# Patient Record
Sex: MALE | Race: WHITE | NOT HISPANIC OR LATINO | Employment: OTHER | RURAL
[De-identification: names, ages, dates, MRNs, and addresses within clinical notes are randomized per-mention and may not be internally consistent; named-entity substitution may affect disease eponyms.]

---

## 2020-11-19 ENCOUNTER — HISTORICAL (OUTPATIENT)
Dept: ADMINISTRATIVE | Facility: HOSPITAL | Age: 75
End: 2020-11-19

## 2020-11-19 LAB
ALBUMIN SERPL BCP-MCNC: 3.8 G/DL (ref 3.5–5)
ALBUMIN/GLOB SERPL: 1.3 {RATIO}
ALP SERPL-CCNC: 49 U/L (ref 45–115)
ALT SERPL W P-5'-P-CCNC: 21 U/L (ref 16–61)
ANION GAP SERPL CALCULATED.3IONS-SCNC: 11 MMOL/L (ref 7–16)
AST SERPL W P-5'-P-CCNC: 9 U/L (ref 15–37)
BILIRUB SERPL-MCNC: 0.4 MG/DL (ref 0–1.2)
BUN SERPL-MCNC: 15 MG/DL (ref 7–18)
BUN/CREAT SERPL: 15
CALCIUM SERPL-MCNC: 9.5 MG/DL (ref 8.5–10.1)
CHLORIDE SERPL-SCNC: 99 MMOL/L (ref 98–107)
CO2 SERPL-SCNC: 27 MMOL/L (ref 21–32)
CREAT SERPL-MCNC: 1.03 MG/DL (ref 0.7–1.3)
GLOBULIN SER-MCNC: 2.9 G/DL (ref 2–4)
GLUCOSE SERPL-MCNC: 117 MG/DL (ref 74–106)
POTASSIUM SERPL-SCNC: 4 MMOL/L (ref 3.5–5.1)
PROT SERPL-MCNC: 6.7 G/DL (ref 6.4–8.2)
PSA SERPL-MCNC: 0.99 NG/ML (ref 0–4.4)
SODIUM SERPL-SCNC: 133 MMOL/L (ref 136–145)

## 2021-01-14 ENCOUNTER — HISTORICAL (OUTPATIENT)
Dept: ADMINISTRATIVE | Facility: HOSPITAL | Age: 76
End: 2021-01-14

## 2021-01-14 LAB
ALBUMIN SERPL BCP-MCNC: 4.1 G/DL (ref 3.5–5)
ALBUMIN/GLOB SERPL: 1.3 {RATIO}
ALP SERPL-CCNC: 64 U/L (ref 45–115)
ALT SERPL W P-5'-P-CCNC: 21 U/L (ref 16–61)
ANION GAP SERPL CALCULATED.3IONS-SCNC: 10 MMOL/L (ref 7–16)
AST SERPL W P-5'-P-CCNC: 9 U/L (ref 15–37)
BASOPHILS # BLD AUTO: 0.1 X10E3/UL (ref 0–0.2)
BASOPHILS NFR BLD AUTO: 1 % (ref 0–1)
BILIRUB SERPL-MCNC: 0.5 MG/DL (ref 0–1.2)
BUN SERPL-MCNC: 18 MG/DL (ref 7–18)
BUN/CREAT SERPL: 16
CALCIUM SERPL-MCNC: 9.3 MG/DL (ref 8.5–10.1)
CHLORIDE SERPL-SCNC: 100 MMOL/L (ref 98–107)
CO2 SERPL-SCNC: 26 MMOL/L (ref 21–32)
CREAT SERPL-MCNC: 1.1 MG/DL (ref 0.7–1.3)
EOSINOPHIL # BLD AUTO: 0.2 X10E3/UL (ref 0–0.5)
EOSINOPHIL NFR BLD AUTO: 2.1 % (ref 1–4)
ERYTHROCYTE [DISTWIDTH] IN BLOOD BY AUTOMATED COUNT: 12.3 % (ref 11.5–14.5)
EST. AVERAGE GLUCOSE BLD GHB EST-MCNC: 167 MG/DL
GLOBULIN SER-MCNC: 3.2 G/DL (ref 2–4)
GLUCOSE SERPL-MCNC: 121 MG/DL (ref 74–106)
HBA1C MFR BLD HPLC: 7.6 %
HCT VFR BLD AUTO: 43.9 % (ref 40–54)
HGB BLD-MCNC: 14.8 G/DL (ref 13.5–18)
IMM GRANULOCYTES # BLD AUTO: 0.04 X10E3/UL (ref 0–0.04)
IMM GRANULOCYTES NFR BLD: 0.4 % (ref 0–0.4)
LYMPHOCYTES # BLD AUTO: 1.11 X10E3/UL (ref 1–4.8)
LYMPHOCYTES NFR BLD AUTO: 11.6 % (ref 27–41)
MCH RBC QN AUTO: 29.1 PG (ref 27–31)
MCHC RBC AUTO-ENTMCNC: 33.7 G/DL (ref 32–36)
MCV RBC AUTO: 86.2 FL (ref 80–96)
MONOCYTES # BLD AUTO: 0.69 X10E3/UL (ref 0–0.8)
MONOCYTES NFR BLD AUTO: 7.2 % (ref 2–6)
MPC BLD CALC-MCNC: 9.7 FL (ref 9.4–12.4)
NEUTROPHILS # BLD AUTO: 7.43 X10E3/UL (ref 1.8–7.7)
NEUTROPHILS NFR BLD AUTO: 77.7 % (ref 53–65)
NRBC # BLD AUTO: 0 X10E3/UL (ref 0–0)
NRBC, AUTO (.00): 0 /100 (ref 0–0)
PLATELET # BLD AUTO: 315 X10E3/UL (ref 150–400)
POTASSIUM SERPL-SCNC: 4.4 MMOL/L (ref 3.5–5.1)
PROT SERPL-MCNC: 7.3 G/DL (ref 6.4–8.2)
RBC # BLD AUTO: 5.09 X10E6/UL (ref 4.6–6.2)
SODIUM SERPL-SCNC: 132 MMOL/L (ref 136–145)
TSH SERPL DL<=0.005 MIU/L-ACNC: 1.95 UIU/ML (ref 0.36–3.74)
WBC # BLD AUTO: 9.57 X10E3/UL (ref 4.5–11)

## 2021-06-24 ENCOUNTER — LAB REQUISITION (OUTPATIENT)
Dept: LAB | Facility: HOSPITAL | Age: 76
End: 2021-06-24
Attending: FAMILY MEDICINE
Payer: COMMERCIAL

## 2021-06-24 DIAGNOSIS — D48.9 NEOPLASM OF UNCERTAIN BEHAVIOR, UNSPECIFIED: ICD-10-CM

## 2021-06-24 PROCEDURE — 88304 TISSUE EXAM BY PATHOLOGIST: CPT | Mod: SUR | Performed by: FAMILY MEDICINE

## 2021-06-24 PROCEDURE — 88304 TISSUE EXAM BY PATHOLOGIST: CPT | Mod: 26,,, | Performed by: PATHOLOGY

## 2021-06-24 PROCEDURE — 88304 SURGICAL PATHOLOGY: ICD-10-PCS | Mod: 26,,, | Performed by: PATHOLOGY

## 2021-06-25 LAB
ESTROGEN SERPL-MCNC: NORMAL PG/ML
LAB AP GROSS DESCRIPTION: NORMAL
LAB AP LABORATORY NOTES: NORMAL
T3RU NFR SERPL: NORMAL %

## 2021-09-30 DIAGNOSIS — Z12.11 SCREENING FOR MALIGNANT NEOPLASM OF COLON: Primary | ICD-10-CM

## 2021-10-04 DIAGNOSIS — Z12.11 SCREENING FOR MALIGNANT NEOPLASM OF COLON: Primary | ICD-10-CM

## 2021-10-07 DIAGNOSIS — Z11.59 SCREENING FOR VIRAL DISEASE: Primary | ICD-10-CM

## 2021-10-12 ENCOUNTER — LAB VISIT (OUTPATIENT)
Dept: LAB | Facility: HOSPITAL | Age: 76
End: 2021-10-12
Attending: PHYSICIAN ASSISTANT
Payer: COMMERCIAL

## 2021-10-12 DIAGNOSIS — Z11.59 SCREENING FOR VIRAL DISEASE: ICD-10-CM

## 2021-10-12 LAB — SARS-COV-2 RNA RESP QL NAA+PROBE: NEGATIVE

## 2021-10-12 PROCEDURE — 87635 SARS-COV-2 COVID-19 AMP PRB: CPT

## 2021-10-14 ENCOUNTER — HOSPITAL ENCOUNTER (OUTPATIENT)
Dept: GASTROENTEROLOGY | Facility: HOSPITAL | Age: 76
Discharge: HOME | End: 2021-10-14
Attending: STUDENT IN AN ORGANIZED HEALTH CARE EDUCATION/TRAINING PROGRAM
Payer: COMMERCIAL

## 2021-10-14 ENCOUNTER — ANESTHESIA EVENT (OUTPATIENT)
Dept: GASTROENTEROLOGY | Facility: HOSPITAL | Age: 76
End: 2021-10-14
Payer: COMMERCIAL

## 2021-10-14 ENCOUNTER — ANESTHESIA (OUTPATIENT)
Dept: GASTROENTEROLOGY | Facility: HOSPITAL | Age: 76
End: 2021-10-14
Payer: COMMERCIAL

## 2021-10-14 VITALS
WEIGHT: 222 LBS | SYSTOLIC BLOOD PRESSURE: 94 MMHG | HEIGHT: 73 IN | DIASTOLIC BLOOD PRESSURE: 58 MMHG | OXYGEN SATURATION: 98 % | TEMPERATURE: 97 F | RESPIRATION RATE: 13 BRPM | BODY MASS INDEX: 29.42 KG/M2 | HEART RATE: 66 BPM

## 2021-10-14 DIAGNOSIS — Z12.11 SCREENING FOR MALIGNANT NEOPLASM OF COLON: ICD-10-CM

## 2021-10-14 LAB — GLUCOSE SERPL-MCNC: 197 MG/DL (ref 70–105)

## 2021-10-14 PROCEDURE — 63600175 PHARM REV CODE 636 W HCPCS: Performed by: NURSE ANESTHETIST, CERTIFIED REGISTERED

## 2021-10-14 PROCEDURE — 45385 COLONOSCOPY W/LESION REMOVAL: CPT | Mod: 33,,, | Performed by: STUDENT IN AN ORGANIZED HEALTH CARE EDUCATION/TRAINING PROGRAM

## 2021-10-14 PROCEDURE — 45385 COLONOSCOPY W/LESION REMOVAL: CPT | Mod: 33

## 2021-10-14 PROCEDURE — D9220A PRA ANESTHESIA: Mod: PT,,, | Performed by: NURSE ANESTHETIST, CERTIFIED REGISTERED

## 2021-10-14 PROCEDURE — C1889 IMPLANT/INSERT DEVICE, NOC: HCPCS

## 2021-10-14 PROCEDURE — 25000003 PHARM REV CODE 250: Performed by: STUDENT IN AN ORGANIZED HEALTH CARE EDUCATION/TRAINING PROGRAM

## 2021-10-14 PROCEDURE — 45380 COLONOSCOPY AND BIOPSY: CPT

## 2021-10-14 PROCEDURE — 37000009 HC ANESTHESIA EA ADD 15 MINS

## 2021-10-14 PROCEDURE — 25000003 PHARM REV CODE 250: Performed by: NURSE ANESTHETIST, CERTIFIED REGISTERED

## 2021-10-14 PROCEDURE — 88305 TISSUE EXAM BY PATHOLOGIST: CPT | Mod: 26,,, | Performed by: PATHOLOGY

## 2021-10-14 PROCEDURE — 82962 GLUCOSE BLOOD TEST: CPT

## 2021-10-14 PROCEDURE — D9220A PRA ANESTHESIA: ICD-10-PCS | Mod: PT,,, | Performed by: NURSE ANESTHETIST, CERTIFIED REGISTERED

## 2021-10-14 PROCEDURE — 45385 PR COLONOSCOPY,REMV LESN,SNARE: ICD-10-PCS | Mod: 33,,, | Performed by: STUDENT IN AN ORGANIZED HEALTH CARE EDUCATION/TRAINING PROGRAM

## 2021-10-14 PROCEDURE — 45380 COLONOSCOPY AND BIOPSY: CPT | Mod: 59,33,, | Performed by: STUDENT IN AN ORGANIZED HEALTH CARE EDUCATION/TRAINING PROGRAM

## 2021-10-14 PROCEDURE — 27201423 OPTIME MED/SURG SUP & DEVICES STERILE SUPPLY

## 2021-10-14 PROCEDURE — 27000284 HC CANNULA NASAL: Performed by: NURSE ANESTHETIST, CERTIFIED REGISTERED

## 2021-10-14 PROCEDURE — 88305 TISSUE EXAM BY PATHOLOGIST: CPT | Mod: SUR | Performed by: STUDENT IN AN ORGANIZED HEALTH CARE EDUCATION/TRAINING PROGRAM

## 2021-10-14 PROCEDURE — 37000008 HC ANESTHESIA 1ST 15 MINUTES

## 2021-10-14 PROCEDURE — 45380 PR COLONOSCOPY,BIOPSY: ICD-10-PCS | Mod: 59,33,, | Performed by: STUDENT IN AN ORGANIZED HEALTH CARE EDUCATION/TRAINING PROGRAM

## 2021-10-14 PROCEDURE — 88305 SURGICAL PATHOLOGY: ICD-10-PCS | Mod: 26,,, | Performed by: PATHOLOGY

## 2021-10-14 RX ORDER — LIDOCAINE HYDROCHLORIDE 20 MG/ML
INJECTION, SOLUTION EPIDURAL; INFILTRATION; INTRACAUDAL; PERINEURAL
Status: DISCONTINUED | OUTPATIENT
Start: 2021-10-14 | End: 2021-10-14

## 2021-10-14 RX ORDER — METFORMIN HYDROCHLORIDE 1000 MG/1
1000 TABLET ORAL 2 TIMES DAILY WITH MEALS
COMMUNITY
End: 2022-10-11 | Stop reason: SDUPTHER

## 2021-10-14 RX ORDER — DOXYCYCLINE 100 MG/1
100 CAPSULE ORAL EVERY 12 HOURS
COMMUNITY

## 2021-10-14 RX ORDER — PRAVASTATIN SODIUM 40 MG/1
40 TABLET ORAL DAILY
COMMUNITY

## 2021-10-14 RX ORDER — PROPOFOL 10 MG/ML
INJECTION, EMULSION INTRAVENOUS
Status: DISCONTINUED | OUTPATIENT
Start: 2021-10-14 | End: 2021-10-14

## 2021-10-14 RX ORDER — DORZOLAMIDE HCL 20 MG/ML
1 SOLUTION/ DROPS OPHTHALMIC 3 TIMES DAILY
COMMUNITY

## 2021-10-14 RX ORDER — LOSARTAN POTASSIUM AND HYDROCHLOROTHIAZIDE 25; 100 MG/1; MG/1
1 TABLET ORAL DAILY
COMMUNITY
End: 2022-10-11 | Stop reason: SDUPTHER

## 2021-10-14 RX ORDER — CARVEDILOL 3.12 MG/1
3.12 TABLET ORAL 2 TIMES DAILY WITH MEALS
COMMUNITY

## 2021-10-14 RX ORDER — BIMATOPROST 0.3 MG/ML
1 SOLUTION/ DROPS OPHTHALMIC NIGHTLY
COMMUNITY

## 2021-10-14 RX ORDER — PHENYLEPHRINE HYDROCHLORIDE 10 MG/ML
INJECTION INTRAVENOUS
Status: DISCONTINUED | OUTPATIENT
Start: 2021-10-14 | End: 2021-10-14

## 2021-10-14 RX ORDER — SODIUM CHLORIDE 0.9 % (FLUSH) 0.9 %
10 SYRINGE (ML) INJECTION
Status: DISCONTINUED | OUTPATIENT
Start: 2021-10-14 | End: 2021-10-15 | Stop reason: HOSPADM

## 2021-10-14 RX ORDER — SODIUM CHLORIDE 9 MG/ML
INJECTION, SOLUTION INTRAVENOUS CONTINUOUS
Status: DISCONTINUED | OUTPATIENT
Start: 2021-10-14 | End: 2021-10-15 | Stop reason: HOSPADM

## 2021-10-14 RX ORDER — NAPROXEN SODIUM 220 MG/1
81 TABLET, FILM COATED ORAL
COMMUNITY

## 2021-10-14 RX ORDER — AMLODIPINE BESYLATE 10 MG/1
10 TABLET ORAL DAILY
COMMUNITY
End: 2022-10-11 | Stop reason: SDUPTHER

## 2021-10-14 RX ORDER — LIRAGLUTIDE 6 MG/ML
1.8 INJECTION SUBCUTANEOUS DAILY
COMMUNITY
End: 2022-10-11 | Stop reason: SDUPTHER

## 2021-10-14 RX ORDER — INSULIN DETEMIR 100 [IU]/ML
50 INJECTION, SOLUTION SUBCUTANEOUS NIGHTLY
COMMUNITY
End: 2022-10-11 | Stop reason: SDUPTHER

## 2021-10-14 RX ADMIN — PROPOFOL 50 MG: 10 INJECTION, EMULSION INTRAVENOUS at 08:10

## 2021-10-14 RX ADMIN — PROPOFOL 100 MG: 10 INJECTION, EMULSION INTRAVENOUS at 08:10

## 2021-10-14 RX ADMIN — PHENYLEPHRINE HYDROCHLORIDE 100 MCG: 10 INJECTION INTRAVENOUS at 08:10

## 2021-10-14 RX ADMIN — SODIUM CHLORIDE: 9 INJECTION, SOLUTION INTRAVENOUS at 07:10

## 2021-10-14 RX ADMIN — LIDOCAINE HYDROCHLORIDE 100 MG: 20 INJECTION, SOLUTION INTRAVENOUS at 08:10

## 2022-10-11 ENCOUNTER — OFFICE VISIT (OUTPATIENT)
Dept: FAMILY MEDICINE | Facility: CLINIC | Age: 77
End: 2022-10-11
Payer: COMMERCIAL

## 2022-10-11 VITALS
WEIGHT: 228.5 LBS | HEART RATE: 76 BPM | OXYGEN SATURATION: 98 % | TEMPERATURE: 98 F | SYSTOLIC BLOOD PRESSURE: 132 MMHG | BODY MASS INDEX: 30.28 KG/M2 | HEIGHT: 73 IN | DIASTOLIC BLOOD PRESSURE: 76 MMHG

## 2022-10-11 DIAGNOSIS — Z79.4 TYPE 2 DIABETES MELLITUS WITHOUT COMPLICATION, WITH LONG-TERM CURRENT USE OF INSULIN: Primary | ICD-10-CM

## 2022-10-11 DIAGNOSIS — E11.9 TYPE 2 DIABETES MELLITUS WITHOUT COMPLICATION, WITH LONG-TERM CURRENT USE OF INSULIN: Primary | ICD-10-CM

## 2022-10-11 DIAGNOSIS — I10 HYPERTENSION, UNSPECIFIED TYPE: ICD-10-CM

## 2022-10-11 DIAGNOSIS — R73.9 HEMOGLOBIN A1C BETWEEN 7.0% AND 9.0%: ICD-10-CM

## 2022-10-11 DIAGNOSIS — E78.5 HYPERLIPIDEMIA, UNSPECIFIED HYPERLIPIDEMIA TYPE: ICD-10-CM

## 2022-10-11 DIAGNOSIS — Z12.5 SCREENING FOR MALIGNANT NEOPLASM OF PROSTATE: ICD-10-CM

## 2022-10-11 LAB
ALBUMIN SERPL BCP-MCNC: 4.3 G/DL (ref 3.5–5)
ALBUMIN/GLOB SERPL: 1.4 {RATIO}
ALP SERPL-CCNC: 63 U/L (ref 45–115)
ALT SERPL W P-5'-P-CCNC: 22 U/L (ref 16–61)
ANION GAP SERPL CALCULATED.3IONS-SCNC: 12 MMOL/L (ref 7–16)
AST SERPL W P-5'-P-CCNC: 7 U/L (ref 15–37)
BASOPHILS # BLD AUTO: 0.09 K/UL (ref 0–0.2)
BASOPHILS NFR BLD AUTO: 0.9 % (ref 0–1)
BILIRUB SERPL-MCNC: 0.6 MG/DL (ref ?–1.2)
BUN SERPL-MCNC: 12 MG/DL (ref 7–18)
BUN/CREAT SERPL: 11 (ref 6–20)
CALCIUM SERPL-MCNC: 9.6 MG/DL (ref 8.5–10.1)
CHLORIDE SERPL-SCNC: 97 MMOL/L (ref 98–107)
CO2 SERPL-SCNC: 26 MMOL/L (ref 21–32)
CREAT SERPL-MCNC: 1.13 MG/DL (ref 0.7–1.3)
DIFFERENTIAL METHOD BLD: ABNORMAL
EGFR (NO RACE VARIABLE) (RUSH/TITUS): 67 ML/MIN/1.73M²
EOSINOPHIL # BLD AUTO: 0.2 K/UL (ref 0–0.5)
EOSINOPHIL NFR BLD AUTO: 2 % (ref 1–4)
ERYTHROCYTE [DISTWIDTH] IN BLOOD BY AUTOMATED COUNT: 12.3 % (ref 11.5–14.5)
GLOBULIN SER-MCNC: 3.1 G/DL (ref 2–4)
GLUCOSE SERPL-MCNC: 151 MG/DL (ref 74–106)
HCT VFR BLD AUTO: 47 % (ref 40–54)
HGB BLD-MCNC: 16.1 G/DL (ref 13.5–18)
IMM GRANULOCYTES # BLD AUTO: 0.05 K/UL (ref 0–0.04)
IMM GRANULOCYTES NFR BLD: 0.5 % (ref 0–0.4)
LYMPHOCYTES # BLD AUTO: 1.69 K/UL (ref 1–4.8)
LYMPHOCYTES NFR BLD AUTO: 16.7 % (ref 27–41)
MCH RBC QN AUTO: 29.1 PG (ref 27–31)
MCHC RBC AUTO-ENTMCNC: 34.3 G/DL (ref 32–36)
MCV RBC AUTO: 85 FL (ref 80–96)
MONOCYTES # BLD AUTO: 0.7 K/UL (ref 0–0.8)
MONOCYTES NFR BLD AUTO: 6.9 % (ref 2–6)
MPC BLD CALC-MCNC: 9.5 FL (ref 9.4–12.4)
NEUTROPHILS # BLD AUTO: 7.38 K/UL (ref 1.8–7.7)
NEUTROPHILS NFR BLD AUTO: 73 % (ref 53–65)
NRBC # BLD AUTO: 0 X10E3/UL
NRBC, AUTO (.00): 0 %
PLATELET # BLD AUTO: 358 K/UL (ref 150–400)
POTASSIUM SERPL-SCNC: 5.3 MMOL/L (ref 3.5–5.1)
PROT SERPL-MCNC: 7.4 G/DL (ref 6.4–8.2)
PSA SERPL-MCNC: 1.3 NG/ML
RBC # BLD AUTO: 5.53 M/UL (ref 4.6–6.2)
SODIUM SERPL-SCNC: 130 MMOL/L (ref 136–145)
WBC # BLD AUTO: 10.11 K/UL (ref 4.5–11)

## 2022-10-11 PROCEDURE — 82570 ASSAY OF URINE CREATININE: CPT | Mod: ,,, | Performed by: CLINICAL MEDICAL LABORATORY

## 2022-10-11 PROCEDURE — 99204 PR OFFICE/OUTPT VISIT, NEW, LEVL IV, 45-59 MIN: ICD-10-PCS | Mod: ,,, | Performed by: FAMILY MEDICINE

## 2022-10-11 PROCEDURE — 3075F PR MOST RECENT SYSTOLIC BLOOD PRESS GE 130-139MM HG: ICD-10-PCS | Mod: CPTII,,, | Performed by: FAMILY MEDICINE

## 2022-10-11 PROCEDURE — 3075F SYST BP GE 130 - 139MM HG: CPT | Mod: CPTII,,, | Performed by: FAMILY MEDICINE

## 2022-10-11 PROCEDURE — 3078F PR MOST RECENT DIASTOLIC BLOOD PRESSURE < 80 MM HG: ICD-10-PCS | Mod: CPTII,,, | Performed by: FAMILY MEDICINE

## 2022-10-11 PROCEDURE — 83036 HEMOGLOBIN A1C: ICD-10-PCS | Mod: ,,, | Performed by: CLINICAL MEDICAL LABORATORY

## 2022-10-11 PROCEDURE — G0103 PSA, SCREENING: ICD-10-PCS | Mod: ,,, | Performed by: CLINICAL MEDICAL LABORATORY

## 2022-10-11 PROCEDURE — 82570 MICROALBUMIN / CREATININE RATIO URINE: ICD-10-PCS | Mod: ,,, | Performed by: CLINICAL MEDICAL LABORATORY

## 2022-10-11 PROCEDURE — 3288F PR FALLS RISK ASSESSMENT DOCUMENTED: ICD-10-PCS | Mod: CPTII,,, | Performed by: FAMILY MEDICINE

## 2022-10-11 PROCEDURE — 1159F MED LIST DOCD IN RCRD: CPT | Mod: CPTII,,, | Performed by: FAMILY MEDICINE

## 2022-10-11 PROCEDURE — 80053 COMPREHEN METABOLIC PANEL: CPT | Mod: ,,, | Performed by: CLINICAL MEDICAL LABORATORY

## 2022-10-11 PROCEDURE — 99204 OFFICE O/P NEW MOD 45 MIN: CPT | Mod: ,,, | Performed by: FAMILY MEDICINE

## 2022-10-11 PROCEDURE — 82043 MICROALBUMIN / CREATININE RATIO URINE: ICD-10-PCS | Mod: ,,, | Performed by: CLINICAL MEDICAL LABORATORY

## 2022-10-11 PROCEDURE — 1101F PT FALLS ASSESS-DOCD LE1/YR: CPT | Mod: CPTII,,, | Performed by: FAMILY MEDICINE

## 2022-10-11 PROCEDURE — G0103 PSA SCREENING: HCPCS | Mod: ,,, | Performed by: CLINICAL MEDICAL LABORATORY

## 2022-10-11 PROCEDURE — 3078F DIAST BP <80 MM HG: CPT | Mod: CPTII,,, | Performed by: FAMILY MEDICINE

## 2022-10-11 PROCEDURE — 85025 CBC WITH DIFFERENTIAL: ICD-10-PCS | Mod: ,,, | Performed by: CLINICAL MEDICAL LABORATORY

## 2022-10-11 PROCEDURE — 1159F PR MEDICATION LIST DOCUMENTED IN MEDICAL RECORD: ICD-10-PCS | Mod: CPTII,,, | Performed by: FAMILY MEDICINE

## 2022-10-11 PROCEDURE — 1101F PR PT FALLS ASSESS DOC 0-1 FALLS W/OUT INJ PAST YR: ICD-10-PCS | Mod: CPTII,,, | Performed by: FAMILY MEDICINE

## 2022-10-11 PROCEDURE — 85025 COMPLETE CBC W/AUTO DIFF WBC: CPT | Mod: ,,, | Performed by: CLINICAL MEDICAL LABORATORY

## 2022-10-11 PROCEDURE — 3288F FALL RISK ASSESSMENT DOCD: CPT | Mod: CPTII,,, | Performed by: FAMILY MEDICINE

## 2022-10-11 PROCEDURE — 80053 COMPREHENSIVE METABOLIC PANEL: ICD-10-PCS | Mod: ,,, | Performed by: CLINICAL MEDICAL LABORATORY

## 2022-10-11 PROCEDURE — 82043 UR ALBUMIN QUANTITATIVE: CPT | Mod: ,,, | Performed by: CLINICAL MEDICAL LABORATORY

## 2022-10-11 PROCEDURE — 83036 HEMOGLOBIN GLYCOSYLATED A1C: CPT | Mod: ,,, | Performed by: CLINICAL MEDICAL LABORATORY

## 2022-10-11 RX ORDER — INSULIN DETEMIR 100 [IU]/ML
50 INJECTION, SOLUTION SUBCUTANEOUS NIGHTLY
Qty: 45 ML | Refills: 0 | Status: SHIPPED | OUTPATIENT
Start: 2022-10-11 | End: 2023-01-12 | Stop reason: SDUPTHER

## 2022-10-11 RX ORDER — METFORMIN HYDROCHLORIDE 1000 MG/1
1000 TABLET ORAL 2 TIMES DAILY WITH MEALS
Qty: 180 TABLET | Refills: 0 | Status: SHIPPED | OUTPATIENT
Start: 2022-10-11 | End: 2023-01-12 | Stop reason: SDUPTHER

## 2022-10-11 RX ORDER — PEN NEEDLE, DIABETIC 29 G X1/2"
2 NEEDLE, DISPOSABLE MISCELLANEOUS 2 TIMES DAILY
Qty: 100 EACH | Refills: 3 | Status: SHIPPED | OUTPATIENT
Start: 2022-10-11 | End: 2023-04-17 | Stop reason: SDUPTHER

## 2022-10-11 RX ORDER — LIRAGLUTIDE 6 MG/ML
1.8 INJECTION SUBCUTANEOUS DAILY
Qty: 27 ML | Refills: 0 | Status: SHIPPED | OUTPATIENT
Start: 2022-10-11 | End: 2023-01-12 | Stop reason: SDUPTHER

## 2022-10-11 RX ORDER — PEN NEEDLE, DIABETIC 29 G X1/2"
2 NEEDLE, DISPOSABLE MISCELLANEOUS 2 TIMES DAILY
COMMUNITY
End: 2022-10-11 | Stop reason: SDUPTHER

## 2022-10-11 RX ORDER — AMLODIPINE BESYLATE 10 MG/1
10 TABLET ORAL DAILY
Qty: 90 TABLET | Refills: 1 | Status: SHIPPED | OUTPATIENT
Start: 2022-10-11 | End: 2023-01-12 | Stop reason: SDUPTHER

## 2022-10-11 RX ORDER — LOSARTAN POTASSIUM AND HYDROCHLOROTHIAZIDE 25; 100 MG/1; MG/1
1 TABLET ORAL DAILY
Qty: 90 TABLET | Refills: 1 | Status: SHIPPED | OUTPATIENT
Start: 2022-10-11 | End: 2023-01-12 | Stop reason: SDUPTHER

## 2022-10-11 NOTE — PROGRESS NOTES
Dontrell Shetty MD   [facility]  [address] [place] [address]     PATIENT NAME: Milton Johnson  : 1945  DATE: 10/11/22  MRN: [id number]      Billing Provider: Dontrell Shetty MD  Level of Service:   Patient PCP Information       Provider PCP Higinio Jarrett MD General            Reason for Visit / Chief Complaint: Diabetes (Establish care. Fasting labs. Med refills.), Hypertension, and Hyperlipidemia         History of Present Illness / Problem Focused Workflow     Milton Johnson presents to the clinic with Diabetes (Establish care. Fasting labs. Med refills.), Hypertension, and Hyperlipidemia     HPI    Review of Systems     Review of Systems   Constitutional:  Negative for activity change, appetite change, fatigue and fever.   HENT:  Negative for nasal congestion, ear pain, hearing loss, sinus pressure/congestion and sore throat.    Respiratory:  Negative for cough, chest tightness and shortness of breath.    Cardiovascular:  Negative for chest pain and palpitations.   Gastrointestinal:  Negative for abdominal pain and fecal incontinence.   Genitourinary:  Negative for bladder incontinence, difficulty urinating and erectile dysfunction.   Musculoskeletal:  Negative for arthralgias.   Integumentary:  Negative for rash.   Neurological:  Negative for dizziness and headaches.      Medical / Social / Family History     Past Medical History:   Diagnosis Date    Diabetes mellitus     Glaucoma     High cholesterol     Hypertension        Past Surgical History:   Procedure Laterality Date    CATARACT EXTRACTION      EYE SURGERY         Social History  Milton Johnson  reports that he has never smoked. He has never used smokeless tobacco. He reports that he does not drink alcohol.    Family History  Milton Johnson  family history is not on file.    Medications and Allergies     Medications  Outpatient Medications Marked as Taking for the 10/11/22 encounter (Office  "Visit) with Dontrell Shetty MD   Medication Sig Dispense Refill    amLODIPine (NORVASC) 10 MG tablet Take 10 mg by mouth once daily.      aspirin 81 MG Chew Take 81 mg by mouth. 2 times per week      bimatoprost (LUMIGAN) 0.03 % ophthalmic drops 1 drop every evening.      carvediloL (COREG) 3.125 MG tablet Take 3.125 mg by mouth 2 (two) times daily with meals.      dorzolamide (TRUSOPT) 2 % ophthalmic solution 1 drop 3 (three) times daily.      insulin detemir U-100 (LEVEMIR FLEXTOUCH U-100 INSULN) 100 unit/mL (3 mL) InPn pen Inject 50 Units into the skin every evening.      liraglutide 0.6 mg/0.1 mL, 18 mg/3 mL, subq PNIJ (VICTOZA 3-LORI) 0.6 mg/0.1 mL (18 mg/3 mL) PnIj pen Inject 1.8 mg into the skin once daily.      losartan-hydrochlorothiazide 100-25 mg (HYZAAR) 100-25 mg per tablet Take 1 tablet by mouth once daily.      metFORMIN (GLUCOPHAGE) 1000 MG tablet Take 1,000 mg by mouth 2 (two) times daily with meals.      pen needle, diabetic 31 gauge x 1/4" Ndle Inject 2 each into the skin 2 (two) times a day.      pravastatin (PRAVACHOL) 40 MG tablet Take 40 mg by mouth once daily.         Allergies  Review of patient's allergies indicates:  No Known Allergies    Physical Examination     Vitals:    10/11/22 0918   BP: 132/76   Pulse: 76   Temp: 97.8 °F (36.6 °C)     Physical Exam  Constitutional:       General: He is not in acute distress.     Appearance: He is not ill-appearing.   HENT:      Head: Normocephalic and atraumatic.      Right Ear: Tympanic membrane and ear canal normal.      Left Ear: Tympanic membrane and ear canal normal.      Nose: Nose normal. No congestion or rhinorrhea.   Eyes:      Pupils: Pupils are equal, round, and reactive to light.   Cardiovascular:      Rate and Rhythm: Normal rate and regular rhythm.      Pulses: Normal pulses.      Heart sounds: No murmur heard.  Pulmonary:      Effort: No respiratory distress.      Breath sounds: No wheezing, rhonchi or rales.   Abdominal:      " General: Bowel sounds are normal.      Palpations: Abdomen is soft.      Tenderness: There is no abdominal tenderness.      Hernia: No hernia is present.   Musculoskeletal:      Cervical back: Normal range of motion and neck supple.   Lymphadenopathy:      Cervical: No cervical adenopathy.   Skin:     General: Skin is warm and dry.   Neurological:      Mental Status: He is alert.   Psychiatric:         Behavior: Behavior normal.         Thought Content: Thought content normal.        Assessment and Plan (including Health Maintenance)   :    Plan:         Health Maintenance Due   Topic Date Due    Hepatitis C Screening  Never done    Diabetes Urine Screening  Never done    Pneumococcal Vaccines (Age 65+) (1 - PCV) Never done    TETANUS VACCINE  Never done    Shingles Vaccine (1 of 2) Never done    COVID-19 Vaccine (4 - Booster for Moderna series) 12/23/2021    Hemoglobin A1c  10/07/2022       Problem List Items Addressed This Visit    None  Visit Diagnoses       Type 2 diabetes mellitus without complication, with long-term current use of insulin    -  Primary    Relevant Orders    CBC Auto Differential    Comprehensive Metabolic Panel    Hemoglobin A1C    Microalbumin/Creatinine Ratio, Urine    Hypertension, unspecified type        Relevant Orders    CBC Auto Differential    Comprehensive Metabolic Panel    Hyperlipidemia, unspecified hyperlipidemia type        Relevant Orders    CBC Auto Differential    Comprehensive Metabolic Panel    Hemoglobin A1c between 7.0% and 9.0%        Relevant Orders    Hemoglobin A1C    Microalbumin/Creatinine Ratio, Urine    Screening for malignant neoplasm of prostate        Relevant Orders    PSA, Screening          Type 2 diabetes mellitus without complication, with long-term current use of insulin  -     CBC Auto Differential; Future; Expected date: 10/11/2022  -     Comprehensive Metabolic Panel; Future; Expected date: 10/11/2022  -     Hemoglobin A1C; Future; Expected date:  10/11/2022  -     Microalbumin/Creatinine Ratio, Urine; Future; Expected date: 10/11/2022    Hypertension, unspecified type  -     CBC Auto Differential; Future; Expected date: 10/11/2022  -     Comprehensive Metabolic Panel; Future; Expected date: 10/11/2022    Hyperlipidemia, unspecified hyperlipidemia type  -     CBC Auto Differential; Future; Expected date: 10/11/2022  -     Comprehensive Metabolic Panel; Future; Expected date: 10/11/2022    Hemoglobin A1c between 7.0% and 9.0%  -     Hemoglobin A1C; Future; Expected date: 10/11/2022  -     Microalbumin/Creatinine Ratio, Urine; Future; Expected date: 10/11/2022    Screening for malignant neoplasm of prostate  -     PSA, Screening; Future; Expected date: 10/11/2022       Health Maintenance Topics with due status: Not Due       Topic Last Completion Date    Lipid Panel 07/07/2022    Eye Exam 09/21/2022       Procedures     Future Appointments   Date Time Provider Department Center   10/25/2022  9:30 AM SANDRA Stubbs [facility] ZAYNAB Durant        No follow-ups on file.       Signature:  Dontrell Shetty MD  [facility]  [address] [facility]   [place] [address]  [phone] Phone  [phone] Fax    Date of encounter: 10/11/22

## 2022-10-12 LAB
CREAT UR-MCNC: 111 MG/DL (ref 39–259)
EST. AVERAGE GLUCOSE BLD GHB EST-MCNC: 210 MG/DL
HBA1C MFR BLD HPLC: 8.9 % (ref 4.5–6.6)
MICROALBUMIN UR-MCNC: 9.1 MG/DL (ref 0–2.8)
MICROALBUMIN/CREAT RATIO PNL UR: 82 MG/G (ref 0–30)

## 2023-01-12 ENCOUNTER — OFFICE VISIT (OUTPATIENT)
Dept: FAMILY MEDICINE | Facility: CLINIC | Age: 78
End: 2023-01-12
Payer: MEDICARE

## 2023-01-12 VITALS
HEIGHT: 73 IN | HEART RATE: 75 BPM | OXYGEN SATURATION: 97 % | BODY MASS INDEX: 29.37 KG/M2 | SYSTOLIC BLOOD PRESSURE: 140 MMHG | TEMPERATURE: 98 F | DIASTOLIC BLOOD PRESSURE: 72 MMHG | WEIGHT: 221.63 LBS

## 2023-01-12 DIAGNOSIS — E78.5 HYPERLIPIDEMIA, UNSPECIFIED HYPERLIPIDEMIA TYPE: ICD-10-CM

## 2023-01-12 DIAGNOSIS — Z79.4 TYPE 2 DIABETES MELLITUS WITHOUT COMPLICATION, WITH LONG-TERM CURRENT USE OF INSULIN: Primary | ICD-10-CM

## 2023-01-12 DIAGNOSIS — I10 HYPERTENSION, UNSPECIFIED TYPE: ICD-10-CM

## 2023-01-12 DIAGNOSIS — E11.9 TYPE 2 DIABETES MELLITUS WITHOUT COMPLICATION, WITH LONG-TERM CURRENT USE OF INSULIN: Primary | ICD-10-CM

## 2023-01-12 DIAGNOSIS — H61.23 BILATERAL IMPACTED CERUMEN: ICD-10-CM

## 2023-01-12 DIAGNOSIS — R73.9 HEMOGLOBIN A1C BETWEEN 7.0% AND 9.0%: ICD-10-CM

## 2023-01-12 LAB
ANION GAP SERPL CALCULATED.3IONS-SCNC: 15 MMOL/L (ref 7–16)
BASOPHILS # BLD AUTO: 0.12 K/UL (ref 0–0.2)
BASOPHILS NFR BLD AUTO: 1.2 % (ref 0–1)
BUN SERPL-MCNC: 17 MG/DL (ref 7–18)
BUN/CREAT SERPL: 16 (ref 6–20)
CALCIUM SERPL-MCNC: 9.5 MG/DL (ref 8.5–10.1)
CHLORIDE SERPL-SCNC: 94 MMOL/L (ref 98–107)
CHOLEST SERPL-MCNC: 131 MG/DL (ref 0–200)
CHOLEST/HDLC SERPL: 2.8 {RATIO}
CO2 SERPL-SCNC: 26 MMOL/L (ref 21–32)
CREAT SERPL-MCNC: 1.05 MG/DL (ref 0.7–1.3)
DIFFERENTIAL METHOD BLD: ABNORMAL
EGFR (NO RACE VARIABLE) (RUSH/TITUS): 74 ML/MIN/1.73M²
EOSINOPHIL # BLD AUTO: 0.15 K/UL (ref 0–0.5)
EOSINOPHIL NFR BLD AUTO: 1.6 % (ref 1–4)
ERYTHROCYTE [DISTWIDTH] IN BLOOD BY AUTOMATED COUNT: 12.3 % (ref 11.5–14.5)
EST. AVERAGE GLUCOSE BLD GHB EST-MCNC: 224 MG/DL
GLUCOSE SERPL-MCNC: 131 MG/DL (ref 74–106)
HBA1C MFR BLD HPLC: 9.3 % (ref 4.5–6.6)
HCT VFR BLD AUTO: 44.8 % (ref 40–54)
HDLC SERPL-MCNC: 46 MG/DL (ref 40–60)
HGB BLD-MCNC: 15.2 G/DL (ref 13.5–18)
IMM GRANULOCYTES # BLD AUTO: 0.05 K/UL (ref 0–0.04)
IMM GRANULOCYTES NFR BLD: 0.5 % (ref 0–0.4)
LDLC SERPL CALC-MCNC: 65 MG/DL
LDLC/HDLC SERPL: 1.4 {RATIO}
LYMPHOCYTES # BLD AUTO: 1.63 K/UL (ref 1–4.8)
LYMPHOCYTES NFR BLD AUTO: 16.9 % (ref 27–41)
MCH RBC QN AUTO: 28.6 PG (ref 27–31)
MCHC RBC AUTO-ENTMCNC: 33.9 G/DL (ref 32–36)
MCV RBC AUTO: 84.2 FL (ref 80–96)
MONOCYTES # BLD AUTO: 0.67 K/UL (ref 0–0.8)
MONOCYTES NFR BLD AUTO: 6.9 % (ref 2–6)
MPC BLD CALC-MCNC: 8.7 FL (ref 9.4–12.4)
NEUTROPHILS # BLD AUTO: 7.03 K/UL (ref 1.8–7.7)
NEUTROPHILS NFR BLD AUTO: 72.9 % (ref 53–65)
NONHDLC SERPL-MCNC: 85 MG/DL
NRBC # BLD AUTO: 0 X10E3/UL
NRBC, AUTO (.00): 0 %
PLATELET # BLD AUTO: 393 K/UL (ref 150–400)
POTASSIUM SERPL-SCNC: 4.5 MMOL/L (ref 3.5–5.1)
RBC # BLD AUTO: 5.32 M/UL (ref 4.6–6.2)
SODIUM SERPL-SCNC: 130 MMOL/L (ref 136–145)
TRIGL SERPL-MCNC: 98 MG/DL (ref 35–150)
VLDLC SERPL-MCNC: 20 MG/DL
WBC # BLD AUTO: 9.65 K/UL (ref 4.5–11)

## 2023-01-12 PROCEDURE — 85025 CBC WITH DIFFERENTIAL: ICD-10-PCS | Mod: ,,, | Performed by: CLINICAL MEDICAL LABORATORY

## 2023-01-12 PROCEDURE — 1100F PR PT FALLS ASSESS DOC 2+ FALLS/FALL W/INJURY/YR: ICD-10-PCS | Mod: ,,, | Performed by: FAMILY MEDICINE

## 2023-01-12 PROCEDURE — 1159F MED LIST DOCD IN RCRD: CPT | Mod: ,,, | Performed by: FAMILY MEDICINE

## 2023-01-12 PROCEDURE — 99214 PR OFFICE/OUTPT VISIT, EST, LEVL IV, 30-39 MIN: ICD-10-PCS | Mod: 25,,, | Performed by: FAMILY MEDICINE

## 2023-01-12 PROCEDURE — 3078F DIAST BP <80 MM HG: CPT | Mod: ,,, | Performed by: FAMILY MEDICINE

## 2023-01-12 PROCEDURE — 83036 HEMOGLOBIN A1C: ICD-10-PCS | Mod: ,,, | Performed by: CLINICAL MEDICAL LABORATORY

## 2023-01-12 PROCEDURE — 80048 BASIC METABOLIC PANEL: ICD-10-PCS | Mod: ,,, | Performed by: CLINICAL MEDICAL LABORATORY

## 2023-01-12 PROCEDURE — 3288F PR FALLS RISK ASSESSMENT DOCUMENTED: ICD-10-PCS | Mod: ,,, | Performed by: FAMILY MEDICINE

## 2023-01-12 PROCEDURE — 1159F PR MEDICATION LIST DOCUMENTED IN MEDICAL RECORD: ICD-10-PCS | Mod: ,,, | Performed by: FAMILY MEDICINE

## 2023-01-12 PROCEDURE — 85025 COMPLETE CBC W/AUTO DIFF WBC: CPT | Mod: ,,, | Performed by: CLINICAL MEDICAL LABORATORY

## 2023-01-12 PROCEDURE — 80061 LIPID PANEL: CPT | Mod: ,,, | Performed by: CLINICAL MEDICAL LABORATORY

## 2023-01-12 PROCEDURE — 83036 HEMOGLOBIN GLYCOSYLATED A1C: CPT | Mod: ,,, | Performed by: CLINICAL MEDICAL LABORATORY

## 2023-01-12 PROCEDURE — 69210 EAR CERUMEN REMOVAL: ICD-10-PCS | Mod: ,,, | Performed by: FAMILY MEDICINE

## 2023-01-12 PROCEDURE — 69210 REMOVE IMPACTED EAR WAX UNI: CPT | Mod: ,,, | Performed by: FAMILY MEDICINE

## 2023-01-12 PROCEDURE — 99214 OFFICE O/P EST MOD 30 MIN: CPT | Mod: 25,,, | Performed by: FAMILY MEDICINE

## 2023-01-12 PROCEDURE — 3077F PR MOST RECENT SYSTOLIC BLOOD PRESSURE >= 140 MM HG: ICD-10-PCS | Mod: ,,, | Performed by: FAMILY MEDICINE

## 2023-01-12 PROCEDURE — 3288F FALL RISK ASSESSMENT DOCD: CPT | Mod: ,,, | Performed by: FAMILY MEDICINE

## 2023-01-12 PROCEDURE — 80061 LIPID PANEL: ICD-10-PCS | Mod: ,,, | Performed by: CLINICAL MEDICAL LABORATORY

## 2023-01-12 PROCEDURE — 3077F SYST BP >= 140 MM HG: CPT | Mod: ,,, | Performed by: FAMILY MEDICINE

## 2023-01-12 PROCEDURE — 1100F PTFALLS ASSESS-DOCD GE2>/YR: CPT | Mod: ,,, | Performed by: FAMILY MEDICINE

## 2023-01-12 PROCEDURE — 80048 BASIC METABOLIC PNL TOTAL CA: CPT | Mod: ,,, | Performed by: CLINICAL MEDICAL LABORATORY

## 2023-01-12 PROCEDURE — 3078F PR MOST RECENT DIASTOLIC BLOOD PRESSURE < 80 MM HG: ICD-10-PCS | Mod: ,,, | Performed by: FAMILY MEDICINE

## 2023-01-12 RX ORDER — LOSARTAN POTASSIUM AND HYDROCHLOROTHIAZIDE 25; 100 MG/1; MG/1
1 TABLET ORAL DAILY
Qty: 90 TABLET | Refills: 1 | Status: SHIPPED | OUTPATIENT
Start: 2023-01-12 | End: 2023-07-18 | Stop reason: SDUPTHER

## 2023-01-12 RX ORDER — LIRAGLUTIDE 6 MG/ML
1.8 INJECTION SUBCUTANEOUS DAILY
Qty: 27 ML | Refills: 0 | Status: SHIPPED | OUTPATIENT
Start: 2023-01-12 | End: 2023-04-17 | Stop reason: SDUPTHER

## 2023-01-12 RX ORDER — METFORMIN HYDROCHLORIDE 1000 MG/1
1000 TABLET ORAL 2 TIMES DAILY WITH MEALS
Qty: 180 TABLET | Refills: 0 | Status: SHIPPED | OUTPATIENT
Start: 2023-01-12 | End: 2023-04-17 | Stop reason: SDUPTHER

## 2023-01-12 RX ORDER — AMLODIPINE BESYLATE 10 MG/1
10 TABLET ORAL DAILY
Qty: 90 TABLET | Refills: 1 | Status: SHIPPED | OUTPATIENT
Start: 2023-01-12 | End: 2023-07-18 | Stop reason: SDUPTHER

## 2023-01-12 RX ORDER — INSULIN DETEMIR 100 [IU]/ML
50 INJECTION, SOLUTION SUBCUTANEOUS NIGHTLY
Qty: 45 ML | Refills: 0 | Status: SHIPPED | OUTPATIENT
Start: 2023-01-12 | End: 2023-02-23 | Stop reason: SDUPTHER

## 2023-01-12 NOTE — PROGRESS NOTES
Dontrell Shetty MD   [facility]  [address] [place] [address]     PATIENT NAME: Milton Johnson  : 1945  DATE: 23  MRN: [id number]      Billing Provider: Dontrell Shetty MD  Level of Service:   Patient PCP Information       Provider PCP Type    Dontrell Shetty MD General            Reason for Visit / Chief Complaint: Hypertension (Check up; Labs;Refills. ), Diabetes, and Ear Problem (Patient reports loss of hearing for the past 1.5 week. Sinus symptoms before loss of hearing. Denies any pain. )         History of Present Illness / Problem Focused Workflow     Milton Johnson presents to the clinic with Hypertension (Check up; Labs;Refills. ), Diabetes, and Ear Problem (Patient reports loss of hearing for the past 1.5 week. Sinus symptoms before loss of hearing. Denies any pain. )     HPI    Review of Systems     Review of Systems   Constitutional:  Negative for activity change, appetite change, fatigue and fever.   HENT:  Negative for nasal congestion, ear pain, hearing loss, sinus pressure/congestion and sore throat.    Respiratory:  Negative for cough, chest tightness and shortness of breath.    Cardiovascular:  Negative for chest pain and palpitations.   Gastrointestinal:  Negative for abdominal pain and fecal incontinence.   Genitourinary:  Negative for bladder incontinence, difficulty urinating and erectile dysfunction.   Musculoskeletal:  Negative for arthralgias.   Integumentary:  Negative for rash.   Neurological:  Negative for dizziness and headaches.      Medical / Social / Family History     Past Medical History:   Diagnosis Date    Diabetes mellitus     Glaucoma     High cholesterol     Hypertension        Past Surgical History:   Procedure Laterality Date    CATARACT EXTRACTION      EYE SURGERY         Social History  Milton Johnson  reports that he has never smoked. He has never used smokeless tobacco. He reports that he does not drink  "alcohol.    Family History  Milton Alex  family history is not on file.    Medications and Allergies     Medications  Outpatient Medications Marked as Taking for the 1/12/23 encounter (Office Visit) with Dontrell Shetty MD   Medication Sig Dispense Refill    aspirin 81 MG Chew Take 81 mg by mouth. 2 times per week      bimatoprost (LUMIGAN) 0.03 % ophthalmic drops 1 drop every evening.      carvediloL (COREG) 3.125 MG tablet Take 3.125 mg by mouth 2 (two) times daily with meals.      dorzolamide (TRUSOPT) 2 % ophthalmic solution 1 drop 3 (three) times daily.      pen needle, diabetic 31 gauge x 1/4" Ndle Inject 2 each into the skin 2 (two) times a day. 100 each 3    pravastatin (PRAVACHOL) 40 MG tablet Take 40 mg by mouth once daily.      [DISCONTINUED] amLODIPine (NORVASC) 10 MG tablet Take 1 tablet (10 mg total) by mouth once daily. 90 tablet 1    [DISCONTINUED] insulin detemir U-100 (LEVEMIR FLEXTOUCH U-100 INSULN) 100 unit/mL (3 mL) InPn pen Inject 50 Units into the skin every evening. 45 mL 0    [DISCONTINUED] liraglutide 0.6 mg/0.1 mL, 18 mg/3 mL, subq PNIJ (VICTOZA 3-LORI) 0.6 mg/0.1 mL (18 mg/3 mL) PnIj pen Inject 1.8 mg into the skin once daily. 27 mL 0    [DISCONTINUED] losartan-hydrochlorothiazide 100-25 mg (HYZAAR) 100-25 mg per tablet Take 1 tablet by mouth once daily. 90 tablet 1    [DISCONTINUED] metFORMIN (GLUCOPHAGE) 1000 MG tablet Take 1 tablet (1,000 mg total) by mouth 2 (two) times daily with meals. 180 tablet 0       Allergies  Review of patient's allergies indicates:  No Known Allergies    Physical Examination     Vitals:    01/12/23 0836   BP: (!) 140/72   Pulse: 75   Temp: 98.3 °F (36.8 °C)     Physical Exam  Constitutional:       General: He is not in acute distress.     Appearance: He is not ill-appearing.   HENT:      Head: Normocephalic and atraumatic.      Right Ear: Tympanic membrane and ear canal normal. There is impacted cerumen.      Left Ear: Tympanic membrane and " ear canal normal. There is impacted cerumen.      Nose: Nose normal. No congestion or rhinorrhea.   Eyes:      Pupils: Pupils are equal, round, and reactive to light.   Cardiovascular:      Rate and Rhythm: Normal rate and regular rhythm.      Pulses: Normal pulses.      Heart sounds: No murmur heard.  Pulmonary:      Effort: No respiratory distress.      Breath sounds: No wheezing, rhonchi or rales.   Abdominal:      General: Bowel sounds are normal.      Palpations: Abdomen is soft.      Tenderness: There is no abdominal tenderness.      Hernia: No hernia is present.   Musculoskeletal:      Cervical back: Normal range of motion and neck supple.   Lymphadenopathy:      Cervical: No cervical adenopathy.   Skin:     General: Skin is warm and dry.   Neurological:      Mental Status: He is alert.   Psychiatric:         Behavior: Behavior normal.         Thought Content: Thought content normal.        Assessment and Plan (including Health Maintenance)   :    Plan:         Health Maintenance Due   Topic Date Due    Hepatitis C Screening  Never done    Pneumococcal Vaccines (Age 65+) (1 - PCV) Never done    TETANUS VACCINE  Never done    Shingles Vaccine (1 of 2) Never done    COVID-19 Vaccine (4 - Booster for Moderna series) 12/23/2021    Hemoglobin A1c  01/11/2023       Problem List Items Addressed This Visit    None  Visit Diagnoses       Type 2 diabetes mellitus without complication, with long-term current use of insulin    -  Primary    Relevant Medications    metFORMIN (GLUCOPHAGE) 1000 MG tablet    liraglutide 0.6 mg/0.1 mL, 18 mg/3 mL, subq PNIJ (VICTOZA 3-LORI) 0.6 mg/0.1 mL (18 mg/3 mL) PnIj pen    insulin detemir U-100 (LEVEMIR FLEXTOUCH U-100 INSULN) 100 unit/mL (3 mL) InPn pen    Other Relevant Orders    CBC Auto Differential    Basic Metabolic Panel    Hemoglobin A1C    Hemoglobin A1c between 7.0% and 9.0%        Relevant Orders    Hemoglobin A1C    Hypertension, unspecified type        Relevant Orders     CBC Auto Differential    Basic Metabolic Panel    Lipid Panel    Hyperlipidemia, unspecified hyperlipidemia type        Relevant Orders    Lipid Panel          Type 2 diabetes mellitus without complication, with long-term current use of insulin  -     CBC Auto Differential; Future; Expected date: 01/12/2023  -     Basic Metabolic Panel; Future; Expected date: 01/12/2023  -     Hemoglobin A1C; Future; Expected date: 01/12/2023    Hemoglobin A1c between 7.0% and 9.0%  -     Hemoglobin A1C; Future; Expected date: 01/12/2023    Hypertension, unspecified type  -     CBC Auto Differential; Future; Expected date: 01/12/2023  -     Basic Metabolic Panel; Future; Expected date: 01/12/2023  -     Lipid Panel; Future; Expected date: 01/12/2023    Hyperlipidemia, unspecified hyperlipidemia type  -     Lipid Panel; Future; Expected date: 01/12/2023    Other orders  -     metFORMIN (GLUCOPHAGE) 1000 MG tablet; Take 1 tablet (1,000 mg total) by mouth 2 (two) times daily with meals.  Dispense: 180 tablet; Refill: 0  -     losartan-hydrochlorothiazide 100-25 mg (HYZAAR) 100-25 mg per tablet; Take 1 tablet by mouth once daily.  Dispense: 90 tablet; Refill: 1  -     liraglutide 0.6 mg/0.1 mL, 18 mg/3 mL, subq PNIJ (VICTOZA 3-LORI) 0.6 mg/0.1 mL (18 mg/3 mL) PnIj pen; Inject 1.8 mg into the skin once daily.  Dispense: 27 mL; Refill: 0  -     insulin detemir U-100 (LEVEMIR FLEXTOUCH U-100 INSULN) 100 unit/mL (3 mL) InPn pen; Inject 50 Units into the skin every evening.  Dispense: 45 mL; Refill: 0  -     amLODIPine (NORVASC) 10 MG tablet; Take 1 tablet (10 mg total) by mouth once daily.  Dispense: 90 tablet; Refill: 1       Health Maintenance Topics with due status: Not Due       Topic Last Completion Date    Lipid Panel 07/07/2022    Eye Exam 09/21/2022    Diabetes Urine Screening 10/11/2022       Ear Cerumen Removal    Date/Time: 1/12/2023 7:40 AM  Performed by: Dontrell Shetty MD  Authorized by: Dontrell Shetty MD     Time  "out: Immediately prior to procedure a "time out" was called to verify the correct patient, procedure, equipment, support staff and site/side marked as required.    Consent Done?:  Yes (Verbal)    Local anesthetic:  None  Medication Used:  Cerumenex  Location details:  Both ears  Procedure type: curette and irrigation    Cerumen  Removal Results:  Cerumen completely removed  Patient tolerance:  Patient tolerated the procedure well with no immediate complications     Future Appointments   Date Time Provider Department Center   1/18/2023  1:00 PM SANDRA Stubbs [facility] ZAYNAB [place]        No follow-ups on file.       Signature:  Dontrell Shetty MD  [facility]  [address] [place] [address]  [phone] Phone  [phone] Fax    Date of encounter: 1/12/23    "

## 2023-01-18 ENCOUNTER — PATIENT OUTREACH (OUTPATIENT)
Dept: FAMILY MEDICINE | Facility: CLINIC | Age: 78
End: 2023-01-18
Payer: MEDICARE

## 2023-01-18 ENCOUNTER — OFFICE VISIT (OUTPATIENT)
Dept: FAMILY MEDICINE | Facility: CLINIC | Age: 78
End: 2023-01-18
Payer: MEDICARE

## 2023-01-18 VITALS
WEIGHT: 223 LBS | BODY MASS INDEX: 29.55 KG/M2 | TEMPERATURE: 98 F | HEIGHT: 73 IN | RESPIRATION RATE: 20 BRPM | OXYGEN SATURATION: 97 % | HEART RATE: 97 BPM | DIASTOLIC BLOOD PRESSURE: 82 MMHG | SYSTOLIC BLOOD PRESSURE: 130 MMHG

## 2023-01-18 DIAGNOSIS — Z00.00 ENCOUNTER FOR SUBSEQUENT ANNUAL WELLNESS VISIT (AWV) IN MEDICARE PATIENT: Primary | ICD-10-CM

## 2023-01-18 DIAGNOSIS — Z79.4 TYPE 2 DIABETES MELLITUS WITHOUT COMPLICATION, WITH LONG-TERM CURRENT USE OF INSULIN: ICD-10-CM

## 2023-01-18 DIAGNOSIS — E78.5 HYPERLIPIDEMIA, UNSPECIFIED HYPERLIPIDEMIA TYPE: ICD-10-CM

## 2023-01-18 DIAGNOSIS — Z00.00 ENCOUNTER FOR PREVENTIVE HEALTH EXAMINATION: ICD-10-CM

## 2023-01-18 DIAGNOSIS — E11.9 TYPE 2 DIABETES MELLITUS WITHOUT COMPLICATION, WITH LONG-TERM CURRENT USE OF INSULIN: ICD-10-CM

## 2023-01-18 DIAGNOSIS — I10 HYPERTENSION, UNSPECIFIED TYPE: ICD-10-CM

## 2023-01-18 LAB
LEFT EYE DM RETINOPATHY: NORMAL
RIGHT EYE DM RETINOPATHY: NORMAL

## 2023-01-18 PROCEDURE — G0439 PPPS, SUBSEQ VISIT: HCPCS | Mod: ,,, | Performed by: NURSE PRACTITIONER

## 2023-01-18 PROCEDURE — 3288F PR FALLS RISK ASSESSMENT DOCUMENTED: ICD-10-PCS | Mod: ,,, | Performed by: NURSE PRACTITIONER

## 2023-01-18 PROCEDURE — 3075F PR MOST RECENT SYSTOLIC BLOOD PRESS GE 130-139MM HG: ICD-10-PCS | Mod: ,,, | Performed by: NURSE PRACTITIONER

## 2023-01-18 PROCEDURE — G0439 PR MEDICARE ANNUAL WELLNESS SUBSEQUENT VISIT: ICD-10-PCS | Mod: ,,, | Performed by: NURSE PRACTITIONER

## 2023-01-18 PROCEDURE — 3288F FALL RISK ASSESSMENT DOCD: CPT | Mod: ,,, | Performed by: NURSE PRACTITIONER

## 2023-01-18 PROCEDURE — 1126F PR PAIN SEVERITY QUANTIFIED, NO PAIN PRESENT: ICD-10-PCS | Mod: ,,, | Performed by: NURSE PRACTITIONER

## 2023-01-18 PROCEDURE — 3075F SYST BP GE 130 - 139MM HG: CPT | Mod: ,,, | Performed by: NURSE PRACTITIONER

## 2023-01-18 PROCEDURE — 1100F PR PT FALLS ASSESS DOC 2+ FALLS/FALL W/INJURY/YR: ICD-10-PCS | Mod: ,,, | Performed by: NURSE PRACTITIONER

## 2023-01-18 PROCEDURE — 3079F DIAST BP 80-89 MM HG: CPT | Mod: ,,, | Performed by: NURSE PRACTITIONER

## 2023-01-18 PROCEDURE — 1126F AMNT PAIN NOTED NONE PRSNT: CPT | Mod: ,,, | Performed by: NURSE PRACTITIONER

## 2023-01-18 PROCEDURE — 1160F RVW MEDS BY RX/DR IN RCRD: CPT | Mod: ,,, | Performed by: NURSE PRACTITIONER

## 2023-01-18 PROCEDURE — 1160F PR REVIEW ALL MEDS BY PRESCRIBER/CLIN PHARMACIST DOCUMENTED: ICD-10-PCS | Mod: ,,, | Performed by: NURSE PRACTITIONER

## 2023-01-18 PROCEDURE — 3079F PR MOST RECENT DIASTOLIC BLOOD PRESSURE 80-89 MM HG: ICD-10-PCS | Mod: ,,, | Performed by: NURSE PRACTITIONER

## 2023-01-18 PROCEDURE — 1159F MED LIST DOCD IN RCRD: CPT | Mod: ,,, | Performed by: NURSE PRACTITIONER

## 2023-01-18 PROCEDURE — 1100F PTFALLS ASSESS-DOCD GE2>/YR: CPT | Mod: ,,, | Performed by: NURSE PRACTITIONER

## 2023-01-18 PROCEDURE — 1159F PR MEDICATION LIST DOCUMENTED IN MEDICAL RECORD: ICD-10-PCS | Mod: ,,, | Performed by: NURSE PRACTITIONER

## 2023-01-18 NOTE — PROGRESS NOTES
.     [facility]     PATIENT NAME: Milton Johnson   : 1945    AGE: 77 y.o. DATE: 2023   MRN: [id number]        Reason for Visit / Chief Complaint: Medicare AWV Follow Up ([employer] Subsequent)        Milton Johnson presents for a subsequent Medicare AWV today.     The following components were reviewed and updated:    Medical/Social/Family History:  Past Medical History:   Diagnosis Date    Diabetes mellitus     Diabetes mellitus, type 2     Glaucoma     High cholesterol     Hypertension         Family History   Adopted: Yes        Social History     Tobacco Use   Smoking Status Never    Passive exposure: Past (father)   Smokeless Tobacco Never      Social History     Substance and Sexual Activity   Alcohol Use Yes    Alcohol/week: 1.0 standard drink    Types: 1 Cans of beer per week       Family History   Adopted: Yes       Past Surgical History:   Procedure Laterality Date    CATARACT EXTRACTION      EYE SURGERY           Allergies and Current Medications   Review of patient's allergies indicates:  No Known Allergies    Current Outpatient Medications:     amLODIPine (NORVASC) 10 MG tablet, Take 1 tablet (10 mg total) by mouth once daily., Disp: 90 tablet, Rfl: 1    aspirin 81 MG Chew, Take 81 mg by mouth. 2 times per week, Disp: , Rfl:     bimatoprost (LUMIGAN) 0.03 % ophthalmic drops, 1 drop every evening., Disp: , Rfl:     carvediloL (COREG) 3.125 MG tablet, Take 3.125 mg by mouth 2 (two) times daily with meals., Disp: , Rfl:     dorzolamide (TRUSOPT) 2 % ophthalmic solution, 1 drop 3 (three) times daily., Disp: , Rfl:     insulin detemir U-100 (LEVEMIR FLEXTOUCH U-100 INSULN) 100 unit/mL (3 mL) InPn pen, Inject 50 Units into the skin every evening. (Patient taking differently: Inject 65 Units into the skin every evening.), Disp: 45 mL, Rfl: 0    liraglutide 0.6 mg/0.1 mL, 18 mg/3 mL, subq PNIJ (VICTOZA 3-LORI) 0.6 mg/0.1 mL (18 mg/3 mL)  "PnIj pen, Inject 1.8 mg into the skin once daily., Disp: 27 mL, Rfl: 0    losartan-hydrochlorothiazide 100-25 mg (HYZAAR) 100-25 mg per tablet, Take 1 tablet by mouth once daily., Disp: 90 tablet, Rfl: 1    metFORMIN (GLUCOPHAGE) 1000 MG tablet, Take 1 tablet (1,000 mg total) by mouth 2 (two) times daily with meals., Disp: 180 tablet, Rfl: 0    pen needle, diabetic 31 gauge x 1/4" Ndle, Inject 2 each into the skin 2 (two) times a day., Disp: 100 each, Rfl: 3    doxycycline (VIBRAMYCIN) 100 MG Cap, Take 100 mg by mouth every 12 (twelve) hours., Disp: , Rfl:     pravastatin (PRAVACHOL) 40 MG tablet, Take 40 mg by mouth once daily., Disp: , Rfl:     Health Risk Assessment   Fall Risk: yes   Obesity: BMI 29.42     Advance Directive: yes    Depression: phq9 = 0    HTN: yes DASH diet, exercise, weight management, med compliance, home BP monitoring, and follow-up discussed.   T2DM: yes  diabetic diet, glucose monitoring, activity level, weight management, med compliance, and follow-up discussed.   Tobacco use: non user  STI: no    Alcohol misuse: no   Statin Use: yes    Health Maintenance   Last eye exam: 1/13/23   Last CV screen with lipids: 1/12/23   Diabetes screening with fasting glucose or A1c: 1/12/23   Colonoscopy: 10/14/21 - repeat in 3 years   Flu Vaccine: 10/3/2022   Pneumonia vaccines: had at [facility]   COVID vaccine: yes   Last PSA screen: 10/11/2022   Hepatitis C Screen: declined    Incontinence  Bowel: no  Bladder: no    Lab results available in Epic or see dates from [facility] above:   Lab Results   Component Value Date    CHOL 131 01/12/2023     Lab Results   Component Value Date    HDL 46 01/12/2023     Lab Results   Component Value Date    LDLCALC 65 01/12/2023     Lab Results   Component Value Date    TRIG 98 01/12/2023     Lab Results   Component Value Date    CHOLHDL 2.8 01/12/2023       Lab Results   Component Value Date    HGBA1C 9.3 (H) 01/12/2023       Sodium   Date Value Ref Range Status   01/12/2023 " 130 (L) 136 - 145 mmol/L Final     Potassium   Date Value Ref Range Status   01/12/2023 4.5 3.5 - 5.1 mmol/L Final     Chloride   Date Value Ref Range Status   01/12/2023 94 (L) 98 - 107 mmol/L Final     CO2   Date Value Ref Range Status   01/12/2023 26 21 - 32 mmol/L Final     Glucose   Date Value Ref Range Status   01/12/2023 131 (H) 74 - 106 mg/dL Final     BUN   Date Value Ref Range Status   01/12/2023 17 7 - 18 mg/dL Final     Creatinine   Date Value Ref Range Status   01/12/2023 1.05 0.70 - 1.30 mg/dL Final     Calcium   Date Value Ref Range Status   01/12/2023 9.5 8.5 - 10.1 mg/dL Final     Total Protein   Date Value Ref Range Status   10/11/2022 7.4 6.4 - 8.2 g/dL Final     Albumin   Date Value Ref Range Status   10/11/2022 4.3 3.5 - 5.0 g/dL Final     Bilirubin, Total   Date Value Ref Range Status   10/11/2022 0.6 >0.0 - 1.2 mg/dL Final     Alk Phos   Date Value Ref Range Status   10/11/2022 63 45 - 115 U/L Final     AST   Date Value Ref Range Status   10/11/2022 7 (L) 15 - 37 U/L Final     ALT   Date Value Ref Range Status   10/11/2022 22 16 - 61 U/L Final     Anion Gap   Date Value Ref Range Status   01/12/2023 15 7 - 16 mmol/L Final     eGFR    Date Value Ref Range Status   01/14/2021 84       Comment:     The above 20 analytes were performed by [facility] [id number] [place] [address]     eGFR   Date Value Ref Range Status   01/03/2022 68 >=60 mL/min/1.73m² Final         Lab Results   Component Value Date    PSA 1.300 10/11/2022    PSA 0.991 11/19/2020           Care Team   PCP: Dr. Shetty    Eye specialist: Dr. Shannon   Cardiologist: Dr. Oquendo   Derm: Dr. Tyson        **See Completed Assessments for Annual Wellness visit within the encounter summary    The following assessments were completed & reviewed:  Depression Screening  Cognitive function Screening  Timed Get Up Test  Whisper Test  Vision Screen  Health Risk Assessment  Checklist of ADLs and  "IADLs      Objective  /82 (BP Location: Right arm, Patient Position: Sitting, BP Method: Large (Manual))   Pulse 97   Temp 97.8 °F (36.6 °C) (Oral)   Resp 20   Ht 6' 1" (1.854 m)   Wt 101.2 kg (223 lb)   SpO2 97%   BMI 29.42 kg/m²        Physical Exam  Constitutional:       Appearance: Normal appearance.   HENT:      Head: Normocephalic.      Right Ear: Tympanic membrane normal.      Left Ear: Tympanic membrane normal.      Mouth/Throat:      Mouth: Mucous membranes are moist.   Cardiovascular:      Rate and Rhythm: Normal rate and regular rhythm.      Heart sounds: Normal heart sounds. No murmur heard.  Pulmonary:      Effort: Pulmonary effort is normal. No respiratory distress.      Breath sounds: Normal breath sounds.   Skin:     General: Skin is warm and dry.   Neurological:      Mental Status: He is alert.   Psychiatric:         Mood and Affect: Mood normal.       Assessment:     1. Encounter for subsequent annual wellness visit (AWV) in Medicare patient    2. Type 2 diabetes mellitus without complication, with long-term current use of insulin    3. Hypertension, unspecified type    4. Hyperlipidemia, unspecified hyperlipidemia type    5. Encounter for preventive health examination    6. BMI 29.0-29.9,adult         Plan:    Referrals:   None at present         Discussed and provided with a screening schedule and personal prevention plan in accordance with USPSTF age appropriate recommendations and Medicare screening guidelines.   Education, counseling, and referrals were provided as needed.  After Visit Summary printed and given to patient which includes written education and a list of any referrals if indicated.     F/u plan for yearly AWV.    Signature: JERARDO Lawrence    I offered to discuss advanced care planning, including how to pick a person who would make decisions for you if you were unable to make them for yourself, called a health care power of , and what kind of decisions " you might make such as use of life sustaining treatments such as ventilators and tube feeding when faced with a life limiting illness recorded on a living will that they will need to know. (How you want to be cared for as you near the end of your natural life)     X  Patient has advanced directives written and agrees to provide copies to the institution.

## 2023-01-18 NOTE — PATIENT INSTRUCTIONS
Counseling and Referral of Other Preventative  (Italic type indicates deductible and co-insurance are waived)    Patient Name: Milton Johnson  Today's Date: 1/18/2023    Health Maintenance       Date Due Completion Date    Pneumococcal Vaccines (Age 65+) (1 - PCV) Never done ---    TETANUS VACCINE Never done ---    Shingles Vaccine (1 of 2) Never done ---    COVID-19 Vaccine (4 - Booster for Moderna series) 12/23/2021 10/28/2021    Hepatitis C Screening 01/18/2023 (Originally 1945) ---    Hemoglobin A1c 04/12/2023 1/12/2023    Diabetes Urine Screening 10/11/2023 10/11/2022    Lipid Panel 01/12/2024 1/12/2023    Eye Exam 01/13/2024 1/13/2023    Override on 9/21/2022: Done        No orders of the defined types were placed in this encounter.      The following information is provided to all patients.  This information is to help you find resources for any of the problems found today that may be affecting your health:                Living healthy guide: www.[facility].[place].gov      Understanding Diabetes: www.diabetes.org      Eating healthy: www.cdc.gov/healthyweight      CDC home safety checklist: www.cdc.gov/steadi/patient.html      Agency on Aging: www.goea.[place].gov      Alcoholics anonymous (AA): www.aa.org      Physical Activity: www.hiwot.nih.gov/uq1ljyt      Tobacco use: www.quitwithusla.org

## 2023-02-09 DIAGNOSIS — Z71.89 COMPLEX CARE COORDINATION: ICD-10-CM

## 2023-02-14 ENCOUNTER — OFFICE VISIT (OUTPATIENT)
Dept: FAMILY MEDICINE | Facility: CLINIC | Age: 78
End: 2023-02-14
Payer: MEDICARE

## 2023-02-14 VITALS
DIASTOLIC BLOOD PRESSURE: 88 MMHG | HEART RATE: 79 BPM | OXYGEN SATURATION: 97 % | BODY MASS INDEX: 29.69 KG/M2 | SYSTOLIC BLOOD PRESSURE: 138 MMHG | WEIGHT: 224 LBS | HEIGHT: 73 IN | TEMPERATURE: 98 F

## 2023-02-14 DIAGNOSIS — Z79.4 TYPE 2 DIABETES MELLITUS WITHOUT COMPLICATION, WITH LONG-TERM CURRENT USE OF INSULIN: Primary | ICD-10-CM

## 2023-02-14 DIAGNOSIS — E11.9 TYPE 2 DIABETES MELLITUS WITHOUT COMPLICATION, WITH LONG-TERM CURRENT USE OF INSULIN: Primary | ICD-10-CM

## 2023-02-14 PROCEDURE — 3288F PR FALLS RISK ASSESSMENT DOCUMENTED: ICD-10-PCS | Mod: ,,, | Performed by: FAMILY MEDICINE

## 2023-02-14 PROCEDURE — 3288F FALL RISK ASSESSMENT DOCD: CPT | Mod: ,,, | Performed by: FAMILY MEDICINE

## 2023-02-14 PROCEDURE — 1101F PR PT FALLS ASSESS DOC 0-1 FALLS W/OUT INJ PAST YR: ICD-10-PCS | Mod: ,,, | Performed by: FAMILY MEDICINE

## 2023-02-14 PROCEDURE — 1159F PR MEDICATION LIST DOCUMENTED IN MEDICAL RECORD: ICD-10-PCS | Mod: ,,, | Performed by: FAMILY MEDICINE

## 2023-02-14 PROCEDURE — 99213 OFFICE O/P EST LOW 20 MIN: CPT | Mod: ,,, | Performed by: FAMILY MEDICINE

## 2023-02-14 PROCEDURE — 1159F MED LIST DOCD IN RCRD: CPT | Mod: ,,, | Performed by: FAMILY MEDICINE

## 2023-02-14 PROCEDURE — 99213 PR OFFICE/OUTPT VISIT, EST, LEVL III, 20-29 MIN: ICD-10-PCS | Mod: ,,, | Performed by: FAMILY MEDICINE

## 2023-02-14 PROCEDURE — 3075F PR MOST RECENT SYSTOLIC BLOOD PRESS GE 130-139MM HG: ICD-10-PCS | Mod: ,,, | Performed by: FAMILY MEDICINE

## 2023-02-14 PROCEDURE — 3075F SYST BP GE 130 - 139MM HG: CPT | Mod: ,,, | Performed by: FAMILY MEDICINE

## 2023-02-14 PROCEDURE — 1101F PT FALLS ASSESS-DOCD LE1/YR: CPT | Mod: ,,, | Performed by: FAMILY MEDICINE

## 2023-02-14 PROCEDURE — 3079F PR MOST RECENT DIASTOLIC BLOOD PRESSURE 80-89 MM HG: ICD-10-PCS | Mod: ,,, | Performed by: FAMILY MEDICINE

## 2023-02-14 PROCEDURE — 3079F DIAST BP 80-89 MM HG: CPT | Mod: ,,, | Performed by: FAMILY MEDICINE

## 2023-02-14 RX ORDER — PIOGLITAZONEHYDROCHLORIDE 15 MG/1
15 TABLET ORAL DAILY
Qty: 90 TABLET | Refills: 3 | Status: SHIPPED | OUTPATIENT
Start: 2023-02-14 | End: 2023-07-18 | Stop reason: SDUPTHER

## 2023-02-14 NOTE — PROGRESS NOTES
Dontrell Shetty MD   [facility]  [address] [place] [address]     PATIENT NAME: Milton Johnson  : 1945  DATE: 23  MRN: [id number]      Billing Provider: Dontrell Shetty MD  Level of Service:   Patient PCP Information       Provider PCP Type    Dontrell Shetty MD General            Reason for Visit / Chief Complaint: Diabetes (Month check up for blood sugars)         History of Present Illness / Problem Focused Workflow     Milton Johnson presents to the clinic with Diabetes (Month check up for blood sugars)     HPI    Review of Systems     Review of Systems   Constitutional:  Negative for activity change, appetite change, fatigue and fever.   HENT:  Negative for nasal congestion, ear pain, hearing loss, sinus pressure/congestion and sore throat.    Respiratory:  Negative for cough, chest tightness and shortness of breath.    Cardiovascular:  Negative for chest pain and palpitations.   Gastrointestinal:  Negative for abdominal pain and fecal incontinence.   Genitourinary:  Negative for bladder incontinence, difficulty urinating and erectile dysfunction.   Musculoskeletal:  Negative for arthralgias.   Integumentary:  Negative for rash.   Neurological:  Negative for dizziness and headaches.      Medical / Social / Family History     Past Medical History:   Diagnosis Date    Diabetes mellitus     Diabetes mellitus, type 2     Glaucoma     High cholesterol     Hypertension        Past Surgical History:   Procedure Laterality Date    CATARACT EXTRACTION      EYE SURGERY         Social History  Milton Johnson  reports that he has never smoked. He has been exposed to tobacco smoke. He has never used smokeless tobacco. He reports current alcohol use of about 1.0 standard drink per week. He reports that he does not use drugs.    Family History  Milton Johnson  family history is not on file. He was adopted.    Medications and Allergies     Medications  Outpatient  "Medications Marked as Taking for the 2/14/23 encounter (Office Visit) with Dontrell Shetty MD   Medication Sig Dispense Refill    amLODIPine (NORVASC) 10 MG tablet Take 1 tablet (10 mg total) by mouth once daily. 90 tablet 1    aspirin 81 MG Chew Take 81 mg by mouth. 2 times per week      bimatoprost (LUMIGAN) 0.03 % ophthalmic drops 1 drop every evening.      carvediloL (COREG) 3.125 MG tablet Take 3.125 mg by mouth 2 (two) times daily with meals.      dorzolamide (TRUSOPT) 2 % ophthalmic solution 1 drop 3 (three) times daily.      insulin detemir U-100 (LEVEMIR FLEXTOUCH U-100 INSULN) 100 unit/mL (3 mL) InPn pen Inject 50 Units into the skin every evening. (Patient taking differently: Inject 65 Units into the skin every evening.) 45 mL 0    liraglutide 0.6 mg/0.1 mL, 18 mg/3 mL, subq PNIJ (VICTOZA 3-LORI) 0.6 mg/0.1 mL (18 mg/3 mL) PnIj pen Inject 1.8 mg into the skin once daily. 27 mL 0    losartan-hydrochlorothiazide 100-25 mg (HYZAAR) 100-25 mg per tablet Take 1 tablet by mouth once daily. 90 tablet 1    metFORMIN (GLUCOPHAGE) 1000 MG tablet Take 1 tablet (1,000 mg total) by mouth 2 (two) times daily with meals. 180 tablet 0    pen needle, diabetic 31 gauge x 1/4" Ndle Inject 2 each into the skin 2 (two) times a day. 100 each 3    pravastatin (PRAVACHOL) 40 MG tablet Take 40 mg by mouth once daily.         Allergies  Review of patient's allergies indicates:  No Known Allergies    Physical Examination     Vitals:    02/14/23 0937   BP: 138/88   Pulse: 79   Temp: 97.9 °F (36.6 °C)     Physical Exam  Constitutional:       General: He is not in acute distress.     Appearance: He is not ill-appearing.   HENT:      Head: Normocephalic and atraumatic.      Right Ear: Tympanic membrane and ear canal normal.      Left Ear: Tympanic membrane and ear canal normal.      Nose: Nose normal. No congestion or rhinorrhea.   Eyes:      Pupils: Pupils are equal, round, and reactive to light.   Cardiovascular:      Rate and " Rhythm: Normal rate and regular rhythm.      Pulses: Normal pulses.      Heart sounds: No murmur heard.  Pulmonary:      Effort: No respiratory distress.      Breath sounds: No wheezing, rhonchi or rales.   Abdominal:      General: Bowel sounds are normal.      Palpations: Abdomen is soft.      Tenderness: There is no abdominal tenderness.      Hernia: No hernia is present.   Musculoskeletal:      Cervical back: Normal range of motion and neck supple.   Lymphadenopathy:      Cervical: No cervical adenopathy.   Skin:     General: Skin is warm and dry.   Neurological:      Mental Status: He is alert.   Psychiatric:         Behavior: Behavior normal.         Thought Content: Thought content normal.        Assessment and Plan (including Health Maintenance)   :    Plan:         Health Maintenance Due   Topic Date Due    Hepatitis C Screening  Never done    Pneumococcal Vaccines (Age 65+) (1 - PCV) Never done    TETANUS VACCINE  Never done    Shingles Vaccine (1 of 2) Never done       Problem List Items Addressed This Visit    None    There are no diagnoses linked to this encounter.   Health Maintenance Topics with due status: Not Due       Topic Last Completion Date    Diabetes Urine Screening 10/11/2022    Lipid Panel 01/12/2023    Hemoglobin A1c 01/12/2023    Eye Exam 01/13/2023       Procedures     Future Appointments   Date Time Provider Department Center   1/23/2024  1:00 PM SANDRA Stubbs [facility] [place]        No follow-ups on file.       Signature:  Dontrell Shetty MD  [facility]  [address] [place] [address]  [phone] Phone  [phone] Fax    Date of encounter: 2/14/23

## 2023-02-23 RX ORDER — INSULIN DETEMIR 100 [IU]/ML
65 INJECTION, SOLUTION SUBCUTANEOUS NIGHTLY
Qty: 58.5 ML | Refills: 0 | Status: SHIPPED | OUTPATIENT
Start: 2023-02-23 | End: 2023-04-17 | Stop reason: SDUPTHER

## 2023-04-17 ENCOUNTER — OFFICE VISIT (OUTPATIENT)
Dept: FAMILY MEDICINE | Facility: CLINIC | Age: 78
End: 2023-04-17
Payer: MEDICARE

## 2023-04-17 VITALS
WEIGHT: 227.81 LBS | HEART RATE: 66 BPM | DIASTOLIC BLOOD PRESSURE: 80 MMHG | HEIGHT: 73 IN | BODY MASS INDEX: 30.19 KG/M2 | TEMPERATURE: 98 F | SYSTOLIC BLOOD PRESSURE: 136 MMHG | OXYGEN SATURATION: 97 %

## 2023-04-17 DIAGNOSIS — R73.09 HEMOGLOBIN A1C GREATER THAN 9.0%: ICD-10-CM

## 2023-04-17 DIAGNOSIS — I10 HYPERTENSION, UNSPECIFIED TYPE: ICD-10-CM

## 2023-04-17 DIAGNOSIS — Z79.4 TYPE 2 DIABETES MELLITUS WITHOUT COMPLICATION, WITH LONG-TERM CURRENT USE OF INSULIN: Primary | ICD-10-CM

## 2023-04-17 DIAGNOSIS — E11.9 TYPE 2 DIABETES MELLITUS WITHOUT COMPLICATION, WITH LONG-TERM CURRENT USE OF INSULIN: Primary | ICD-10-CM

## 2023-04-17 LAB
ALBUMIN SERPL BCP-MCNC: 4.1 G/DL (ref 3.5–5)
ALBUMIN/GLOB SERPL: 1.4 {RATIO}
ALP SERPL-CCNC: 59 U/L (ref 45–115)
ALT SERPL W P-5'-P-CCNC: 17 U/L (ref 16–61)
ANION GAP SERPL CALCULATED.3IONS-SCNC: 12 MMOL/L (ref 7–16)
AST SERPL W P-5'-P-CCNC: 11 U/L (ref 15–37)
BASOPHILS # BLD AUTO: 0.09 K/UL (ref 0–0.2)
BASOPHILS NFR BLD AUTO: 1.3 % (ref 0–1)
BILIRUB SERPL-MCNC: 0.7 MG/DL (ref ?–1.2)
BUN SERPL-MCNC: 18 MG/DL (ref 7–18)
BUN/CREAT SERPL: 15 (ref 6–20)
CALCIUM SERPL-MCNC: 9.6 MG/DL (ref 8.5–10.1)
CHLORIDE SERPL-SCNC: 100 MMOL/L (ref 98–107)
CO2 SERPL-SCNC: 25 MMOL/L (ref 21–32)
CREAT SERPL-MCNC: 1.17 MG/DL (ref 0.7–1.3)
DIFFERENTIAL METHOD BLD: ABNORMAL
EGFR (NO RACE VARIABLE) (RUSH/TITUS): 65 ML/MIN/1.73M²
EOSINOPHIL # BLD AUTO: 0.15 K/UL (ref 0–0.5)
EOSINOPHIL NFR BLD AUTO: 2.1 % (ref 1–4)
ERYTHROCYTE [DISTWIDTH] IN BLOOD BY AUTOMATED COUNT: 13 % (ref 11.5–14.5)
EST. AVERAGE GLUCOSE BLD GHB EST-MCNC: 177 MG/DL
GLOBULIN SER-MCNC: 2.9 G/DL (ref 2–4)
GLUCOSE SERPL-MCNC: 112 MG/DL (ref 74–106)
HBA1C MFR BLD HPLC: 7.9 % (ref 4.5–6.6)
HCT VFR BLD AUTO: 45.8 % (ref 40–54)
HGB BLD-MCNC: 15.4 G/DL (ref 13.5–18)
IMM GRANULOCYTES # BLD AUTO: 0.02 K/UL (ref 0–0.04)
IMM GRANULOCYTES NFR BLD: 0.3 % (ref 0–0.4)
LYMPHOCYTES # BLD AUTO: 1.28 K/UL (ref 1–4.8)
LYMPHOCYTES NFR BLD AUTO: 17.8 % (ref 27–41)
MCH RBC QN AUTO: 28.5 PG (ref 27–31)
MCHC RBC AUTO-ENTMCNC: 33.6 G/DL (ref 32–36)
MCV RBC AUTO: 84.8 FL (ref 80–96)
MONOCYTES # BLD AUTO: 0.65 K/UL (ref 0–0.8)
MONOCYTES NFR BLD AUTO: 9 % (ref 2–6)
MPC BLD CALC-MCNC: 9.2 FL (ref 9.4–12.4)
NEUTROPHILS # BLD AUTO: 5 K/UL (ref 1.8–7.7)
NEUTROPHILS NFR BLD AUTO: 69.5 % (ref 53–65)
NRBC # BLD AUTO: 0 X10E3/UL
NRBC, AUTO (.00): 0 %
PLATELET # BLD AUTO: 318 K/UL (ref 150–400)
POTASSIUM SERPL-SCNC: 4.1 MMOL/L (ref 3.5–5.1)
PROT SERPL-MCNC: 7 G/DL (ref 6.4–8.2)
RBC # BLD AUTO: 5.4 M/UL (ref 4.6–6.2)
SODIUM SERPL-SCNC: 133 MMOL/L (ref 136–145)
WBC # BLD AUTO: 7.19 K/UL (ref 4.5–11)

## 2023-04-17 PROCEDURE — 3079F PR MOST RECENT DIASTOLIC BLOOD PRESSURE 80-89 MM HG: ICD-10-PCS | Mod: ,,, | Performed by: FAMILY MEDICINE

## 2023-04-17 PROCEDURE — 80053 COMPREHEN METABOLIC PANEL: CPT | Mod: ,,, | Performed by: CLINICAL MEDICAL LABORATORY

## 2023-04-17 PROCEDURE — 3075F PR MOST RECENT SYSTOLIC BLOOD PRESS GE 130-139MM HG: ICD-10-PCS | Mod: ,,, | Performed by: FAMILY MEDICINE

## 2023-04-17 PROCEDURE — 99214 OFFICE O/P EST MOD 30 MIN: CPT | Mod: ,,, | Performed by: FAMILY MEDICINE

## 2023-04-17 PROCEDURE — 83036 HEMOGLOBIN A1C: ICD-10-PCS | Mod: ,,, | Performed by: CLINICAL MEDICAL LABORATORY

## 2023-04-17 PROCEDURE — 80053 COMPREHENSIVE METABOLIC PANEL: ICD-10-PCS | Mod: ,,, | Performed by: CLINICAL MEDICAL LABORATORY

## 2023-04-17 PROCEDURE — 85025 CBC WITH DIFFERENTIAL: ICD-10-PCS | Mod: ,,, | Performed by: CLINICAL MEDICAL LABORATORY

## 2023-04-17 PROCEDURE — 83036 HEMOGLOBIN GLYCOSYLATED A1C: CPT | Mod: ,,, | Performed by: CLINICAL MEDICAL LABORATORY

## 2023-04-17 PROCEDURE — 3075F SYST BP GE 130 - 139MM HG: CPT | Mod: ,,, | Performed by: FAMILY MEDICINE

## 2023-04-17 PROCEDURE — 3079F DIAST BP 80-89 MM HG: CPT | Mod: ,,, | Performed by: FAMILY MEDICINE

## 2023-04-17 PROCEDURE — 85025 COMPLETE CBC W/AUTO DIFF WBC: CPT | Mod: ,,, | Performed by: CLINICAL MEDICAL LABORATORY

## 2023-04-17 PROCEDURE — 99214 PR OFFICE/OUTPT VISIT, EST, LEVL IV, 30-39 MIN: ICD-10-PCS | Mod: ,,, | Performed by: FAMILY MEDICINE

## 2023-04-17 RX ORDER — PEN NEEDLE, DIABETIC 29 G X1/2"
2 NEEDLE, DISPOSABLE MISCELLANEOUS 2 TIMES DAILY
Qty: 100 EACH | Refills: 3 | Status: SHIPPED | OUTPATIENT
Start: 2023-04-17 | End: 2023-10-19 | Stop reason: SDUPTHER

## 2023-04-17 RX ORDER — LIRAGLUTIDE 6 MG/ML
1.8 INJECTION SUBCUTANEOUS DAILY
Qty: 27 ML | Refills: 0 | Status: SHIPPED | OUTPATIENT
Start: 2023-04-17 | End: 2023-07-18 | Stop reason: SDUPTHER

## 2023-04-17 RX ORDER — INSULIN DETEMIR 100 [IU]/ML
65 INJECTION, SOLUTION SUBCUTANEOUS NIGHTLY
Qty: 58.5 ML | Refills: 0 | Status: SHIPPED | OUTPATIENT
Start: 2023-04-17 | End: 2023-07-18 | Stop reason: SDUPTHER

## 2023-04-17 RX ORDER — METFORMIN HYDROCHLORIDE 1000 MG/1
1000 TABLET ORAL 2 TIMES DAILY WITH MEALS
Qty: 180 TABLET | Refills: 0 | Status: SHIPPED | OUTPATIENT
Start: 2023-04-17 | End: 2023-07-18 | Stop reason: SDUPTHER

## 2023-04-17 NOTE — PROGRESS NOTES
Dontrell Shetty MD   [facility]  [address] [place] [address]     PATIENT NAME: Milton Johnson  : 1945  DATE: 23  MRN: [id number]      Billing Provider: Dontrell Shetty MD  Level of Service:   Patient PCP Information       Provider PCP Type    Dontrell Shetty MD General            Reason for Visit / Chief Complaint: Diabetes (Check up, lab, refills )         History of Present Illness / Problem Focused Workflow     Milton Johnson presents to the clinic with Diabetes (Check up, lab, refills )     HPI    Review of Systems     Review of Systems   Constitutional:  Negative for activity change, appetite change, fatigue and fever.   HENT:  Negative for nasal congestion, ear pain, hearing loss, sinus pressure/congestion and sore throat.    Respiratory:  Negative for cough, chest tightness and shortness of breath.    Cardiovascular:  Negative for chest pain and palpitations.   Gastrointestinal:  Negative for abdominal pain and fecal incontinence.   Genitourinary:  Negative for bladder incontinence, difficulty urinating and erectile dysfunction.   Musculoskeletal:  Negative for arthralgias.   Integumentary:  Negative for rash.   Neurological:  Negative for dizziness and headaches.      Medical / Social / Family History     Past Medical History:   Diagnosis Date    Diabetes mellitus     Diabetes mellitus, type 2     Glaucoma     High cholesterol     Hypertension        Past Surgical History:   Procedure Laterality Date    CATARACT EXTRACTION      EYE SURGERY         Social History  Milton Johnson  reports that he has never smoked. He has been exposed to tobacco smoke. He has never used smokeless tobacco. He reports current alcohol use of about 1.0 standard drink per week. He reports that he does not use drugs.    Family History  Milton Johnson  family history is not on file. He was adopted.    Medications and Allergies     Medications  Outpatient  "Medications Marked as Taking for the 4/17/23 encounter (Office Visit) with Dontrell Shetty MD   Medication Sig Dispense Refill    amLODIPine (NORVASC) 10 MG tablet Take 1 tablet (10 mg total) by mouth once daily. 90 tablet 1    aspirin 81 MG Chew Take 81 mg by mouth. 2 times per week      bimatoprost (LUMIGAN) 0.03 % ophthalmic drops 1 drop every evening.      carvediloL (COREG) 3.125 MG tablet Take 3.125 mg by mouth 2 (two) times daily with meals.      dorzolamide (TRUSOPT) 2 % ophthalmic solution 1 drop 3 (three) times daily.      losartan-hydrochlorothiazide 100-25 mg (HYZAAR) 100-25 mg per tablet Take 1 tablet by mouth once daily. 90 tablet 1    pioglitazone (ACTOS) 15 MG tablet Take 1 tablet (15 mg total) by mouth once daily. 90 tablet 3    pravastatin (PRAVACHOL) 40 MG tablet Take 40 mg by mouth once daily.      [DISCONTINUED] insulin detemir U-100 (LEVEMIR FLEXTOUCH U-100 INSULN) 100 unit/mL (3 mL) InPn pen Inject 65 Units into the skin every evening. 58.5 mL 0    [DISCONTINUED] liraglutide 0.6 mg/0.1 mL, 18 mg/3 mL, subq PNIJ (VICTOZA 3-LORI) 0.6 mg/0.1 mL (18 mg/3 mL) PnIj pen Inject 1.8 mg into the skin once daily. 27 mL 0    [DISCONTINUED] metFORMIN (GLUCOPHAGE) 1000 MG tablet Take 1 tablet (1,000 mg total) by mouth 2 (two) times daily with meals. 180 tablet 0    [DISCONTINUED] pen needle, diabetic 31 gauge x 1/4" Ndle Inject 2 each into the skin 2 (two) times a day. 100 each 3       Allergies  Review of patient's allergies indicates:  No Known Allergies    Physical Examination     Vitals:    04/17/23 0832   BP: 136/80   Pulse: 66   Temp: 97.8 °F (36.6 °C)     Physical Exam  Constitutional:       General: He is not in acute distress.     Appearance: He is not ill-appearing.   HENT:      Head: Normocephalic and atraumatic.      Right Ear: Tympanic membrane and ear canal normal.      Left Ear: Tympanic membrane and ear canal normal.      Nose: Nose normal. No congestion or rhinorrhea. "   Eyes:      Pupils: Pupils are equal, round, and reactive to light.   Cardiovascular:      Rate and Rhythm: Normal rate and regular rhythm.      Pulses: Normal pulses.      Heart sounds: No murmur heard.  Pulmonary:      Effort: No respiratory distress.      Breath sounds: No wheezing, rhonchi or rales.   Abdominal:      General: Bowel sounds are normal.      Palpations: Abdomen is soft.      Tenderness: There is no abdominal tenderness.      Hernia: No hernia is present.   Musculoskeletal:      Cervical back: Normal range of motion and neck supple.   Lymphadenopathy:      Cervical: No cervical adenopathy.   Skin:     General: Skin is warm and dry.   Neurological:      Mental Status: He is alert.   Psychiatric:         Behavior: Behavior normal.         Thought Content: Thought content normal.        Assessment and Plan (including Health Maintenance)   :    Plan:         Health Maintenance Due   Topic Date Due    Hepatitis C Screening  Never done    Pneumococcal Vaccines (Age 65+) (1 - PCV) Never done    TETANUS VACCINE  Never done    Shingles Vaccine (1 of 2) Never done    Hemoglobin A1c  04/12/2023       Problem List Items Addressed This Visit          Cardiac/Vascular    Hypertension    Relevant Orders    CBC Auto Differential    Comprehensive Metabolic Panel       Endocrine    Type 2 diabetes mellitus without complication, with long-term current use of insulin - Primary    Relevant Medications    insulin detemir U-100, Levemir, (LEVEMIR FLEXTOUCH U-100 INSULN) 100 unit/mL (3 mL) InPn pen    liraglutide 0.6 mg/0.1 mL, 18 mg/3 mL, subq PNIJ (VICTOZA 3-LORI) 0.6 mg/0.1 mL (18 mg/3 mL) PnIj pen    metFORMIN (GLUCOPHAGE) 1000 MG tablet    Other Relevant Orders    CBC Auto Differential    Comprehensive Metabolic Panel    Hemoglobin A1C     Other Visit Diagnoses       Hemoglobin A1c greater than 9.0%        Relevant Orders    Hemoglobin A1C          Type 2 diabetes mellitus without complication, with long-term  "current use of insulin  -     CBC Auto Differential; Future; Expected date: 04/17/2023  -     Comprehensive Metabolic Panel; Future; Expected date: 04/17/2023  -     Hemoglobin A1C; Future; Expected date: 04/17/2023    Hypertension, unspecified type  -     CBC Auto Differential; Future; Expected date: 04/17/2023  -     Comprehensive Metabolic Panel; Future; Expected date: 04/17/2023    Hemoglobin A1c greater than 9.0%  -     Hemoglobin A1C; Future; Expected date: 04/17/2023    Other orders  -     insulin detemir U-100, Levemir, (LEVEMIR FLEXTOUCH U-100 INSULN) 100 unit/mL (3 mL) InPn pen; Inject 65 Units into the skin every evening.  Dispense: 58.5 mL; Refill: 0  -     liraglutide 0.6 mg/0.1 mL, 18 mg/3 mL, subq PNIJ (VICTOZA 3-LORI) 0.6 mg/0.1 mL (18 mg/3 mL) PnIj pen; Inject 1.8 mg into the skin once daily.  Dispense: 27 mL; Refill: 0  -     metFORMIN (GLUCOPHAGE) 1000 MG tablet; Take 1 tablet (1,000 mg total) by mouth 2 (two) times daily with meals.  Dispense: 180 tablet; Refill: 0  -     pen needle, diabetic 31 gauge x 1/4" Ndle; Inject 2 each into the skin 2 (two) times a day.  Dispense: 100 each; Refill: 3       Health Maintenance Topics with due status: Not Due       Topic Last Completion Date    Diabetes Urine Screening 10/11/2022    Lipid Panel 01/12/2023    Eye Exam 01/13/2023       Procedures     Future Appointments   Date Time Provider Department Center   1/23/2024  1:00 PM SANDRA Stubbs [facility] ZAYNA[facility] [place]        No follow-ups on file.       Signature:  Dontrell Shetty MD  [facility]  [address] [facility]   [place] [address]  [phone] Phone  [phone] Fax    Date of encounter: 4/17/23    "

## 2023-04-17 NOTE — PROGRESS NOTES
Dontrell Shetty MD   [facility]  [address] [place] [address]     PATIENT NAME: Milton Johnson  : 1945  DATE: 23  MRN: [id number]      Billing Provider: Dontrell Shetty MD  Level of Service: [place] OFFICE/OUTPT VISIT, EST, LEVL IV, 30-39 MIN  Patient PCP Information       Provider PCP Type    Dontrell Shetty MD General            Reason for Visit / Chief Complaint: Diabetes (Check up, lab, refills )         History of Present Illness / Problem Focused Workflow     Milton Johnson presents to the clinic with Diabetes (Check up, lab, refills )     HPI    Review of Systems     Review of Systems   Constitutional:  Negative for activity change, appetite change, fatigue and fever.   HENT:  Negative for nasal congestion, ear pain, hearing loss, rhinorrhea, sinus pressure/congestion and sore throat.    Respiratory:  Negative for cough, chest tightness and shortness of breath.    Cardiovascular:  Negative for chest pain and palpitations.   Gastrointestinal:  Negative for abdominal pain and fecal incontinence.   Genitourinary:  Negative for bladder incontinence, difficulty urinating and erectile dysfunction.   Musculoskeletal:  Negative for arthralgias.   Integumentary:  Negative for rash.   Neurological:  Negative for dizziness and headaches.      Medical / Social / Family History     Past Medical History:   Diagnosis Date    Diabetes mellitus     Diabetes mellitus, type 2     Glaucoma     High cholesterol     Hypertension        Past Surgical History:   Procedure Laterality Date    CATARACT EXTRACTION      EYE SURGERY         Social History  Milton Johnson  reports that he has never smoked. He has been exposed to tobacco smoke. He has never used smokeless tobacco. He reports current alcohol use of about 1.0 standard drink per week. He reports that he does not use drugs.    Family History  Milton Johnson  family history is not on file. He was adopted.    Medications  "and Allergies     Medications  Outpatient Medications Marked as Taking for the 4/17/23 encounter (Office Visit) with Dontrell Shetty MD   Medication Sig Dispense Refill    amLODIPine (NORVASC) 10 MG tablet Take 1 tablet (10 mg total) by mouth once daily. 90 tablet 1    aspirin 81 MG Chew Take 81 mg by mouth. 2 times per week      bimatoprost (LUMIGAN) 0.03 % ophthalmic drops 1 drop every evening.      carvediloL (COREG) 3.125 MG tablet Take 3.125 mg by mouth 2 (two) times daily with meals.      dorzolamide (TRUSOPT) 2 % ophthalmic solution 1 drop 3 (three) times daily.      losartan-hydrochlorothiazide 100-25 mg (HYZAAR) 100-25 mg per tablet Take 1 tablet by mouth once daily. 90 tablet 1    pioglitazone (ACTOS) 15 MG tablet Take 1 tablet (15 mg total) by mouth once daily. 90 tablet 3    pravastatin (PRAVACHOL) 40 MG tablet Take 40 mg by mouth once daily.      [DISCONTINUED] insulin detemir U-100 (LEVEMIR FLEXTOUCH U-100 INSULN) 100 unit/mL (3 mL) InPn pen Inject 65 Units into the skin every evening. 58.5 mL 0    [DISCONTINUED] liraglutide 0.6 mg/0.1 mL, 18 mg/3 mL, subq PNIJ (VICTOZA 3-LORI) 0.6 mg/0.1 mL (18 mg/3 mL) PnIj pen Inject 1.8 mg into the skin once daily. 27 mL 0    [DISCONTINUED] metFORMIN (GLUCOPHAGE) 1000 MG tablet Take 1 tablet (1,000 mg total) by mouth 2 (two) times daily with meals. 180 tablet 0    [DISCONTINUED] pen needle, diabetic 31 gauge x 1/4" Ndle Inject 2 each into the skin 2 (two) times a day. 100 each 3       Allergies  Review of patient's allergies indicates:  No Known Allergies    Physical Examination     Vitals:    04/17/23 0832   BP: 136/80   Pulse: 66   Temp: 97.8 °F (36.6 °C)     Physical Exam  Constitutional:       General: He is not in acute distress.     Appearance: He is not ill-appearing.   HENT:      Head: Normocephalic and atraumatic.      Right Ear: Tympanic membrane and ear canal normal.      Left Ear: Tympanic membrane and ear canal normal.      Nose: Nose normal. No " congestion or rhinorrhea.   Eyes:      Pupils: Pupils are equal, round, and reactive to light.   Cardiovascular:      Rate and Rhythm: Normal rate and regular rhythm.      Pulses: Normal pulses.      Heart sounds: No murmur heard.  Pulmonary:      Effort: No respiratory distress.      Breath sounds: No wheezing, rhonchi or rales.   Abdominal:      General: Bowel sounds are normal.      Palpations: Abdomen is soft.      Tenderness: There is no abdominal tenderness.      Hernia: No hernia is present.   Musculoskeletal:      Cervical back: Normal range of motion and neck supple.   Lymphadenopathy:      Cervical: No cervical adenopathy.   Skin:     General: Skin is warm and dry.   Neurological:      Mental Status: He is alert.   Psychiatric:         Behavior: Behavior normal.         Thought Content: Thought content normal.        Assessment and Plan (including Health Maintenance)   :    Plan:         Health Maintenance Due   Topic Date Due    Hepatitis C Screening  Never done    Pneumococcal Vaccines (Age 65+) (1 - PCV) Never done    TETANUS VACCINE  Never done    Shingles Vaccine (1 of 2) Never done    Hemoglobin A1c  04/12/2023       Problem List Items Addressed This Visit          Cardiac/Vascular    Hypertension    Relevant Orders    CBC Auto Differential    Comprehensive Metabolic Panel       Endocrine    Type 2 diabetes mellitus without complication, with long-term current use of insulin - Primary    Relevant Medications    insulin detemir U-100, Levemir, (LEVEMIR FLEXTOUCH U-100 INSULN) 100 unit/mL (3 mL) InPn pen    liraglutide 0.6 mg/0.1 mL, 18 mg/3 mL, subq PNIJ (VICTOZA 3-LORI) 0.6 mg/0.1 mL (18 mg/3 mL) PnIj pen    metFORMIN (GLUCOPHAGE) 1000 MG tablet    Other Relevant Orders    CBC Auto Differential    Comprehensive Metabolic Panel    Hemoglobin A1C     Other Visit Diagnoses       Hemoglobin A1c greater than 9.0%        Relevant Orders    Hemoglobin A1C          Type 2 diabetes mellitus without  "complication, with long-term current use of insulin  -     CBC Auto Differential; Future; Expected date: 04/17/2023  -     Comprehensive Metabolic Panel; Future; Expected date: 04/17/2023  -     Hemoglobin A1C; Future; Expected date: 04/17/2023    Hypertension, unspecified type  -     CBC Auto Differential; Future; Expected date: 04/17/2023  -     Comprehensive Metabolic Panel; Future; Expected date: 04/17/2023    Hemoglobin A1c greater than 9.0%  -     Hemoglobin A1C; Future; Expected date: 04/17/2023    Other orders  -     insulin detemir U-100, Levemir, (LEVEMIR FLEXTOUCH U-100 INSULN) 100 unit/mL (3 mL) InPn pen; Inject 65 Units into the skin every evening.  Dispense: 58.5 mL; Refill: 0  -     liraglutide 0.6 mg/0.1 mL, 18 mg/3 mL, subq PNIJ (VICTOZA 3-LORI) 0.6 mg/0.1 mL (18 mg/3 mL) PnIj pen; Inject 1.8 mg into the skin once daily.  Dispense: 27 mL; Refill: 0  -     metFORMIN (GLUCOPHAGE) 1000 MG tablet; Take 1 tablet (1,000 mg total) by mouth 2 (two) times daily with meals.  Dispense: 180 tablet; Refill: 0  -     pen needle, diabetic 31 gauge x 1/4" Ndle; Inject 2 each into the skin 2 (two) times a day.  Dispense: 100 each; Refill: 3       Health Maintenance Topics with due status: Not Due       Topic Last Completion Date    Diabetes Urine Screening 10/11/2022    Lipid Panel 01/12/2023    Eye Exam 01/13/2023       Procedures     Future Appointments   Date Time Provider Department Center   7/18/2023  7:40 AM Dontrell Shetty MD [facility] [place]   [facility]/23/2024  [facility]        No follow-ups on file.       Signature:  Dontrell Shetty MD  [facility]  [address] [place] [address]  [phone] Phone  [phone] Fax    Date of encounter: 4/17/23    "

## 2023-05-24 ENCOUNTER — PATIENT OUTREACH (OUTPATIENT)
Dept: ADMINISTRATIVE | Facility: HOSPITAL | Age: 78
End: 2023-05-24

## 2023-05-24 NOTE — PROGRESS NOTES
Health Maintenance Due   Topic Date Due    Hepatitis C Screening  Never done    Pneumococcal Vaccines (Age 65+) (1 - PCV) Never done    TETANUS VACCINE  Never done    Shingles Vaccine (1 of 2) Never done     05/24/2023   Care Everywhere updates requested and reviewed.  Media reports reviewed.  [facility] and [facility] reviewed.  [employer] reviewed.  [facility] abstracted  Next appointment 07/18/2023 . Appointment notes updated to include: Pt is due for DM Urine and Hep C screen

## 2023-07-18 ENCOUNTER — OFFICE VISIT (OUTPATIENT)
Dept: FAMILY MEDICINE | Facility: CLINIC | Age: 78
End: 2023-07-18
Payer: MEDICARE

## 2023-07-18 VITALS
HEIGHT: 73 IN | DIASTOLIC BLOOD PRESSURE: 68 MMHG | TEMPERATURE: 98 F | BODY MASS INDEX: 30.8 KG/M2 | WEIGHT: 232.38 LBS | SYSTOLIC BLOOD PRESSURE: 130 MMHG | OXYGEN SATURATION: 97 % | HEART RATE: 69 BPM

## 2023-07-18 DIAGNOSIS — E78.5 HYPERLIPIDEMIA, UNSPECIFIED HYPERLIPIDEMIA TYPE: Primary | ICD-10-CM

## 2023-07-18 DIAGNOSIS — Z79.4 TYPE 2 DIABETES MELLITUS WITHOUT COMPLICATION, WITH LONG-TERM CURRENT USE OF INSULIN: ICD-10-CM

## 2023-07-18 DIAGNOSIS — I10 HYPERTENSION, UNSPECIFIED TYPE: ICD-10-CM

## 2023-07-18 DIAGNOSIS — E11.9 TYPE 2 DIABETES MELLITUS WITHOUT COMPLICATION, WITH LONG-TERM CURRENT USE OF INSULIN: ICD-10-CM

## 2023-07-18 DIAGNOSIS — R73.9 HEMOGLOBIN A1C BETWEEN 7.0% AND 9.0%: ICD-10-CM

## 2023-07-18 LAB
ANION GAP SERPL CALCULATED.3IONS-SCNC: 10 MMOL/L (ref 7–16)
BASOPHILS # BLD AUTO: 0.11 K/UL (ref 0–0.2)
BASOPHILS NFR BLD AUTO: 1.1 % (ref 0–1)
BUN SERPL-MCNC: 18 MG/DL (ref 7–18)
BUN/CREAT SERPL: 15 (ref 6–20)
CALCIUM SERPL-MCNC: 9.5 MG/DL (ref 8.5–10.1)
CHLORIDE SERPL-SCNC: 102 MMOL/L (ref 98–107)
CHOLEST SERPL-MCNC: 102 MG/DL (ref 0–200)
CHOLEST/HDLC SERPL: 2.2 {RATIO}
CO2 SERPL-SCNC: 26 MMOL/L (ref 21–32)
CREAT SERPL-MCNC: 1.17 MG/DL (ref 0.7–1.3)
DIFFERENTIAL METHOD BLD: ABNORMAL
EGFR (NO RACE VARIABLE) (RUSH/TITUS): 65 ML/MIN/1.73M2
EOSINOPHIL # BLD AUTO: 0.15 K/UL (ref 0–0.5)
EOSINOPHIL NFR BLD AUTO: 1.5 % (ref 1–4)
ERYTHROCYTE [DISTWIDTH] IN BLOOD BY AUTOMATED COUNT: 12.8 % (ref 11.5–14.5)
EST. AVERAGE GLUCOSE BLD GHB EST-MCNC: 177 MG/DL
GLUCOSE SERPL-MCNC: 106 MG/DL (ref 74–106)
HBA1C MFR BLD HPLC: 7.9 % (ref 4.5–6.6)
HCT VFR BLD AUTO: 46.6 % (ref 40–54)
HDLC SERPL-MCNC: 46 MG/DL (ref 40–60)
HGB BLD-MCNC: 15.5 G/DL (ref 13.5–18)
IMM GRANULOCYTES # BLD AUTO: 0.03 K/UL (ref 0–0.04)
IMM GRANULOCYTES NFR BLD: 0.3 % (ref 0–0.4)
LDLC SERPL CALC-MCNC: 37 MG/DL
LYMPHOCYTES # BLD AUTO: 2.1 K/UL (ref 1–4.8)
LYMPHOCYTES NFR BLD AUTO: 20.4 % (ref 27–41)
MCH RBC QN AUTO: 28.1 PG (ref 27–31)
MCHC RBC AUTO-ENTMCNC: 33.3 G/DL (ref 32–36)
MCV RBC AUTO: 84.6 FL (ref 80–96)
MONOCYTES # BLD AUTO: 0.72 K/UL (ref 0–0.8)
MONOCYTES NFR BLD AUTO: 7 % (ref 2–6)
MPC BLD CALC-MCNC: 9.6 FL (ref 9.4–12.4)
NEUTROPHILS # BLD AUTO: 7.17 K/UL (ref 1.8–7.7)
NEUTROPHILS NFR BLD AUTO: 69.7 % (ref 53–65)
NONHDLC SERPL-MCNC: 56 MG/DL
NRBC # BLD AUTO: 0 X10E3/UL
NRBC, AUTO (.00): 0 %
PLATELET # BLD AUTO: 273 K/UL (ref 150–400)
POTASSIUM SERPL-SCNC: 4.1 MMOL/L (ref 3.5–5.1)
RBC # BLD AUTO: 5.51 M/UL (ref 4.6–6.2)
SODIUM SERPL-SCNC: 134 MMOL/L (ref 136–145)
TRIGL SERPL-MCNC: 93 MG/DL (ref 35–150)
VLDLC SERPL-MCNC: 19 MG/DL
WBC # BLD AUTO: 10.28 K/UL (ref 4.5–11)

## 2023-07-18 PROCEDURE — 1101F PT FALLS ASSESS-DOCD LE1/YR: CPT | Mod: ,,, | Performed by: FAMILY MEDICINE

## 2023-07-18 PROCEDURE — 80061 LIPID PANEL: ICD-10-PCS | Mod: ,,, | Performed by: CLINICAL MEDICAL LABORATORY

## 2023-07-18 PROCEDURE — 83036 HEMOGLOBIN GLYCOSYLATED A1C: CPT | Mod: ,,, | Performed by: CLINICAL MEDICAL LABORATORY

## 2023-07-18 PROCEDURE — 3075F SYST BP GE 130 - 139MM HG: CPT | Mod: ,,, | Performed by: FAMILY MEDICINE

## 2023-07-18 PROCEDURE — 1159F MED LIST DOCD IN RCRD: CPT | Mod: ,,, | Performed by: FAMILY MEDICINE

## 2023-07-18 PROCEDURE — 80048 BASIC METABOLIC PNL TOTAL CA: CPT | Mod: ,,, | Performed by: CLINICAL MEDICAL LABORATORY

## 2023-07-18 PROCEDURE — 83036 HEMOGLOBIN A1C: ICD-10-PCS | Mod: ,,, | Performed by: CLINICAL MEDICAL LABORATORY

## 2023-07-18 PROCEDURE — 3288F PR FALLS RISK ASSESSMENT DOCUMENTED: ICD-10-PCS | Mod: ,,, | Performed by: FAMILY MEDICINE

## 2023-07-18 PROCEDURE — 80061 LIPID PANEL: CPT | Mod: ,,, | Performed by: CLINICAL MEDICAL LABORATORY

## 2023-07-18 PROCEDURE — 3078F PR MOST RECENT DIASTOLIC BLOOD PRESSURE < 80 MM HG: ICD-10-PCS | Mod: ,,, | Performed by: FAMILY MEDICINE

## 2023-07-18 PROCEDURE — 99214 OFFICE O/P EST MOD 30 MIN: CPT | Mod: ,,, | Performed by: FAMILY MEDICINE

## 2023-07-18 PROCEDURE — 3288F FALL RISK ASSESSMENT DOCD: CPT | Mod: ,,, | Performed by: FAMILY MEDICINE

## 2023-07-18 PROCEDURE — 1159F PR MEDICATION LIST DOCUMENTED IN MEDICAL RECORD: ICD-10-PCS | Mod: ,,, | Performed by: FAMILY MEDICINE

## 2023-07-18 PROCEDURE — 99214 PR OFFICE/OUTPT VISIT, EST, LEVL IV, 30-39 MIN: ICD-10-PCS | Mod: ,,, | Performed by: FAMILY MEDICINE

## 2023-07-18 PROCEDURE — 85025 CBC WITH DIFFERENTIAL: ICD-10-PCS | Mod: ,,, | Performed by: CLINICAL MEDICAL LABORATORY

## 2023-07-18 PROCEDURE — 80048 BASIC METABOLIC PANEL: ICD-10-PCS | Mod: ,,, | Performed by: CLINICAL MEDICAL LABORATORY

## 2023-07-18 PROCEDURE — 1101F PR PT FALLS ASSESS DOC 0-1 FALLS W/OUT INJ PAST YR: ICD-10-PCS | Mod: ,,, | Performed by: FAMILY MEDICINE

## 2023-07-18 PROCEDURE — 3078F DIAST BP <80 MM HG: CPT | Mod: ,,, | Performed by: FAMILY MEDICINE

## 2023-07-18 PROCEDURE — 85025 COMPLETE CBC W/AUTO DIFF WBC: CPT | Mod: ,,, | Performed by: CLINICAL MEDICAL LABORATORY

## 2023-07-18 PROCEDURE — 3075F PR MOST RECENT SYSTOLIC BLOOD PRESS GE 130-139MM HG: ICD-10-PCS | Mod: ,,, | Performed by: FAMILY MEDICINE

## 2023-07-18 RX ORDER — PIOGLITAZONEHYDROCHLORIDE 15 MG/1
15 TABLET ORAL DAILY
Qty: 90 TABLET | Refills: 0 | Status: SHIPPED | OUTPATIENT
Start: 2023-07-18 | End: 2023-10-19 | Stop reason: SDUPTHER

## 2023-07-18 RX ORDER — METFORMIN HYDROCHLORIDE 1000 MG/1
1000 TABLET ORAL 2 TIMES DAILY WITH MEALS
Qty: 180 TABLET | Refills: 0 | Status: SHIPPED | OUTPATIENT
Start: 2023-07-18 | End: 2023-10-19 | Stop reason: SDUPTHER

## 2023-07-18 RX ORDER — LOSARTAN POTASSIUM AND HYDROCHLOROTHIAZIDE 25; 100 MG/1; MG/1
1 TABLET ORAL DAILY
Qty: 90 TABLET | Refills: 0 | Status: SHIPPED | OUTPATIENT
Start: 2023-07-18 | End: 2023-10-19 | Stop reason: SDUPTHER

## 2023-07-18 RX ORDER — AMLODIPINE BESYLATE 10 MG/1
10 TABLET ORAL DAILY
Qty: 90 TABLET | Refills: 0 | Status: SHIPPED | OUTPATIENT
Start: 2023-07-18 | End: 2023-10-19 | Stop reason: SDUPTHER

## 2023-07-18 RX ORDER — INSULIN DETEMIR 100 [IU]/ML
65 INJECTION, SOLUTION SUBCUTANEOUS NIGHTLY
Qty: 58.5 ML | Refills: 0 | Status: SHIPPED | OUTPATIENT
Start: 2023-07-18 | End: 2023-10-19 | Stop reason: SDUPTHER

## 2023-07-18 RX ORDER — ROSUVASTATIN CALCIUM 40 MG/1
40 TABLET, COATED ORAL NIGHTLY
COMMUNITY
Start: 2023-07-01

## 2023-07-18 RX ORDER — LIRAGLUTIDE 6 MG/ML
1.8 INJECTION SUBCUTANEOUS DAILY
Qty: 27 ML | Refills: 0 | Status: SHIPPED | OUTPATIENT
Start: 2023-07-18 | End: 2023-10-19 | Stop reason: SDUPTHER

## 2023-07-18 NOTE — PROGRESS NOTES
Dontrell Shetty MD   [facility]  [address] [place] [address]     PATIENT NAME: Milton Johnson  : 1945  DATE: 23  MRN: [id number]      Billing Provider: Dontrell Shetty MD  Level of Service: [place] OFFICE/OUTPT VISIT, EST, LEVL IV, 30-39 MIN  Patient PCP Information       Provider PCP Type    Dontrell Shetty MD General            Reason for Visit / Chief Complaint: Diabetes (3 month check up/lab and med refills), Hypertension, and Hyperlipidemia         History of Present Illness / Problem Focused Workflow     Milton Johnson presents to the clinic with Diabetes (3 month check up/lab and med refills), Hypertension, and Hyperlipidemia     HPI    Review of Systems     Review of Systems   Constitutional:  Negative for activity change, appetite change, fatigue and fever.   HENT:  Negative for nasal congestion, ear pain, hearing loss, sinus pressure/congestion and sore throat.    Respiratory:  Negative for cough, chest tightness and shortness of breath.    Cardiovascular:  Negative for chest pain and palpitations.   Gastrointestinal:  Negative for abdominal pain and fecal incontinence.   Genitourinary:  Negative for bladder incontinence, difficulty urinating and erectile dysfunction.   Musculoskeletal:  Negative for arthralgias.   Integumentary:  Negative for rash.   Neurological:  Negative for dizziness and headaches.      Medical / Social / Family History     Past Medical History:   Diagnosis Date    Diabetes mellitus     Diabetes mellitus, type 2     Glaucoma     High cholesterol     Hypertension        Past Surgical History:   Procedure Laterality Date    CATARACT EXTRACTION      EYE SURGERY         Social History  Milton Johnson  reports that he has never smoked. He has been exposed to tobacco smoke. He has never used smokeless tobacco. He reports current alcohol use of about 1.0 standard drink per week. He reports that he does not use drugs.    Family  "History  Milton Alex  family history is not on file. He was adopted.    Medications and Allergies     Medications  Outpatient Medications Marked as Taking for the 7/18/23 encounter (Office Visit) with Dontrell Shetty MD   Medication Sig Dispense Refill    aspirin 81 MG Chew Take 81 mg by mouth. 2 times per week      bimatoprost (LUMIGAN) 0.03 % ophthalmic drops 1 drop every evening.      carvediloL (COREG) 3.125 MG tablet Take 3.125 mg by mouth 2 (two) times daily with meals.      dorzolamide (TRUSOPT) 2 % ophthalmic solution 1 drop 3 (three) times daily.      pen needle, diabetic 31 gauge x 1/4" Ndle Inject 2 each into the skin 2 (two) times a day. 100 each 3    rosuvastatin (CRESTOR) 40 MG Tab Take 40 mg by mouth every evening.      [DISCONTINUED] amLODIPine (NORVASC) 10 MG tablet Take 1 tablet (10 mg total) by mouth once daily. 90 tablet 1    [DISCONTINUED] insulin detemir U-100, Levemir, (LEVEMIR FLEXTOUCH U-100 INSULN) 100 unit/mL (3 mL) InPn pen Inject 65 Units into the skin every evening. 58.5 mL 0    [DISCONTINUED] liraglutide 0.6 mg/0.1 mL, 18 mg/3 mL, subq PNIJ (VICTOZA 3-LORI) 0.6 mg/0.1 mL (18 mg/3 mL) PnIj pen Inject 1.8 mg into the skin once daily. 27 mL 0    [DISCONTINUED] losartan-hydrochlorothiazide 100-25 mg (HYZAAR) 100-25 mg per tablet Take 1 tablet by mouth once daily. 90 tablet 1    [DISCONTINUED] metFORMIN (GLUCOPHAGE) 1000 MG tablet Take 1 tablet (1,000 mg total) by mouth 2 (two) times daily with meals. 180 tablet 0    [DISCONTINUED] pioglitazone (ACTOS) 15 MG tablet Take 1 tablet (15 mg total) by mouth once daily. 90 tablet 3       Allergies  Review of patient's allergies indicates:  No Known Allergies    Physical Examination     Vitals:    07/18/23 0816   BP: 130/68   Pulse: 69   Temp: 98 °F (36.7 °C)     Physical Exam  Constitutional:       General: He is not in acute distress.     Appearance: He is not ill-appearing.   HENT:      Head: Normocephalic and atraumatic.      Right Ear: " Tympanic membrane and ear canal normal.      Left Ear: Tympanic membrane and ear canal normal.      Nose: Nose normal. No congestion or rhinorrhea.   Eyes:      Pupils: Pupils are equal, round, and reactive to light.   Cardiovascular:      Rate and Rhythm: Normal rate and regular rhythm.      Pulses: Normal pulses.      Heart sounds: No murmur heard.  Pulmonary:      Effort: No respiratory distress.      Breath sounds: No wheezing, rhonchi or rales.   Abdominal:      General: Bowel sounds are normal.      Palpations: Abdomen is soft.      Tenderness: There is no abdominal tenderness.      Hernia: No hernia is present.   Musculoskeletal:      Cervical back: Normal range of motion and neck supple.   Lymphadenopathy:      Cervical: No cervical adenopathy.   Skin:     General: Skin is warm and dry.   Neurological:      Mental Status: He is alert.   Psychiatric:         Behavior: Behavior normal.         Thought Content: Thought content normal.        Assessment and Plan (including Health Maintenance)   :    Plan:         Health Maintenance Due   Topic Date Due    Hepatitis C Screening  Never done    Pneumococcal Vaccines (Age 65+) (1 - PCV) Never done    TETANUS VACCINE  Never done    Shingles Vaccine (1 of 2) Never done    COVID-19 Vaccine (5 - Moderna series) 03/15/2023       Problem List Items Addressed This Visit          Cardiac/Vascular    Hyperlipidemia - Primary    Relevant Orders    Lipid Panel (Completed)    Hypertension    Relevant Orders    Basic Metabolic Panel (Completed)    CBC Auto Differential (Completed)       Endocrine    Type 2 diabetes mellitus without complication, with long-term current use of insulin    Relevant Medications    insulin detemir U-100, Levemir, (LEVEMIR FLEXTOUCH U100 INSULIN) 100 unit/mL (3 mL) InPn pen    liraglutide 0.6 mg/0.1 mL, 18 mg/3 mL, subq PNIJ (VICTOZA 3-LORI) 0.6 mg/0.1 mL (18 mg/3 mL) PnIj pen    metFORMIN (GLUCOPHAGE) 1000 MG tablet    pioglitazone (ACTOS) 15 MG tablet     Other Relevant Orders    Hemoglobin A1C (Completed)     Other Visit Diagnoses       Hemoglobin A1c between 7.0% and 9.0%        Relevant Orders    Hemoglobin A1C (Completed)          Hyperlipidemia, unspecified hyperlipidemia type  -     Lipid Panel; Future; Expected date: 07/18/2023    Hypertension, unspecified type  -     Basic Metabolic Panel; Future; Expected date: 07/18/2023  -     CBC Auto Differential; Future; Expected date: 07/18/2023    Type 2 diabetes mellitus without complication, with long-term current use of insulin  -     Hemoglobin A1C; Future; Expected date: 07/18/2023    Hemoglobin A1c between 7.0% and 9.0%  -     Hemoglobin A1C; Future; Expected date: 07/18/2023    Other orders  -     amLODIPine (NORVASC) 10 MG tablet; Take 1 tablet (10 mg total) by mouth once daily.  Dispense: 90 tablet; Refill: 0  -     insulin detemir U-100, Levemir, (LEVEMIR FLEXTOUCH U100 INSULIN) 100 unit/mL (3 mL) InPn pen; Inject 65 Units into the skin every evening.  Dispense: 58.5 mL; Refill: 0  -     liraglutide 0.6 mg/0.1 mL, 18 mg/3 mL, subq PNIJ (VICTOZA 3-LORI) 0.6 mg/0.1 mL (18 mg/3 mL) PnIj pen; Inject 1.8 mg into the skin once daily.  Dispense: 27 mL; Refill: 0  -     losartan-hydrochlorothiazide 100-25 mg (HYZAAR) 100-25 mg per tablet; Take 1 tablet by mouth once daily.  Dispense: 90 tablet; Refill: 0  -     metFORMIN (GLUCOPHAGE) 1000 MG tablet; Take 1 tablet (1,000 mg total) by mouth 2 (two) times daily with meals.  Dispense: 180 tablet; Refill: 0  -     pioglitazone (ACTOS) 15 MG tablet; Take 1 tablet (15 mg total) by mouth once daily.  Dispense: 90 tablet; Refill: 0       Health Maintenance Topics with due status: Not Due       Topic Last Completion Date    Influenza Vaccine 10/03/2022    Diabetes Urine Screening 10/11/2022    Eye Exam 01/13/2023    Lipid Panel 07/18/2023    Hemoglobin A1c 07/18/2023       Procedures     Future Appointments   Date Time Provider Department Center   10/19/2023  8:00 AM Dontrell  ELAINA Shetty MD [facility] [place]   1/23/2024  1:00 PM [facility]        No follow-ups on file.       Signature:  Dontrell Shetty MD  [facility]  [address] [place] [address]  [phone] Phone  [phone] Fax    Date of encounter: 7/18/23

## 2023-08-30 ENCOUNTER — PATIENT OUTREACH (OUTPATIENT)
Dept: ADMINISTRATIVE | Facility: HOSPITAL | Age: 78
End: 2023-08-30

## 2023-08-30 NOTE — PROGRESS NOTES
08/30/2023   --Chart accessed for: [employer] REPORT  --Care Gaps addressed: COLONOSCOPY, ALL TOPICS  Outreach made to patient via TELEPHONE--LEFT MESSAGE  Care Everywhere updates requested and reviewed.  Media reports reviewed.  [facility] and [facility] reviewed.  Immunization Database ([employer]/MIXX) reviewed. Vaccinations uploaded:NONE  [facility] abstracted.

## 2023-09-09 DIAGNOSIS — Z71.89 COMPLEX CARE COORDINATION: ICD-10-CM

## 2023-10-19 ENCOUNTER — OFFICE VISIT (OUTPATIENT)
Dept: FAMILY MEDICINE | Facility: CLINIC | Age: 78
End: 2023-10-19
Payer: MEDICARE

## 2023-10-19 VITALS
OXYGEN SATURATION: 96 % | WEIGHT: 237 LBS | BODY MASS INDEX: 31.41 KG/M2 | HEART RATE: 88 BPM | TEMPERATURE: 98 F | HEIGHT: 73 IN | SYSTOLIC BLOOD PRESSURE: 128 MMHG | DIASTOLIC BLOOD PRESSURE: 70 MMHG

## 2023-10-19 DIAGNOSIS — I10 HYPERTENSION, UNSPECIFIED TYPE: ICD-10-CM

## 2023-10-19 DIAGNOSIS — E78.5 HYPERLIPIDEMIA, UNSPECIFIED HYPERLIPIDEMIA TYPE: ICD-10-CM

## 2023-10-19 DIAGNOSIS — Z79.4 TYPE 2 DIABETES MELLITUS WITHOUT COMPLICATION, WITH LONG-TERM CURRENT USE OF INSULIN: ICD-10-CM

## 2023-10-19 DIAGNOSIS — E11.9 TYPE 2 DIABETES MELLITUS WITHOUT COMPLICATION, WITH LONG-TERM CURRENT USE OF INSULIN: ICD-10-CM

## 2023-10-19 DIAGNOSIS — Z23 NEED FOR IMMUNIZATION AGAINST INFLUENZA: ICD-10-CM

## 2023-10-19 DIAGNOSIS — R73.9 HEMOGLOBIN A1C BETWEEN 7.0% AND 9.0%: Primary | ICD-10-CM

## 2023-10-19 LAB
ANION GAP SERPL CALCULATED.3IONS-SCNC: 10 MMOL/L (ref 7–16)
BASOPHILS # BLD AUTO: 0.09 K/UL (ref 0–0.2)
BASOPHILS NFR BLD AUTO: 1 % (ref 0–1)
BUN SERPL-MCNC: 19 MG/DL (ref 7–18)
BUN/CREAT SERPL: 14 (ref 6–20)
CALCIUM SERPL-MCNC: 9.5 MG/DL (ref 8.5–10.1)
CHLORIDE SERPL-SCNC: 98 MMOL/L (ref 98–107)
CO2 SERPL-SCNC: 29 MMOL/L (ref 21–32)
CREAT SERPL-MCNC: 1.37 MG/DL (ref 0.7–1.3)
CREAT UR-MCNC: 111 MG/DL (ref 39–259)
DIFFERENTIAL METHOD BLD: ABNORMAL
EGFR (NO RACE VARIABLE) (RUSH/TITUS): 53 ML/MIN/1.73M2
EOSINOPHIL # BLD AUTO: 0.15 K/UL (ref 0–0.5)
EOSINOPHIL NFR BLD AUTO: 1.6 % (ref 1–4)
ERYTHROCYTE [DISTWIDTH] IN BLOOD BY AUTOMATED COUNT: 12.6 % (ref 11.5–14.5)
EST. AVERAGE GLUCOSE BLD GHB EST-MCNC: 214 MG/DL
GLUCOSE SERPL-MCNC: 111 MG/DL (ref 74–106)
HBA1C MFR BLD HPLC: 9 % (ref 4.5–6.6)
HCT VFR BLD AUTO: 44.7 % (ref 40–54)
HGB BLD-MCNC: 15.6 G/DL (ref 13.5–18)
IMM GRANULOCYTES # BLD AUTO: 0.03 K/UL (ref 0–0.04)
IMM GRANULOCYTES NFR BLD: 0.3 % (ref 0–0.4)
LYMPHOCYTES # BLD AUTO: 1.88 K/UL (ref 1–4.8)
LYMPHOCYTES NFR BLD AUTO: 20.2 % (ref 27–41)
MCH RBC QN AUTO: 29.5 PG (ref 27–31)
MCHC RBC AUTO-ENTMCNC: 34.9 G/DL (ref 32–36)
MCV RBC AUTO: 84.5 FL (ref 80–96)
MICROALBUMIN UR-MCNC: 11.5 MG/DL (ref 0–2.8)
MICROALBUMIN/CREAT RATIO PNL UR: 103.6 MG/G (ref 0–30)
MONOCYTES # BLD AUTO: 0.74 K/UL (ref 0–0.8)
MONOCYTES NFR BLD AUTO: 7.9 % (ref 2–6)
MPC BLD CALC-MCNC: 9.4 FL (ref 9.4–12.4)
NEUTROPHILS # BLD AUTO: 6.44 K/UL (ref 1.8–7.7)
NEUTROPHILS NFR BLD AUTO: 69 % (ref 53–65)
NRBC # BLD AUTO: 0 X10E3/UL
NRBC, AUTO (.00): 0 %
PLATELET # BLD AUTO: 285 K/UL (ref 150–400)
POTASSIUM SERPL-SCNC: 4.1 MMOL/L (ref 3.5–5.1)
RBC # BLD AUTO: 5.29 M/UL (ref 4.6–6.2)
SODIUM SERPL-SCNC: 133 MMOL/L (ref 136–145)
WBC # BLD AUTO: 9.33 K/UL (ref 4.5–11)

## 2023-10-19 PROCEDURE — 3288F PR FALLS RISK ASSESSMENT DOCUMENTED: ICD-10-PCS | Mod: ,,, | Performed by: FAMILY MEDICINE

## 2023-10-19 PROCEDURE — 99214 PR OFFICE/OUTPT VISIT, EST, LEVL IV, 30-39 MIN: ICD-10-PCS | Mod: ,,, | Performed by: FAMILY MEDICINE

## 2023-10-19 PROCEDURE — 82570 ASSAY OF URINE CREATININE: CPT | Mod: ,,, | Performed by: CLINICAL MEDICAL LABORATORY

## 2023-10-19 PROCEDURE — 3078F DIAST BP <80 MM HG: CPT | Mod: ,,, | Performed by: FAMILY MEDICINE

## 2023-10-19 PROCEDURE — 3288F FALL RISK ASSESSMENT DOCD: CPT | Mod: ,,, | Performed by: FAMILY MEDICINE

## 2023-10-19 PROCEDURE — 83036 HEMOGLOBIN GLYCOSYLATED A1C: CPT | Mod: ,,, | Performed by: CLINICAL MEDICAL LABORATORY

## 2023-10-19 PROCEDURE — 83036 HEMOGLOBIN A1C: ICD-10-PCS | Mod: ,,, | Performed by: CLINICAL MEDICAL LABORATORY

## 2023-10-19 PROCEDURE — 3078F PR MOST RECENT DIASTOLIC BLOOD PRESSURE < 80 MM HG: ICD-10-PCS | Mod: ,,, | Performed by: FAMILY MEDICINE

## 2023-10-19 PROCEDURE — 3074F PR MOST RECENT SYSTOLIC BLOOD PRESSURE < 130 MM HG: ICD-10-PCS | Mod: ,,, | Performed by: FAMILY MEDICINE

## 2023-10-19 PROCEDURE — 82043 UR ALBUMIN QUANTITATIVE: CPT | Mod: ,,, | Performed by: CLINICAL MEDICAL LABORATORY

## 2023-10-19 PROCEDURE — 82043 MICROALBUMIN / CREATININE RATIO URINE: ICD-10-PCS | Mod: ,,, | Performed by: CLINICAL MEDICAL LABORATORY

## 2023-10-19 PROCEDURE — 1159F PR MEDICATION LIST DOCUMENTED IN MEDICAL RECORD: ICD-10-PCS | Mod: ,,, | Performed by: FAMILY MEDICINE

## 2023-10-19 PROCEDURE — 3074F SYST BP LT 130 MM HG: CPT | Mod: ,,, | Performed by: FAMILY MEDICINE

## 2023-10-19 PROCEDURE — 82570 MICROALBUMIN / CREATININE RATIO URINE: ICD-10-PCS | Mod: ,,, | Performed by: CLINICAL MEDICAL LABORATORY

## 2023-10-19 PROCEDURE — 85025 COMPLETE CBC W/AUTO DIFF WBC: CPT | Mod: ,,, | Performed by: CLINICAL MEDICAL LABORATORY

## 2023-10-19 PROCEDURE — 99214 OFFICE O/P EST MOD 30 MIN: CPT | Mod: ,,, | Performed by: FAMILY MEDICINE

## 2023-10-19 PROCEDURE — 85025 CBC WITH DIFFERENTIAL: ICD-10-PCS | Mod: ,,, | Performed by: CLINICAL MEDICAL LABORATORY

## 2023-10-19 PROCEDURE — 90686 IIV4 VACC NO PRSV 0.5 ML IM: CPT | Mod: ,,, | Performed by: FAMILY MEDICINE

## 2023-10-19 PROCEDURE — 1159F MED LIST DOCD IN RCRD: CPT | Mod: ,,, | Performed by: FAMILY MEDICINE

## 2023-10-19 PROCEDURE — 80048 BASIC METABOLIC PNL TOTAL CA: CPT | Mod: ,,, | Performed by: CLINICAL MEDICAL LABORATORY

## 2023-10-19 PROCEDURE — G0008 FLU VACCINE (QUAD) GREATER THAN OR EQUAL TO 3YO PRESERVATIVE FREE IM: ICD-10-PCS | Mod: ,,, | Performed by: FAMILY MEDICINE

## 2023-10-19 PROCEDURE — 90686 FLU VACCINE (QUAD) GREATER THAN OR EQUAL TO 3YO PRESERVATIVE FREE IM: ICD-10-PCS | Mod: ,,, | Performed by: FAMILY MEDICINE

## 2023-10-19 PROCEDURE — G0008 ADMIN INFLUENZA VIRUS VAC: HCPCS | Mod: ,,, | Performed by: FAMILY MEDICINE

## 2023-10-19 PROCEDURE — 80048 BASIC METABOLIC PANEL: ICD-10-PCS | Mod: ,,, | Performed by: CLINICAL MEDICAL LABORATORY

## 2023-10-19 PROCEDURE — 1101F PT FALLS ASSESS-DOCD LE1/YR: CPT | Mod: ,,, | Performed by: FAMILY MEDICINE

## 2023-10-19 PROCEDURE — 1101F PR PT FALLS ASSESS DOC 0-1 FALLS W/OUT INJ PAST YR: ICD-10-PCS | Mod: ,,, | Performed by: FAMILY MEDICINE

## 2023-10-19 RX ORDER — METFORMIN HYDROCHLORIDE 1000 MG/1
1000 TABLET ORAL 2 TIMES DAILY WITH MEALS
Qty: 180 TABLET | Refills: 0 | Status: CANCELLED | OUTPATIENT
Start: 2023-10-19

## 2023-10-19 RX ORDER — INSULIN DETEMIR 100 [IU]/ML
65 INJECTION, SOLUTION SUBCUTANEOUS NIGHTLY
Qty: 58.5 ML | Refills: 0 | Status: CANCELLED | OUTPATIENT
Start: 2023-10-19 | End: 2024-01-17

## 2023-10-19 RX ORDER — METFORMIN HYDROCHLORIDE 1000 MG/1
1000 TABLET ORAL 2 TIMES DAILY WITH MEALS
Qty: 180 TABLET | Refills: 0 | Status: SHIPPED | OUTPATIENT
Start: 2023-10-19 | End: 2024-01-24 | Stop reason: SDUPTHER

## 2023-10-19 RX ORDER — AMLODIPINE BESYLATE 10 MG/1
10 TABLET ORAL DAILY
Qty: 90 TABLET | Refills: 0 | Status: SHIPPED | OUTPATIENT
Start: 2023-10-19 | End: 2024-01-24 | Stop reason: SDUPTHER

## 2023-10-19 RX ORDER — PEN NEEDLE, DIABETIC 29 G X1/2"
2 NEEDLE, DISPOSABLE MISCELLANEOUS 2 TIMES DAILY
Qty: 100 EACH | Refills: 3 | Status: SHIPPED | OUTPATIENT
Start: 2023-10-19

## 2023-10-19 RX ORDER — PIOGLITAZONEHYDROCHLORIDE 15 MG/1
15 TABLET ORAL DAILY
Qty: 90 TABLET | Refills: 0 | Status: SHIPPED | OUTPATIENT
Start: 2023-10-19 | End: 2024-01-24 | Stop reason: SDUPTHER

## 2023-10-19 RX ORDER — AMLODIPINE BESYLATE 10 MG/1
10 TABLET ORAL DAILY
Qty: 90 TABLET | Refills: 0 | Status: CANCELLED | OUTPATIENT
Start: 2023-10-19

## 2023-10-19 RX ORDER — LIRAGLUTIDE 6 MG/ML
1.8 INJECTION SUBCUTANEOUS DAILY
Qty: 27 ML | Refills: 0 | Status: CANCELLED | OUTPATIENT
Start: 2023-10-19

## 2023-10-19 RX ORDER — LOSARTAN POTASSIUM AND HYDROCHLOROTHIAZIDE 25; 100 MG/1; MG/1
1 TABLET ORAL DAILY
Qty: 90 TABLET | Refills: 0 | Status: CANCELLED | OUTPATIENT
Start: 2023-10-19

## 2023-10-19 RX ORDER — PEN NEEDLE, DIABETIC 29 G X1/2"
2 NEEDLE, DISPOSABLE MISCELLANEOUS 2 TIMES DAILY
Qty: 100 EACH | Refills: 3 | Status: CANCELLED | OUTPATIENT
Start: 2023-10-19

## 2023-10-19 RX ORDER — PIOGLITAZONEHYDROCHLORIDE 15 MG/1
15 TABLET ORAL DAILY
Qty: 90 TABLET | Refills: 0 | Status: CANCELLED | OUTPATIENT
Start: 2023-10-19 | End: 2024-10-18

## 2023-10-19 RX ORDER — LOSARTAN POTASSIUM AND HYDROCHLOROTHIAZIDE 25; 100 MG/1; MG/1
1 TABLET ORAL DAILY
Qty: 90 TABLET | Refills: 0 | Status: SHIPPED | OUTPATIENT
Start: 2023-10-19 | End: 2024-01-24 | Stop reason: SDUPTHER

## 2023-10-19 RX ORDER — INSULIN DETEMIR 100 [IU]/ML
65 INJECTION, SOLUTION SUBCUTANEOUS NIGHTLY
Qty: 58.5 ML | Refills: 0 | Status: SHIPPED | OUTPATIENT
Start: 2023-10-19 | End: 2024-02-06

## 2023-10-19 RX ORDER — LIRAGLUTIDE 6 MG/ML
1.8 INJECTION SUBCUTANEOUS DAILY
Qty: 27 ML | Refills: 0 | Status: SHIPPED | OUTPATIENT
Start: 2023-10-19 | End: 2024-01-24 | Stop reason: SDUPTHER

## 2023-10-19 NOTE — PROGRESS NOTES
Dontrell Shetty MD   [facility]  [address] [place] [address]     PATIENT NAME: Milton Johnson  : 1945  DATE: 10/19/23  MRN: [id number]      Billing Provider: Dontrell Shetty MD  Level of Service: IN OFFICE/OUTPT VISIT, EST, LEVL IV, 30-39 MIN  Patient PCP Information       Provider PCP Type    Dontrell Shetty MD General            Reason for Visit / Chief Complaint: Diabetes (3 month checkup/lab and med refills), Hypertension, and Hyperlipidemia         History of Present Illness / Problem Focused Workflow     Milton Johnson presents to the clinic with Diabetes (3 month checkup/lab and med refills), Hypertension, and Hyperlipidemia     HPI    Review of Systems     Review of Systems   Constitutional:  Negative for activity change, appetite change, fatigue and fever.   HENT:  Negative for nasal congestion, ear pain, hearing loss, sinus pressure/congestion and sore throat.    Respiratory:  Negative for cough, chest tightness and shortness of breath.    Cardiovascular:  Negative for chest pain and palpitations.   Gastrointestinal:  Negative for abdominal pain and fecal incontinence.   Genitourinary:  Negative for bladder incontinence, difficulty urinating and erectile dysfunction.   Musculoskeletal:  Negative for arthralgias.   Integumentary:  Negative for rash.   Neurological:  Negative for dizziness and headaches.        Medical / Social / Family History     Past Medical History:   Diagnosis Date    Diabetes mellitus     Diabetes mellitus, type 2     Glaucoma     High cholesterol     Hypertension        Past Surgical History:   Procedure Laterality Date    CATARACT EXTRACTION      EYE SURGERY         Social History  Milton Johnson  reports that he has never smoked. He has been exposed to tobacco smoke. He has never used smokeless tobacco. He reports current alcohol use of about 1.0 standard drink of alcohol per week. He reports that he does not use  "drugs.    Family History  Milton Alex  family history is not on file. He was adopted.    Medications and Allergies     Medications  Outpatient Medications Marked as Taking for the 10/19/23 encounter (Office Visit) with Dontrell Shetty MD   Medication Sig Dispense Refill    aspirin 81 MG Chew Take 81 mg by mouth. 2 times per week      bimatoprost (LUMIGAN) 0.03 % ophthalmic drops 1 drop every evening.      carvediloL (COREG) 3.125 MG tablet Take 3.125 mg by mouth 2 (two) times daily with meals.      pravastatin (PRAVACHOL) 40 MG tablet Take 40 mg by mouth once daily.      [DISCONTINUED] amLODIPine (NORVASC) 10 MG tablet Take 1 tablet (10 mg total) by mouth once daily. 90 tablet 0    [DISCONTINUED] liraglutide 0.6 mg/0.1 mL, 18 mg/3 mL, subq PNIJ (VICTOZA 3-LORI) 0.6 mg/0.1 mL (18 mg/3 mL) PnIj pen Inject 1.8 mg into the skin once daily. 27 mL 0    [DISCONTINUED] losartan-hydrochlorothiazide 100-25 mg (HYZAAR) 100-25 mg per tablet Take 1 tablet by mouth once daily. 90 tablet 0    [DISCONTINUED] metFORMIN (GLUCOPHAGE) 1000 MG tablet Take 1 tablet (1,000 mg total) by mouth 2 (two) times daily with meals. 180 tablet 0    [DISCONTINUED] pen needle, diabetic 31 gauge x 1/4" Ndle Inject 2 each into the skin 2 (two) times a day. 100 each 3    [DISCONTINUED] pioglitazone (ACTOS) 15 MG tablet Take 1 tablet (15 mg total) by mouth once daily. 90 tablet 0       Allergies  Review of patient's allergies indicates:  No Known Allergies    Physical Examination     Vitals:    10/19/23 0830   BP: 128/70   Pulse: 88   Temp: 98 °F (36.7 °C)     Physical Exam  Constitutional:       General: He is not in acute distress.     Appearance: He is not ill-appearing.   HENT:      Head: Normocephalic and atraumatic.      Right Ear: Tympanic membrane and ear canal normal.      Left Ear: Tympanic membrane and ear canal normal.      Nose: Nose normal. No congestion or rhinorrhea.   Eyes:      Pupils: Pupils are equal, round, and reactive to " light.   Cardiovascular:      Rate and Rhythm: Normal rate and regular rhythm.      Pulses: Normal pulses.      Heart sounds: No murmur heard.  Pulmonary:      Effort: No respiratory distress.      Breath sounds: No wheezing, rhonchi or rales.   Abdominal:      General: Bowel sounds are normal.      Palpations: Abdomen is soft.      Tenderness: There is no abdominal tenderness.      Hernia: No hernia is present.   Musculoskeletal:      Cervical back: Normal range of motion and neck supple.   Lymphadenopathy:      Cervical: No cervical adenopathy.   Skin:     General: Skin is warm and dry.   Neurological:      Mental Status: He is alert.   Psychiatric:         Behavior: Behavior normal.         Thought Content: Thought content normal.          Assessment and Plan (including Health Maintenance)   :    Plan:         Health Maintenance Due   Topic Date Due    Hepatitis C Screening  Never done    Pneumococcal Vaccines (Age 65+) (1 - PCV) Never done    TETANUS VACCINE  Never done    Shingles Vaccine (1 of 2) Never done    COVID-19 Vaccine (5 - 2023-24 season) 09/01/2023    Diabetes Urine Screening  10/11/2023    Hemoglobin A1c  10/18/2023    Eye Exam  01/13/2024       Problem List Items Addressed This Visit          Cardiac/Vascular    Hyperlipidemia    Relevant Orders    CBC Auto Differential    Basic Metabolic Panel    Hypertension    Relevant Orders    CBC Auto Differential    Basic Metabolic Panel       Endocrine    Type 2 diabetes mellitus without complication, with long-term current use of insulin    Relevant Medications    insulin detemir U-100, Levemir, (LEVEMIR FLEXTOUCH U100 INSULIN) 100 unit/mL (3 mL) InPn pen    liraglutide 0.6 mg/0.1 mL, 18 mg/3 mL, subq PNIJ (VICTOZA 3-LORI) 0.6 mg/0.1 mL (18 mg/3 mL) PnIj pen    metFORMIN (GLUCOPHAGE) 1000 MG tablet    pioglitazone (ACTOS) 15 MG tablet    Other Relevant Orders    CBC Auto Differential    Basic Metabolic Panel    Hemoglobin A1C    Microalbumin/Creatinine  Ratio, Urine     Other Visit Diagnoses       Hemoglobin A1c between 7.0% and 9.0%    -  Primary    Relevant Orders    Hemoglobin A1C    Microalbumin/Creatinine Ratio, Urine    Need for immunization against influenza        Relevant Orders    Influenza - Quadrivalent (PF) (Completed)          Hemoglobin A1c between 7.0% and 9.0%  -     Hemoglobin A1C; Future; Expected date: 10/19/2023  -     Microalbumin/Creatinine Ratio, Urine; Future; Expected date: 10/19/2023    Type 2 diabetes mellitus without complication, with long-term current use of insulin  -     CBC Auto Differential; Future; Expected date: 10/19/2023  -     Basic Metabolic Panel; Future; Expected date: 10/19/2023  -     Hemoglobin A1C; Future; Expected date: 10/19/2023  -     Microalbumin/Creatinine Ratio, Urine; Future; Expected date: 10/19/2023    Hypertension, unspecified type  -     CBC Auto Differential; Future; Expected date: 10/19/2023  -     Basic Metabolic Panel; Future; Expected date: 10/19/2023    Hyperlipidemia, unspecified hyperlipidemia type  -     CBC Auto Differential; Future; Expected date: 10/19/2023  -     Basic Metabolic Panel; Future; Expected date: 10/19/2023    Need for immunization against influenza  -     Influenza - Quadrivalent (PF)    Other orders  -     amLODIPine (NORVASC) 10 MG tablet; Take 1 tablet (10 mg total) by mouth once daily.  Dispense: 90 tablet; Refill: 0  -     insulin detemir U-100, Levemir, (LEVEMIR FLEXTOUCH U100 INSULIN) 100 unit/mL (3 mL) InPn pen; Inject 65 Units into the skin every evening.  Dispense: 58.5 mL; Refill: 0  -     liraglutide 0.6 mg/0.1 mL, 18 mg/3 mL, subq PNIJ (VICTOZA 3-LORI) 0.6 mg/0.1 mL (18 mg/3 mL) PnIj pen; Inject 1.8 mg into the skin once daily.  Dispense: 27 mL; Refill: 0  -     losartan-hydrochlorothiazide 100-25 mg (HYZAAR) 100-25 mg per tablet; Take 1 tablet by mouth once daily.  Dispense: 90 tablet; Refill: 0  -     metFORMIN (GLUCOPHAGE) 1000 MG tablet; Take 1 tablet (1,000 mg  "total) by mouth 2 (two) times daily with meals.  Dispense: 180 tablet; Refill: 0  -     pen needle, diabetic 31 gauge x 1/4" Ndle; Inject 2 each into the skin 2 (two) times a day.  Dispense: 100 each; Refill: 3  -     pioglitazone (ACTOS) 15 MG tablet; Take 1 tablet (15 mg total) by mouth once daily.  Dispense: 90 tablet; Refill: 0       Health Maintenance Topics with due status: Not Due       Topic Last Completion Date    Lipid Panel 07/18/2023       Procedures     Future Appointments   Date Time Provider Department Center   1/23/2024  1:00 PM [facility] [place]        No follow-ups on file.       Signature:  Dontrell Shetty MD  [facility]  [address] [place] [address]  [phone] Phone  [phone] Fax    Date of encounter: 10/19/23    "

## 2023-10-19 NOTE — PROGRESS NOTES
Dontrell Shetty MD   [facility]  [address] [place] [address]     PATIENT NAME: Milton Johnson  : 1945  DATE: 10/19/23  MRN: [id number]      Billing Provider: Dontrell Shetty MD  Level of Service:   Patient PCP Information       Provider PCP Type    Dontrell Shetty MD General            Reason for Visit / Chief Complaint: Diabetes (3 month checkup/lab and med refills), Hypertension, and Hyperlipidemia         History of Present Illness / Problem Focused Workflow     Milton Johnson presents to the clinic with Diabetes (3 month checkup/lab and med refills), Hypertension, and Hyperlipidemia     HPI    Review of Systems     Review of Systems   Constitutional:  Negative for activity change, appetite change, fatigue and fever.   HENT:  Negative for nasal congestion, ear pain, hearing loss, sinus pressure/congestion and sore throat.    Respiratory:  Negative for cough, chest tightness and shortness of breath.    Cardiovascular:  Negative for chest pain and palpitations.   Gastrointestinal:  Negative for abdominal pain and fecal incontinence.   Genitourinary:  Negative for bladder incontinence, difficulty urinating and erectile dysfunction.   Musculoskeletal:  Negative for arthralgias.   Integumentary:  Negative for rash.   Neurological:  Negative for dizziness and headaches.        Medical / Social / Family History     Past Medical History:   Diagnosis Date    Diabetes mellitus     Diabetes mellitus, type 2     Glaucoma     High cholesterol     Hypertension        Past Surgical History:   Procedure Laterality Date    CATARACT EXTRACTION      EYE SURGERY         Social History  Milton Johnson  reports that he has never smoked. He has been exposed to tobacco smoke. He has never used smokeless tobacco. He reports current alcohol use of about 1.0 standard drink of alcohol per week. He reports that he does not use drugs.    Family History  Milton Johnson  family history  "is not on file. He was adopted.    Medications and Allergies     Medications  Outpatient Medications Marked as Taking for the 10/19/23 encounter (Office Visit) with Dontrell Shetty MD   Medication Sig Dispense Refill    aspirin 81 MG Chew Take 81 mg by mouth. 2 times per week      bimatoprost (LUMIGAN) 0.03 % ophthalmic drops 1 drop every evening.      carvediloL (COREG) 3.125 MG tablet Take 3.125 mg by mouth 2 (two) times daily with meals.      pravastatin (PRAVACHOL) 40 MG tablet Take 40 mg by mouth once daily.      [DISCONTINUED] amLODIPine (NORVASC) 10 MG tablet Take 1 tablet (10 mg total) by mouth once daily. 90 tablet 0    [DISCONTINUED] liraglutide 0.6 mg/0.1 mL, 18 mg/3 mL, subq PNIJ (VICTOZA 3-LORI) 0.6 mg/0.1 mL (18 mg/3 mL) PnIj pen Inject 1.8 mg into the skin once daily. 27 mL 0    [DISCONTINUED] losartan-hydrochlorothiazide 100-25 mg (HYZAAR) 100-25 mg per tablet Take 1 tablet by mouth once daily. 90 tablet 0    [DISCONTINUED] metFORMIN (GLUCOPHAGE) 1000 MG tablet Take 1 tablet (1,000 mg total) by mouth 2 (two) times daily with meals. 180 tablet 0    [DISCONTINUED] pen needle, diabetic 31 gauge x 1/4" Ndle Inject 2 each into the skin 2 (two) times a day. 100 each 3    [DISCONTINUED] pioglitazone (ACTOS) 15 MG tablet Take 1 tablet (15 mg total) by mouth once daily. 90 tablet 0       Allergies  Review of patient's allergies indicates:  No Known Allergies    Physical Examination     Vitals:    10/19/23 0830   BP: 128/70   Pulse: 88   Temp: 98 °F (36.7 °C)     Physical Exam  Constitutional:       General: He is not in acute distress.     Appearance: He is not ill-appearing.   HENT:      Head: Normocephalic and atraumatic.      Right Ear: Tympanic membrane and ear canal normal.      Left Ear: Tympanic membrane and ear canal normal.      Nose: Nose normal. No congestion or rhinorrhea.   Eyes:      Pupils: Pupils are equal, round, and reactive to light.   Cardiovascular:      Rate and Rhythm: Normal rate " and regular rhythm.      Pulses: Normal pulses.      Heart sounds: No murmur heard.  Pulmonary:      Effort: No respiratory distress.      Breath sounds: No wheezing, rhonchi or rales.   Abdominal:      General: Bowel sounds are normal.      Palpations: Abdomen is soft.      Tenderness: There is no abdominal tenderness.      Hernia: No hernia is present.   Musculoskeletal:      Cervical back: Normal range of motion and neck supple.   Lymphadenopathy:      Cervical: No cervical adenopathy.   Skin:     General: Skin is warm and dry.   Neurological:      Mental Status: He is alert.   Psychiatric:         Behavior: Behavior normal.         Thought Content: Thought content normal.          Assessment and Plan (including Health Maintenance)   :    Plan:         Health Maintenance Due   Topic Date Due    Hepatitis C Screening  Never done    Pneumococcal Vaccines (Age 65+) (1 - PCV) Never done    TETANUS VACCINE  Never done    Shingles Vaccine (1 of 2) Never done    COVID-19 Vaccine (5 - 2023-24 season) 09/01/2023    Diabetes Urine Screening  10/11/2023    Hemoglobin A1c  10/18/2023    Eye Exam  01/13/2024       Problem List Items Addressed This Visit          Cardiac/Vascular    Hyperlipidemia    Relevant Orders    CBC Auto Differential    Basic Metabolic Panel    Hypertension    Relevant Orders    CBC Auto Differential    Basic Metabolic Panel       Endocrine    Type 2 diabetes mellitus without complication, with long-term current use of insulin    Relevant Medications    insulin detemir U-100, Levemir, (LEVEMIR FLEXTOUCH U100 INSULIN) 100 unit/mL (3 mL) InPn pen    liraglutide 0.6 mg/0.1 mL, 18 mg/3 mL, subq PNIJ (VICTOZA 3-LORI) 0.6 mg/0.1 mL (18 mg/3 mL) PnIj pen    metFORMIN (GLUCOPHAGE) 1000 MG tablet    pioglitazone (ACTOS) 15 MG tablet    Other Relevant Orders    CBC Auto Differential    Basic Metabolic Panel    Hemoglobin A1C    Microalbumin/Creatinine Ratio, Urine     Other Visit Diagnoses       Hemoglobin A1c  between 7.0% and 9.0%    -  Primary    Relevant Orders    Hemoglobin A1C    Microalbumin/Creatinine Ratio, Urine    Need for immunization against influenza        Relevant Orders    Influenza - Quadrivalent (PF) (Completed)          Hemoglobin A1c between 7.0% and 9.0%  -     Hemoglobin A1C; Future; Expected date: 10/19/2023  -     Microalbumin/Creatinine Ratio, Urine; Future; Expected date: 10/19/2023    Type 2 diabetes mellitus without complication, with long-term current use of insulin  -     CBC Auto Differential; Future; Expected date: 10/19/2023  -     Basic Metabolic Panel; Future; Expected date: 10/19/2023  -     Hemoglobin A1C; Future; Expected date: 10/19/2023  -     Microalbumin/Creatinine Ratio, Urine; Future; Expected date: 10/19/2023    Hypertension, unspecified type  -     CBC Auto Differential; Future; Expected date: 10/19/2023  -     Basic Metabolic Panel; Future; Expected date: 10/19/2023    Hyperlipidemia, unspecified hyperlipidemia type  -     CBC Auto Differential; Future; Expected date: 10/19/2023  -     Basic Metabolic Panel; Future; Expected date: 10/19/2023    Need for immunization against influenza  -     Influenza - Quadrivalent (PF)    Other orders  -     amLODIPine (NORVASC) 10 MG tablet; Take 1 tablet (10 mg total) by mouth once daily.  Dispense: 90 tablet; Refill: 0  -     insulin detemir U-100, Levemir, (LEVEMIR FLEXTOUCH U100 INSULIN) 100 unit/mL (3 mL) InPn pen; Inject 65 Units into the skin every evening.  Dispense: 58.5 mL; Refill: 0  -     liraglutide 0.6 mg/0.1 mL, 18 mg/3 mL, subq PNIJ (VICTOZA 3-LORI) 0.6 mg/0.1 mL (18 mg/3 mL) PnIj pen; Inject 1.8 mg into the skin once daily.  Dispense: 27 mL; Refill: 0  -     losartan-hydrochlorothiazide 100-25 mg (HYZAAR) 100-25 mg per tablet; Take 1 tablet by mouth once daily.  Dispense: 90 tablet; Refill: 0  -     metFORMIN (GLUCOPHAGE) 1000 MG tablet; Take 1 tablet (1,000 mg total) by mouth 2 (two) times daily with meals.  Dispense: 180  "tablet; Refill: 0  -     pen needle, diabetic 31 gauge x 1/4" Ndle; Inject 2 each into the skin 2 (two) times a day.  Dispense: 100 each; Refill: 3  -     pioglitazone (ACTOS) 15 MG tablet; Take 1 tablet (15 mg total) by mouth once daily.  Dispense: 90 tablet; Refill: 0       Health Maintenance Topics with due status: Not Due       Topic Last Completion Date    Lipid Panel 07/18/2023       Procedures     Future Appointments   Date Time Provider Department Center   1/23/2024  1:00 PM Daria Boyd, MELISA [facility] [place]        No follow-ups on file.       Signature:  Dontrell Shetty MD  [facility]  [address] [place] [address]  [phone] Phone  [phone] Fax    Date of encounter: 10/19/23    "

## 2023-11-20 ENCOUNTER — TELEPHONE (OUTPATIENT)
Dept: FAMILY MEDICINE | Facility: CLINIC | Age: 78
End: 2023-11-20
Payer: MEDICARE

## 2023-11-20 NOTE — TELEPHONE ENCOUNTER
Patient got letter in the mail from his insurance company stating they will no longer cover his Levemir after the first of the year. Tresiba, Toujeo and Lantus were listed as alternatives.     Change to Lantus 65 units vo Dr Shetty    Patient states he has enough Levemir to last until his visit on 1/24/24. Would like for it to be changed then.      ----- Message from Tess Moraes sent at 11/20/2023 10:14 AM CST -----  Please call pt. He has question about meds. [phone]

## 2024-01-24 ENCOUNTER — OFFICE VISIT (OUTPATIENT)
Dept: FAMILY MEDICINE | Facility: CLINIC | Age: 79
End: 2024-01-24
Payer: MEDICARE

## 2024-01-24 VITALS
DIASTOLIC BLOOD PRESSURE: 62 MMHG | HEART RATE: 72 BPM | WEIGHT: 241.38 LBS | TEMPERATURE: 98 F | OXYGEN SATURATION: 96 % | HEIGHT: 73 IN | SYSTOLIC BLOOD PRESSURE: 104 MMHG | BODY MASS INDEX: 31.99 KG/M2

## 2024-01-24 DIAGNOSIS — Z79.4 TYPE 2 DIABETES MELLITUS WITHOUT COMPLICATION, WITH LONG-TERM CURRENT USE OF INSULIN: ICD-10-CM

## 2024-01-24 DIAGNOSIS — E11.9 TYPE 2 DIABETES MELLITUS WITHOUT COMPLICATION, WITH LONG-TERM CURRENT USE OF INSULIN: ICD-10-CM

## 2024-01-24 DIAGNOSIS — Z12.5 SCREENING FOR MALIGNANT NEOPLASM OF PROSTATE: ICD-10-CM

## 2024-01-24 DIAGNOSIS — R73.9 HEMOGLOBIN A1C BETWEEN 7.0% AND 9.0%: ICD-10-CM

## 2024-01-24 DIAGNOSIS — I10 HYPERTENSION, UNSPECIFIED TYPE: ICD-10-CM

## 2024-01-24 DIAGNOSIS — J01.00 ACUTE NON-RECURRENT MAXILLARY SINUSITIS: ICD-10-CM

## 2024-01-24 DIAGNOSIS — E78.5 HYPERLIPIDEMIA, UNSPECIFIED HYPERLIPIDEMIA TYPE: Primary | ICD-10-CM

## 2024-01-24 LAB
ALBUMIN SERPL BCP-MCNC: 3.5 G/DL (ref 3.5–5)
ALBUMIN/GLOB SERPL: 1.2 {RATIO}
ALP SERPL-CCNC: 63 U/L (ref 45–115)
ALT SERPL W P-5'-P-CCNC: 20 U/L (ref 16–61)
ANION GAP SERPL CALCULATED.3IONS-SCNC: 17 MMOL/L (ref 7–16)
AST SERPL W P-5'-P-CCNC: 9 U/L (ref 15–37)
BASOPHILS # BLD AUTO: 0.04 K/UL (ref 0–0.2)
BASOPHILS NFR BLD AUTO: 0.4 % (ref 0–1)
BILIRUB SERPL-MCNC: 0.7 MG/DL (ref ?–1.2)
BUN SERPL-MCNC: 17 MG/DL (ref 7–18)
BUN/CREAT SERPL: 13 (ref 6–20)
CALCIUM SERPL-MCNC: 9 MG/DL (ref 8.5–10.1)
CHLORIDE SERPL-SCNC: 77 MMOL/L (ref 98–107)
CHOLEST SERPL-MCNC: 73 MG/DL (ref 0–200)
CHOLEST/HDLC SERPL: 2.1 {RATIO}
CO2 SERPL-SCNC: 28 MMOL/L (ref 21–32)
CREAT SERPL-MCNC: 1.33 MG/DL (ref 0.7–1.3)
DIFFERENTIAL METHOD BLD: ABNORMAL
EGFR (NO RACE VARIABLE) (RUSH/TITUS): 55 ML/MIN/1.73M2
EOSINOPHIL # BLD AUTO: 0.08 K/UL (ref 0–0.5)
EOSINOPHIL NFR BLD AUTO: 0.8 % (ref 1–4)
ERYTHROCYTE [DISTWIDTH] IN BLOOD BY AUTOMATED COUNT: 11.7 % (ref 11.5–14.5)
EST. AVERAGE GLUCOSE BLD GHB EST-MCNC: 206 MG/DL
GLOBULIN SER-MCNC: 2.9 G/DL (ref 2–4)
GLUCOSE SERPL-MCNC: 147 MG/DL (ref 74–106)
HBA1C MFR BLD HPLC: 8.8 % (ref 4.5–6.6)
HCT VFR BLD AUTO: 40.1 % (ref 40–54)
HDLC SERPL-MCNC: 34 MG/DL (ref 40–60)
HGB BLD-MCNC: 14.7 G/DL (ref 13.5–18)
IMM GRANULOCYTES # BLD AUTO: 0.05 K/UL (ref 0–0.04)
IMM GRANULOCYTES NFR BLD: 0.5 % (ref 0–0.4)
LDLC SERPL CALC-MCNC: 20 MG/DL
LYMPHOCYTES # BLD AUTO: 1 K/UL (ref 1–4.8)
LYMPHOCYTES NFR BLD AUTO: 9.9 % (ref 27–41)
MCH RBC QN AUTO: 29.4 PG (ref 27–31)
MCHC RBC AUTO-ENTMCNC: 36.7 G/DL (ref 32–36)
MCV RBC AUTO: 80.2 FL (ref 80–96)
MONOCYTES # BLD AUTO: 0.58 K/UL (ref 0–0.8)
MONOCYTES NFR BLD AUTO: 5.8 % (ref 2–6)
MPC BLD CALC-MCNC: 9.5 FL (ref 9.4–12.4)
NEUTROPHILS # BLD AUTO: 8.33 K/UL (ref 1.8–7.7)
NEUTROPHILS NFR BLD AUTO: 82.6 % (ref 53–65)
NONHDLC SERPL-MCNC: 39 MG/DL
NRBC # BLD AUTO: 0 X10E3/UL
NRBC, AUTO (.00): 0 %
PLATELET # BLD AUTO: 310 K/UL (ref 150–400)
POTASSIUM SERPL-SCNC: 3.8 MMOL/L (ref 3.5–5.1)
PROT SERPL-MCNC: 6.4 G/DL (ref 6.4–8.2)
PSA SERPL-MCNC: 1.17 NG/ML
RBC # BLD AUTO: 5 M/UL (ref 4.6–6.2)
SODIUM SERPL-SCNC: 118 MMOL/L (ref 136–145)
TRIGL SERPL-MCNC: 93 MG/DL (ref 35–150)
VLDLC SERPL-MCNC: 19 MG/DL
WBC # BLD AUTO: 10.08 K/UL (ref 4.5–11)

## 2024-01-24 PROCEDURE — 3078F DIAST BP <80 MM HG: CPT | Mod: ,,, | Performed by: FAMILY MEDICINE

## 2024-01-24 PROCEDURE — 99214 OFFICE O/P EST MOD 30 MIN: CPT | Mod: 25,,, | Performed by: FAMILY MEDICINE

## 2024-01-24 PROCEDURE — 83036 HEMOGLOBIN GLYCOSYLATED A1C: CPT | Mod: ,,, | Performed by: CLINICAL MEDICAL LABORATORY

## 2024-01-24 PROCEDURE — 80061 LIPID PANEL: CPT | Mod: ,,, | Performed by: CLINICAL MEDICAL LABORATORY

## 2024-01-24 PROCEDURE — 85025 COMPLETE CBC W/AUTO DIFF WBC: CPT | Mod: ,,, | Performed by: CLINICAL MEDICAL LABORATORY

## 2024-01-24 PROCEDURE — G0103 PSA SCREENING: HCPCS | Mod: ,,, | Performed by: CLINICAL MEDICAL LABORATORY

## 2024-01-24 PROCEDURE — 3074F SYST BP LT 130 MM HG: CPT | Mod: ,,, | Performed by: FAMILY MEDICINE

## 2024-01-24 PROCEDURE — 80053 COMPREHEN METABOLIC PANEL: CPT | Mod: ,,, | Performed by: CLINICAL MEDICAL LABORATORY

## 2024-01-24 PROCEDURE — 96372 THER/PROPH/DIAG INJ SC/IM: CPT | Mod: ,,, | Performed by: FAMILY MEDICINE

## 2024-01-24 PROCEDURE — 1101F PT FALLS ASSESS-DOCD LE1/YR: CPT | Mod: ,,, | Performed by: FAMILY MEDICINE

## 2024-01-24 PROCEDURE — 1159F MED LIST DOCD IN RCRD: CPT | Mod: ,,, | Performed by: FAMILY MEDICINE

## 2024-01-24 PROCEDURE — 3288F FALL RISK ASSESSMENT DOCD: CPT | Mod: ,,, | Performed by: FAMILY MEDICINE

## 2024-01-24 RX ORDER — CEFTRIAXONE 1 G/1
1 INJECTION, POWDER, FOR SOLUTION INTRAMUSCULAR; INTRAVENOUS
Status: COMPLETED | OUTPATIENT
Start: 2024-01-24 | End: 2024-01-24

## 2024-01-24 RX ORDER — DEXAMETHASONE SODIUM PHOSPHATE 4 MG/ML
2 INJECTION, SOLUTION INTRA-ARTICULAR; INTRALESIONAL; INTRAMUSCULAR; INTRAVENOUS; SOFT TISSUE
Status: COMPLETED | OUTPATIENT
Start: 2024-01-24 | End: 2024-01-24

## 2024-01-24 RX ORDER — INSULIN GLARGINE 300 U/ML
30 INJECTION, SOLUTION SUBCUTANEOUS NIGHTLY
Qty: 3 ML | Refills: 2 | Status: SHIPPED | OUTPATIENT
Start: 2024-01-24 | End: 2024-04-30 | Stop reason: SDUPTHER

## 2024-01-24 RX ORDER — METHYLPREDNISOLONE ACETATE 80 MG/ML
20 INJECTION, SUSPENSION INTRA-ARTICULAR; INTRALESIONAL; INTRAMUSCULAR; SOFT TISSUE
Status: COMPLETED | OUTPATIENT
Start: 2024-01-24 | End: 2024-01-24

## 2024-01-24 RX ORDER — METFORMIN HYDROCHLORIDE 1000 MG/1
1000 TABLET ORAL 2 TIMES DAILY WITH MEALS
Qty: 180 TABLET | Refills: 0 | Status: SHIPPED | OUTPATIENT
Start: 2024-01-24 | End: 2024-04-30 | Stop reason: SDUPTHER

## 2024-01-24 RX ORDER — LIRAGLUTIDE 6 MG/ML
1.8 INJECTION SUBCUTANEOUS DAILY
Qty: 27 ML | Refills: 0 | Status: SHIPPED | OUTPATIENT
Start: 2024-01-24 | End: 2024-04-30 | Stop reason: SDUPTHER

## 2024-01-24 RX ORDER — PIOGLITAZONEHYDROCHLORIDE 15 MG/1
15 TABLET ORAL DAILY
Qty: 90 TABLET | Refills: 0 | Status: SHIPPED | OUTPATIENT
Start: 2024-01-24 | End: 2024-04-30 | Stop reason: SDUPTHER

## 2024-01-24 RX ORDER — AMLODIPINE BESYLATE 10 MG/1
10 TABLET ORAL DAILY
Qty: 90 TABLET | Refills: 0 | Status: SHIPPED | OUTPATIENT
Start: 2024-01-24 | End: 2024-04-30 | Stop reason: SDUPTHER

## 2024-01-24 RX ORDER — LOSARTAN POTASSIUM AND HYDROCHLOROTHIAZIDE 25; 100 MG/1; MG/1
1 TABLET ORAL DAILY
Qty: 90 TABLET | Refills: 0 | Status: SHIPPED | OUTPATIENT
Start: 2024-01-24 | End: 2024-04-30 | Stop reason: SDUPTHER

## 2024-01-24 RX ADMIN — DEXAMETHASONE SODIUM PHOSPHATE 2 MG: 4 INJECTION, SOLUTION INTRA-ARTICULAR; INTRALESIONAL; INTRAMUSCULAR; INTRAVENOUS; SOFT TISSUE at 08:01

## 2024-01-24 RX ADMIN — METHYLPREDNISOLONE ACETATE 20 MG: 80 INJECTION, SUSPENSION INTRA-ARTICULAR; INTRALESIONAL; INTRAMUSCULAR; SOFT TISSUE at 08:01

## 2024-01-24 RX ADMIN — CEFTRIAXONE 1 G: 1 INJECTION, POWDER, FOR SOLUTION INTRAMUSCULAR; INTRAVENOUS at 08:01

## 2024-01-24 NOTE — PROGRESS NOTES
Dontrell Shetty MD   [facility]  [address] [place] [address]     PATIENT NAME: Milton Johnson  : 1945  DATE: 24  MRN: [id number]      Billing Provider: Dontrell Shetty MD  Level of Service: ND OFFICE/OUTPT VISIT, EST, LEVL IV, 30-39 MIN  Patient PCP Information       Provider PCP Type    Dontrell Shetty MD General            Reason for Visit / Chief Complaint: Sinusitis (Cough and congestion. Patient states he's also been alittle more unbalanced x6 days ) and Diabetes (Check up, labs, refills )         History of Present Illness / Problem Focused Workflow     Milton Johnson presents to the clinic with Sinusitis (Cough and congestion. Patient states he's also been alittle more unbalanced x6 days ) and Diabetes (Check up, labs, refills )     HPI    Review of Systems     Review of Systems   Constitutional:  Negative for activity change, appetite change, fatigue and fever.   HENT:  Positive for nasal congestion, rhinorrhea, sinus pressure/congestion and sore throat. Negative for ear pain and hearing loss.    Respiratory:  Positive for cough. Negative for chest tightness and shortness of breath.    Cardiovascular:  Negative for chest pain and palpitations.   Gastrointestinal:  Negative for abdominal pain and fecal incontinence.   Genitourinary:  Negative for bladder incontinence, difficulty urinating and erectile dysfunction.   Musculoskeletal:  Negative for arthralgias.   Integumentary:  Negative for rash.   Neurological:  Negative for dizziness and headaches.        Medical / Social / Family History     Past Medical History:   Diagnosis Date    Diabetes mellitus     Diabetes mellitus, type 2     Glaucoma     High cholesterol     Hypertension        Past Surgical History:   Procedure Laterality Date    CATARACT EXTRACTION      EYE SURGERY         Social History  Milton Johnson  reports that he has never smoked. He has been exposed to tobacco smoke. He has  "never used smokeless tobacco. He reports current alcohol use of about 1.0 standard drink of alcohol per week. He reports that he does not use drugs.    Family History  Milton Alex  family history is not on file. He was adopted.    Medications and Allergies     Medications  Outpatient Medications Marked as Taking for the 1/24/24 encounter (Office Visit) with Dontrell Shetty MD   Medication Sig Dispense Refill    aspirin 81 MG Chew Take 81 mg by mouth. 2 times per week      bimatoprost (LUMIGAN) 0.03 % ophthalmic drops 1 drop every evening.      carvediloL (COREG) 3.125 MG tablet Take 3.125 mg by mouth 2 (two) times daily with meals.      dorzolamide (TRUSOPT) 2 % ophthalmic solution 1 drop 3 (three) times daily.      insulin detemir U-100, Levemir, (LEVEMIR FLEXTOUCH U100 INSULIN) 100 unit/mL (3 mL) InPn pen Inject 65 Units into the skin every evening. 58.5 mL 0    pen needle, diabetic 31 gauge x 1/4" Ndle Inject 2 each into the skin 2 (two) times a day. 100 each 3    rosuvastatin (CRESTOR) 40 MG Tab Take 40 mg by mouth every evening.      [DISCONTINUED] amLODIPine (NORVASC) 10 MG tablet Take 1 tablet (10 mg total) by mouth once daily. 90 tablet 0    [DISCONTINUED] liraglutide 0.6 mg/0.1 mL, 18 mg/3 mL, subq PNIJ (VICTOZA 3-LORI) 0.6 mg/0.1 mL (18 mg/3 mL) PnIj pen Inject 1.8 mg into the skin once daily. 27 mL 0    [DISCONTINUED] losartan-hydrochlorothiazide 100-25 mg (HYZAAR) 100-25 mg per tablet Take 1 tablet by mouth once daily. 90 tablet 0    [DISCONTINUED] metFORMIN (GLUCOPHAGE) 1000 MG tablet Take 1 tablet (1,000 mg total) by mouth 2 (two) times daily with meals. 180 tablet 0    [DISCONTINUED] pioglitazone (ACTOS) 15 MG tablet Take 1 tablet (15 mg total) by mouth once daily. 90 tablet 0       Allergies  Review of patient's allergies indicates:  No Known Allergies    Physical Examination     Vitals:    01/24/24 0753   BP: 104/62   Pulse: 72   Temp: 97.7 °F (36.5 °C)     Physical Exam  Constitutional:  "      General: He is not in acute distress.     Appearance: He is not ill-appearing.   HENT:      Head: Normocephalic and atraumatic.      Right Ear: Tympanic membrane and ear canal normal.      Left Ear: Tympanic membrane and ear canal normal.      Nose: Congestion and rhinorrhea present.   Eyes:      Pupils: Pupils are equal, round, and reactive to light.   Cardiovascular:      Rate and Rhythm: Normal rate and regular rhythm.      Pulses: Normal pulses.      Heart sounds: No murmur heard.  Pulmonary:      Effort: No respiratory distress.      Breath sounds: No wheezing, rhonchi or rales.   Abdominal:      General: Bowel sounds are normal.      Palpations: Abdomen is soft.      Tenderness: There is no abdominal tenderness.      Hernia: No hernia is present.   Musculoskeletal:      Cervical back: Normal range of motion and neck supple.   Lymphadenopathy:      Cervical: No cervical adenopathy.   Skin:     General: Skin is warm and dry.   Neurological:      Mental Status: He is alert.   Psychiatric:         Behavior: Behavior normal.         Thought Content: Thought content normal.          Assessment and Plan (including Health Maintenance)   :    Plan:         Health Maintenance Due   Topic Date Due    Hepatitis C Screening  Never done    Pneumococcal Vaccines (Age 65+) (1 of 2 - PCV) Never done    TETANUS VACCINE  Never done    Shingles Vaccine (1 of 2) Never done    RSV Vaccine (Age 60+ and Pregnant patients) (1 - 1-dose 60+ series) Never done    COVID-19 Vaccine (5 - 2023-24 season) 09/01/2023    Eye Exam  01/13/2024       Problem List Items Addressed This Visit          Cardiac/Vascular    Hyperlipidemia - Primary    Relevant Orders    Lipid Panel (Completed)    Hypertension    Relevant Orders    CBC Auto Differential (Completed)    Comprehensive Metabolic Panel (Completed)       Endocrine    Type 2 diabetes mellitus without complication, with long-term current use of insulin    Relevant Medications    liraglutide  0.6 mg/0.1 mL, 18 mg/3 mL, subq PNIJ (VICTOZA 3-LORI) 0.6 mg/0.1 mL (18 mg/3 mL) PnIj pen    metFORMIN (GLUCOPHAGE) 1000 MG tablet    pioglitazone (ACTOS) 15 MG tablet    insulin glargine, TOUJEO, (TOUJEO SOLOSTAR U-300 INSULIN) 300 unit/mL (1.5 mL) InPn pen    Other Relevant Orders    Hemoglobin A1C (Completed)     Other Visit Diagnoses       Screening for malignant neoplasm of prostate        Relevant Orders    PSA, Screening (Completed)    Hemoglobin A1c between 7.0% and 9.0%        Relevant Orders    Hemoglobin A1C (Completed)    Acute non-recurrent maxillary sinusitis        Relevant Medications    dexAMETHasone injection 2 mg (Completed)    methylPREDNISolone acetate injection 20 mg (Completed)    cefTRIAXone injection 1 g (Completed)          Hyperlipidemia, unspecified hyperlipidemia type  -     Lipid Panel; Future; Expected date: 01/24/2024    Hypertension, unspecified type  -     CBC Auto Differential; Future; Expected date: 01/24/2024  -     Comprehensive Metabolic Panel; Future; Expected date: 01/24/2024    Type 2 diabetes mellitus without complication, with long-term current use of insulin  -     Hemoglobin A1C; Future; Expected date: 01/24/2024    Screening for malignant neoplasm of prostate  -     PSA, Screening; Future; Expected date: 01/24/2024    Hemoglobin A1c between 7.0% and 9.0%  -     Hemoglobin A1C; Future; Expected date: 01/24/2024    Acute non-recurrent maxillary sinusitis  -     dexAMETHasone injection 2 mg  -     methylPREDNISolone acetate injection 20 mg  -     cefTRIAXone injection 1 g    Other orders  -     amLODIPine (NORVASC) 10 MG tablet; Take 1 tablet (10 mg total) by mouth once daily.  Dispense: 90 tablet; Refill: 0  -     liraglutide 0.6 mg/0.1 mL, 18 mg/3 mL, subq PNIJ (VICTOZA 3-LORI) 0.6 mg/0.1 mL (18 mg/3 mL) PnIj pen; Inject 1.8 mg into the skin once daily.  Dispense: 27 mL; Refill: 0  -     losartan-hydrochlorothiazide 100-25 mg (HYZAAR) 100-25 mg per tablet; Take 1 tablet by  mouth once daily.  Dispense: 90 tablet; Refill: 0  -     metFORMIN (GLUCOPHAGE) 1000 MG tablet; Take 1 tablet (1,000 mg total) by mouth 2 (two) times daily with meals.  Dispense: 180 tablet; Refill: 0  -     pioglitazone (ACTOS) 15 MG tablet; Take 1 tablet (15 mg total) by mouth once daily.  Dispense: 90 tablet; Refill: 0  -     insulin glargine, TOUJEO, (TOUJEO SOLOSTAR U-300 INSULIN) 300 unit/mL (1.5 mL) InPn pen; Inject 30 Units into the skin every evening.  Dispense: 3 mL; Refill: 2       Health Maintenance Topics with due status: Not Due       Topic Last Completion Date    Diabetes Urine Screening 10/19/2023    Lipid Panel 01/24/2024    Hemoglobin A1c 01/24/2024       Procedures     Future Appointments   Date Time Provider Department Center   2/6/2024 10:00 AM Daria Boyd FNP [facility] [place]   4/30/2024  8:00 [facility]        No follow-ups on file.       Signature:  Dontrell Shetty MD  [facility]  [address] [facility] [place] [address]  [phone] Phone  [phone] Fax    Date of encounter: 1/24/24

## 2024-01-30 ENCOUNTER — PATIENT OUTREACH (OUTPATIENT)
Dept: FAMILY MEDICINE | Facility: CLINIC | Age: 79
End: 2024-01-30
Payer: MEDICARE

## 2024-01-30 LAB
LEFT EYE DM RETINOPATHY: NEGATIVE
RIGHT EYE DM RETINOPATHY: NEGATIVE

## 2024-02-06 ENCOUNTER — OFFICE VISIT (OUTPATIENT)
Dept: FAMILY MEDICINE | Facility: CLINIC | Age: 79
End: 2024-02-06
Payer: MEDICARE

## 2024-02-06 DIAGNOSIS — Z79.4 TYPE 2 DIABETES MELLITUS WITH STAGE 3A CHRONIC KIDNEY DISEASE, WITH LONG-TERM CURRENT USE OF INSULIN: ICD-10-CM

## 2024-02-06 DIAGNOSIS — N18.31 TYPE 2 DIABETES MELLITUS WITH STAGE 3A CHRONIC KIDNEY DISEASE, WITH LONG-TERM CURRENT USE OF INSULIN: ICD-10-CM

## 2024-02-06 DIAGNOSIS — Z00.00 ENCOUNTER FOR SUBSEQUENT ANNUAL WELLNESS VISIT (AWV) IN MEDICARE PATIENT: Primary | ICD-10-CM

## 2024-02-06 DIAGNOSIS — E78.5 HYPERLIPIDEMIA, UNSPECIFIED HYPERLIPIDEMIA TYPE: ICD-10-CM

## 2024-02-06 DIAGNOSIS — E11.22 TYPE 2 DIABETES MELLITUS WITH STAGE 3A CHRONIC KIDNEY DISEASE, WITH LONG-TERM CURRENT USE OF INSULIN: ICD-10-CM

## 2024-02-06 DIAGNOSIS — I10 HYPERTENSION, UNSPECIFIED TYPE: ICD-10-CM

## 2024-02-06 DIAGNOSIS — N18.31 CHRONIC KIDNEY DISEASE, STAGE 3A: ICD-10-CM

## 2024-02-06 PROCEDURE — 3075F SYST BP GE 130 - 139MM HG: CPT | Mod: ,,, | Performed by: NURSE PRACTITIONER

## 2024-02-06 PROCEDURE — 3078F DIAST BP <80 MM HG: CPT | Mod: ,,, | Performed by: NURSE PRACTITIONER

## 2024-02-06 PROCEDURE — G0439 PPPS, SUBSEQ VISIT: HCPCS | Mod: ,,, | Performed by: NURSE PRACTITIONER

## 2024-02-06 PROCEDURE — 1159F MED LIST DOCD IN RCRD: CPT | Mod: ,,, | Performed by: NURSE PRACTITIONER

## 2024-02-06 PROCEDURE — 1160F RVW MEDS BY RX/DR IN RCRD: CPT | Mod: ,,, | Performed by: NURSE PRACTITIONER

## 2024-03-06 VITALS
SYSTOLIC BLOOD PRESSURE: 136 MMHG | RESPIRATION RATE: 18 BRPM | HEIGHT: 74 IN | TEMPERATURE: 98 F | BODY MASS INDEX: 30.54 KG/M2 | OXYGEN SATURATION: 96 % | DIASTOLIC BLOOD PRESSURE: 76 MMHG | WEIGHT: 238 LBS | HEART RATE: 76 BPM

## 2024-03-06 PROBLEM — Z79.4 TYPE 2 DIABETES MELLITUS WITH STAGE 3A CHRONIC KIDNEY DISEASE, WITH LONG-TERM CURRENT USE OF INSULIN: Status: ACTIVE | Noted: 2024-03-06

## 2024-03-06 PROBLEM — N18.31 TYPE 2 DIABETES MELLITUS WITH STAGE 3A CHRONIC KIDNEY DISEASE, WITH LONG-TERM CURRENT USE OF INSULIN: Status: ACTIVE | Noted: 2024-03-06

## 2024-03-06 PROBLEM — N18.31 CHRONIC KIDNEY DISEASE, STAGE 3A: Status: ACTIVE | Noted: 2024-03-06

## 2024-03-06 PROBLEM — E11.22 TYPE 2 DIABETES MELLITUS WITH STAGE 3A CHRONIC KIDNEY DISEASE, WITH LONG-TERM CURRENT USE OF INSULIN: Status: ACTIVE | Noted: 2024-03-06

## 2024-03-06 PROBLEM — E11.65 TYPE 2 DIABETES MELLITUS WITH HYPERGLYCEMIA, WITH LONG-TERM CURRENT USE OF INSULIN: Status: ACTIVE | Noted: 2023-01-18

## 2024-03-06 NOTE — PROGRESS NOTES
"  Milton Johnson presented for a  Medicare AWV and comprehensive Health Risk Assessment today. The following components were reviewed and updated:    Medical history  Family History  Social history  Allergies and Current Medications  Health Risk Assessment  Health Maintenance  Care Team         ** See Completed Assessments for Annual Wellness Visit within the encounter summary.**         The following assessments were completed:  Living Situation  CAGE  Depression Screening  Timed Get Up and Go  Whisper Test  Cognitive Function Screening  Nutrition Screening  ADL Screening  PAQ Screening        Vitals:    02/06/24 1341   BP: 136/76   BP Location: Right arm   Patient Position: Sitting   BP Method: Large (Automatic)   Pulse: 76   Resp: 18   Temp: 97.8 °F (36.6 °C)   TempSrc: Oral   SpO2: 96%   Weight: 108 kg (238 lb)   Height: 6' 2" (1.88 m)     Body mass index is 30.56 kg/m².            Diagnoses and health risks identified today and associated recommendations/orders:    1. Encounter for subsequent annual wellness visit (AWV) in Medicare patient  Screenings performed as noted above. Personal preventative testing needs reviewed    2. Hypertension, unspecified type  Controlled. Followed by PCP    3. Hyperlipidemia, unspecified hyperlipidemia type  Controlled. Followed by PCP    4. Chronic kidney disease, stage 3a  GFR 55 on 1/24/24    5. Type 2 diabetes mellitus with stage 3a chronic kidney disease, with long-term current use of insulin  A1c 8.8 on 1/24/24    6. BMI 30.0-30.9,adult        Provided Milton with a 5-10 year written screening schedule and personal prevention plan. Recommendations were developed using the USPSTF age appropriate recommendations. Education, counseling, and referrals were provided as needed. After Visit Summary printed and given to patient which includes a list of additional screenings\tests needed.    No follow-ups on file.    SANDRA Stubbs  "

## 2024-03-06 NOTE — PATIENT INSTRUCTIONS
Counseling and Referral of Other Preventative  (Italic type indicates deductible and co-insurance are waived)    Patient Name: Milton Johnson  Today's Date: 3/6/2024    Health Maintenance       Date Due Completion Date    Hepatitis C Screening Never done ---    Pneumococcal Vaccines (Age 65+) (1 of 2 - PCV) Never done ---    TETANUS VACCINE Never done ---    Shingles Vaccine (1 of 2) Never done ---    RSV Vaccine (Age 60+ and Pregnant patients) (1 - 1-dose 60+ series) Never done ---    COVID-19 Vaccine (5 - 2023-24 season) 09/01/2023 11/15/2022    Hemoglobin A1c 04/24/2024 1/24/2024    Eye Exam 10/03/2024 10/3/2023    Override on 9/21/2022: Done    Diabetes Urine Screening 10/19/2024 10/19/2023    Lipid Panel 01/24/2025 1/24/2024        No orders of the defined types were placed in this encounter.      The following information is provided to all patients.  This information is to help you find resources for any of the problems found today that may be affecting your health:                  Living healthy guide: [facility].gov    Understanding Diabetes: www.diabetes.org      Eating healthy: www.cdc.gov/healthyweight      CDC home safety checklist: www.cdc.gov/steadi/patient.html      Agency on Aging: [facility]aging.org    Alcoholics anonymous (AA): www.aa.org      Physical Activity: www.hiwot.nih.gov/tm4mnwl      Tobacco use: [facility] Networksealth.gov

## 2024-04-09 DIAGNOSIS — Z71.89 COMPLEX CARE COORDINATION: ICD-10-CM

## 2024-04-30 ENCOUNTER — OFFICE VISIT (OUTPATIENT)
Dept: FAMILY MEDICINE | Facility: CLINIC | Age: 79
End: 2024-04-30
Payer: MEDICARE

## 2024-04-30 VITALS
HEART RATE: 73 BPM | WEIGHT: 231.19 LBS | SYSTOLIC BLOOD PRESSURE: 118 MMHG | BODY MASS INDEX: 29.67 KG/M2 | DIASTOLIC BLOOD PRESSURE: 64 MMHG | HEIGHT: 74 IN | OXYGEN SATURATION: 96 % | TEMPERATURE: 98 F

## 2024-04-30 DIAGNOSIS — N18.31 TYPE 2 DIABETES MELLITUS WITH STAGE 3A CHRONIC KIDNEY DISEASE, WITH LONG-TERM CURRENT USE OF INSULIN: ICD-10-CM

## 2024-04-30 DIAGNOSIS — R73.9 HEMOGLOBIN A1C BETWEEN 7.0% AND 9.0%: ICD-10-CM

## 2024-04-30 DIAGNOSIS — Z11.59 ENCOUNTER FOR HEPATITIS C SCREENING TEST FOR LOW RISK PATIENT: ICD-10-CM

## 2024-04-30 DIAGNOSIS — Z79.4 TYPE 2 DIABETES MELLITUS WITH STAGE 3A CHRONIC KIDNEY DISEASE, WITH LONG-TERM CURRENT USE OF INSULIN: ICD-10-CM

## 2024-04-30 DIAGNOSIS — E11.22 TYPE 2 DIABETES MELLITUS WITH STAGE 3A CHRONIC KIDNEY DISEASE, WITH LONG-TERM CURRENT USE OF INSULIN: ICD-10-CM

## 2024-04-30 DIAGNOSIS — I10 HYPERTENSION, UNSPECIFIED TYPE: Primary | ICD-10-CM

## 2024-04-30 LAB
ANION GAP SERPL CALCULATED.3IONS-SCNC: 13 MMOL/L (ref 7–16)
BASOPHILS # BLD AUTO: 0.12 K/UL (ref 0–0.2)
BASOPHILS NFR BLD AUTO: 1 % (ref 0–1)
BUN SERPL-MCNC: 20 MG/DL (ref 7–18)
BUN/CREAT SERPL: 15 (ref 6–20)
CALCIUM SERPL-MCNC: 9.8 MG/DL (ref 8.5–10.1)
CHLORIDE SERPL-SCNC: 99 MMOL/L (ref 98–107)
CO2 SERPL-SCNC: 25 MMOL/L (ref 21–32)
CREAT SERPL-MCNC: 1.33 MG/DL (ref 0.7–1.3)
DIFFERENTIAL METHOD BLD: ABNORMAL
EGFR (NO RACE VARIABLE) (RUSH/TITUS): 55 ML/MIN/1.73M2
EOSINOPHIL # BLD AUTO: 0.12 K/UL (ref 0–0.5)
EOSINOPHIL NFR BLD AUTO: 1 % (ref 1–4)
ERYTHROCYTE [DISTWIDTH] IN BLOOD BY AUTOMATED COUNT: 12.6 % (ref 11.5–14.5)
EST. AVERAGE GLUCOSE BLD GHB EST-MCNC: 223 MG/DL
GLUCOSE SERPL-MCNC: 135 MG/DL (ref 74–106)
HBA1C MFR BLD HPLC: 9.4 % (ref 4.5–6.6)
HCT VFR BLD AUTO: 41.5 % (ref 40–54)
HCV AB SER QL: NORMAL
HGB BLD-MCNC: 13.6 G/DL (ref 13.5–18)
IMM GRANULOCYTES # BLD AUTO: 0.07 K/UL (ref 0–0.04)
IMM GRANULOCYTES NFR BLD: 0.6 % (ref 0–0.4)
LYMPHOCYTES # BLD AUTO: 1.79 K/UL (ref 1–4.8)
LYMPHOCYTES NFR BLD AUTO: 15.5 % (ref 27–41)
MCH RBC QN AUTO: 29 PG (ref 27–31)
MCHC RBC AUTO-ENTMCNC: 32.8 G/DL (ref 32–36)
MCV RBC AUTO: 88.5 FL (ref 80–96)
MONOCYTES # BLD AUTO: 0.67 K/UL (ref 0–0.8)
MONOCYTES NFR BLD AUTO: 5.8 % (ref 2–6)
MPC BLD CALC-MCNC: 9.3 FL (ref 9.4–12.4)
NEUTROPHILS # BLD AUTO: 8.8 K/UL (ref 1.8–7.7)
NEUTROPHILS NFR BLD AUTO: 76.1 % (ref 53–65)
NRBC # BLD AUTO: 0 X10E3/UL
NRBC, AUTO (.00): 0 %
PLATELET # BLD AUTO: 370 K/UL (ref 150–400)
POTASSIUM SERPL-SCNC: 4 MMOL/L (ref 3.5–5.1)
RBC # BLD AUTO: 4.69 M/UL (ref 4.6–6.2)
SODIUM SERPL-SCNC: 133 MMOL/L (ref 136–145)
WBC # BLD AUTO: 11.57 K/UL (ref 4.5–11)

## 2024-04-30 PROCEDURE — 85025 COMPLETE CBC W/AUTO DIFF WBC: CPT | Mod: ,,, | Performed by: CLINICAL MEDICAL LABORATORY

## 2024-04-30 PROCEDURE — 1159F MED LIST DOCD IN RCRD: CPT | Mod: ,,, | Performed by: FAMILY MEDICINE

## 2024-04-30 PROCEDURE — 3074F SYST BP LT 130 MM HG: CPT | Mod: ,,, | Performed by: FAMILY MEDICINE

## 2024-04-30 PROCEDURE — 1101F PT FALLS ASSESS-DOCD LE1/YR: CPT | Mod: ,,, | Performed by: FAMILY MEDICINE

## 2024-04-30 PROCEDURE — 99214 OFFICE O/P EST MOD 30 MIN: CPT | Mod: ,,, | Performed by: FAMILY MEDICINE

## 2024-04-30 PROCEDURE — 80048 BASIC METABOLIC PNL TOTAL CA: CPT | Mod: ,,, | Performed by: CLINICAL MEDICAL LABORATORY

## 2024-04-30 PROCEDURE — 3288F FALL RISK ASSESSMENT DOCD: CPT | Mod: ,,, | Performed by: FAMILY MEDICINE

## 2024-04-30 PROCEDURE — 83036 HEMOGLOBIN GLYCOSYLATED A1C: CPT | Mod: ,,, | Performed by: CLINICAL MEDICAL LABORATORY

## 2024-04-30 PROCEDURE — 86803 HEPATITIS C AB TEST: CPT | Mod: ,,, | Performed by: CLINICAL MEDICAL LABORATORY

## 2024-04-30 PROCEDURE — 3078F DIAST BP <80 MM HG: CPT | Mod: ,,, | Performed by: FAMILY MEDICINE

## 2024-04-30 RX ORDER — METFORMIN HYDROCHLORIDE 1000 MG/1
1000 TABLET ORAL 2 TIMES DAILY WITH MEALS
Qty: 180 TABLET | Refills: 0 | Status: ON HOLD | OUTPATIENT
Start: 2024-04-30 | End: 2024-06-07 | Stop reason: HOSPADM

## 2024-04-30 RX ORDER — LIRAGLUTIDE 6 MG/ML
1.8 INJECTION SUBCUTANEOUS DAILY
Qty: 27 ML | Refills: 0 | Status: SHIPPED | OUTPATIENT
Start: 2024-04-30

## 2024-04-30 RX ORDER — PIOGLITAZONEHYDROCHLORIDE 15 MG/1
15 TABLET ORAL DAILY
Qty: 90 TABLET | Refills: 0 | Status: SHIPPED | OUTPATIENT
Start: 2024-04-30 | End: 2025-04-30

## 2024-04-30 RX ORDER — AMLODIPINE BESYLATE 10 MG/1
10 TABLET ORAL DAILY
Qty: 90 TABLET | Refills: 0 | Status: ON HOLD | OUTPATIENT
Start: 2024-04-30 | End: 2024-06-07 | Stop reason: HOSPADM

## 2024-04-30 RX ORDER — LOSARTAN POTASSIUM AND HYDROCHLOROTHIAZIDE 25; 100 MG/1; MG/1
1 TABLET ORAL DAILY
Qty: 90 TABLET | Refills: 0 | Status: SHIPPED | OUTPATIENT
Start: 2024-04-30

## 2024-04-30 RX ORDER — INSULIN GLARGINE 300 [IU]/ML
30 INJECTION, SOLUTION SUBCUTANEOUS NIGHTLY
Qty: 3 ML | Refills: 2 | Status: SHIPPED | OUTPATIENT
Start: 2024-04-30 | End: 2025-04-30

## 2024-04-30 NOTE — PROGRESS NOTES
Dontrell Shetty MD   [facility]  [address] [place] [address]     PATIENT NAME: Milton Johnson  : 1945  DATE: 24  MRN: [id number]      Billing Provider: Dontrell Shetty MD  Level of Service:   Patient PCP Information       Provider PCP Type    Dontrell Shetty MD General            Reason for Visit / Chief Complaint: Diabetes (Check up, labs, refills )         History of Present Illness / Problem Focused Workflow     Milton Johnson presents to the clinic with Diabetes (Check up, labs, refills )     HPI    Review of Systems     Review of Systems   Constitutional:  Negative for activity change, appetite change, fatigue and fever.   HENT:  Negative for nasal congestion, ear pain, hearing loss, sinus pressure/congestion and sore throat.    Respiratory:  Negative for cough, chest tightness and shortness of breath.    Cardiovascular:  Negative for chest pain and palpitations.   Gastrointestinal:  Negative for abdominal pain and fecal incontinence.   Genitourinary:  Negative for bladder incontinence, difficulty urinating and erectile dysfunction.   Musculoskeletal:  Negative for arthralgias.   Integumentary:  Negative for rash.   Neurological:  Negative for dizziness and headaches.        Medical / Social / Family History     Past Medical History:   Diagnosis Date    Diabetes mellitus     Diabetes mellitus, type 2     Glaucoma     High cholesterol     Hypertension        Past Surgical History:   Procedure Laterality Date    CATARACT EXTRACTION      EYE SURGERY         Social History  Milton Johnson  reports that he has never smoked. He has been exposed to tobacco smoke. He has never used smokeless tobacco. He reports current alcohol use of about 1.0 standard drink of alcohol per week. He reports that he does not use drugs.    Family History  Milton Johnson  family history is not on file. He was adopted.    Medications and Allergies     Medications  Current  "Outpatient Medications   Medication Sig Dispense Refill    aspirin 81 MG Chew Take 81 mg by mouth. 2 times per week      bimatoprost (LUMIGAN) 0.03 % ophthalmic drops 1 drop every evening.      carvediloL (COREG) 3.125 MG tablet Take 3.125 mg by mouth 2 (two) times daily with meals.      dorzolamide (TRUSOPT) 2 % ophthalmic solution 1 drop 3 (three) times daily.      pen needle, diabetic 31 gauge x 1/4" Ndle Inject 2 each into the skin 2 (two) times a day. 100 each 3    pravastatin (PRAVACHOL) 40 MG tablet Take 40 mg by mouth once daily.      amLODIPine (NORVASC) 10 MG tablet Take 1 tablet (10 mg total) by mouth once daily. 90 tablet 0    doxycycline (VIBRAMYCIN) 100 MG Cap Take 100 mg by mouth every 12 (twelve) hours. (Patient not taking: Reported on 4/30/2024)      insulin detemir U-100, Levemir, (LEVEMIR FLEXTOUCH U100 INSULIN) 100 unit/mL (3 mL) InPn pen Inject 65 Units into the skin every evening. (Patient not taking: Reported on 4/30/2024) 58.5 mL 0    insulin glargine, TOUJEO, (TOUJEO SOLOSTAR U-300 INSULIN) 300 unit/mL (1.5 mL) InPn pen Inject 30 Units into the skin every evening. 3 mL 2    liraglutide 0.6 mg/0.1 mL, 18 mg/3 mL, subq PNIJ (VICTOZA 3-LORI) 0.6 mg/0.1 mL (18 mg/3 mL) PnIj pen Inject 1.8 mg into the skin once daily. 27 mL 0    losartan-hydrochlorothiazide 100-25 mg (HYZAAR) 100-25 mg per tablet Take 1 tablet by mouth once daily. 90 tablet 0    metFORMIN (GLUCOPHAGE) 1000 MG tablet Take 1 tablet (1,000 mg total) by mouth 2 (two) times daily with meals. 180 tablet 0    pioglitazone (ACTOS) 15 MG tablet Take 1 tablet (15 mg total) by mouth once daily. 90 tablet 0    rosuvastatin (CRESTOR) 40 MG Tab Take 40 mg by mouth every evening. (Patient not taking: Reported on 4/30/2024)       No current facility-administered medications for this visit.       Allergies  Review of patient's allergies indicates:  No Known Allergies    Physical Examination     Vitals:    04/30/24 0756   BP: 118/64   Pulse: 73 "   Temp: 97.8 °F (36.6 °C)     Physical Exam  Constitutional:       General: He is not in acute distress.     Appearance: He is not ill-appearing.   HENT:      Head: Normocephalic and atraumatic.      Right Ear: Tympanic membrane and ear canal normal.      Left Ear: Tympanic membrane and ear canal normal.      Nose: Nose normal. No congestion or rhinorrhea.   Eyes:      Pupils: Pupils are equal, round, and reactive to light.   Cardiovascular:      Rate and Rhythm: Normal rate and regular rhythm.      Pulses: Normal pulses.      Heart sounds: No murmur heard.  Pulmonary:      Effort: No respiratory distress.      Breath sounds: No wheezing, rhonchi or rales.   Abdominal:      General: Bowel sounds are normal.      Palpations: Abdomen is soft.      Tenderness: There is no abdominal tenderness.      Hernia: No hernia is present.   Musculoskeletal:      Cervical back: Normal range of motion and neck supple.   Lymphadenopathy:      Cervical: No cervical adenopathy.   Skin:     General: Skin is warm and dry.   Neurological:      Mental Status: He is alert.   Psychiatric:         Behavior: Behavior normal.         Thought Content: Thought content normal.          Assessment and Plan (including Health Maintenance)   :    Plan:         Health Maintenance Due   Topic Date Due    Hepatitis C Screening  Never done    Pneumococcal Vaccines (Age 65+) (1 of 2 - PCV) Never done    TETANUS VACCINE  Never done    Shingles Vaccine (1 of 2) Never done    RSV Vaccine (Age 60+ and Pregnant patients) (1 - 1-dose 60+ series) Never done    COVID-19 Vaccine (5 - 2023-24 season) 09/01/2023    Hemoglobin A1c  04/24/2024       Problem List Items Addressed This Visit          Cardiac/Vascular    Hypertension - Primary    Relevant Orders    Basic Metabolic Panel    CBC Auto Differential       Endocrine    Type 2 diabetes mellitus with stage 3a chronic kidney disease, with long-term current use of insulin    Relevant Medications    insulin  glargine, TOUJEO, (TOUJEO SOLOSTAR U-300 INSULIN) 300 unit/mL (1.5 mL) InPn pen    liraglutide 0.6 mg/0.1 mL, 18 mg/3 mL, subq PNIJ (VICTOZA 3-LORI) 0.6 mg/0.1 mL (18 mg/3 mL) PnIj pen    metFORMIN (GLUCOPHAGE) 1000 MG tablet    pioglitazone (ACTOS) 15 MG tablet    Other Relevant Orders    Hemoglobin A1C     Other Visit Diagnoses       Hemoglobin A1c between 7.0% and 9.0%        Relevant Orders    Hemoglobin A1C    Encounter for hepatitis C screening test for low risk patient        Relevant Orders    Hepatitis C Antibody          Hypertension, unspecified type  -     Basic Metabolic Panel; Future; Expected date: 04/30/2024  -     CBC Auto Differential; Future; Expected date: 04/30/2024    Type 2 diabetes mellitus with stage 3a chronic kidney disease, with long-term current use of insulin  -     Hemoglobin A1C; Future; Expected date: 04/30/2024    Hemoglobin A1c between 7.0% and 9.0%  -     Hemoglobin A1C; Future; Expected date: 04/30/2024    Encounter for hepatitis C screening test for low risk patient  -     Hepatitis C Antibody; Future; Expected date: 04/30/2024    Other orders  -     amLODIPine (NORVASC) 10 MG tablet; Take 1 tablet (10 mg total) by mouth once daily.  Dispense: 90 tablet; Refill: 0  -     insulin glargine, TOUJEO, (TOUJEO SOLOSTAR U-300 INSULIN) 300 unit/mL (1.5 mL) InPn pen; Inject 30 Units into the skin every evening.  Dispense: 3 mL; Refill: 2  -     liraglutide 0.6 mg/0.1 mL, 18 mg/3 mL, subq PNIJ (VICTOZA 3-LORI) 0.6 mg/0.1 mL (18 mg/3 mL) PnIj pen; Inject 1.8 mg into the skin once daily.  Dispense: 27 mL; Refill: 0  -     losartan-hydrochlorothiazide 100-25 mg (HYZAAR) 100-25 mg per tablet; Take 1 tablet by mouth once daily.  Dispense: 90 tablet; Refill: 0  -     metFORMIN (GLUCOPHAGE) 1000 MG tablet; Take 1 tablet (1,000 mg total) by mouth 2 (two) times daily with meals.  Dispense: 180 tablet; Refill: 0  -     pioglitazone (ACTOS) 15 MG tablet; Take 1 tablet (15 mg total) by mouth once  daily.  Dispense: 90 tablet; Refill: 0       Health Maintenance Topics with due status: Not Due       Topic Last Completion Date    Eye Exam 10/03/2023    Diabetes Urine Screening 10/19/2023    Lipid Panel 01/24/2024       Procedures     No future appointments.     No follow-ups on file.       Signature:  Dontrell Shetty MD  [facility]  [address] [place] [address]  [phone] Phone  [phone] Fax    Date of encounter: 4/30/24

## 2024-05-17 ENCOUNTER — HOSPITAL ENCOUNTER (EMERGENCY)
Facility: HOSPITAL | Age: 79
Discharge: ANOTHER HOSPITAL | End: 2024-05-17
Attending: INTERNAL MEDICINE
Payer: MEDICARE

## 2024-05-17 VITALS
HEIGHT: 73 IN | RESPIRATION RATE: 18 BRPM | WEIGHT: 230 LBS | DIASTOLIC BLOOD PRESSURE: 64 MMHG | HEART RATE: 84 BPM | BODY MASS INDEX: 30.48 KG/M2 | SYSTOLIC BLOOD PRESSURE: 120 MMHG | TEMPERATURE: 98 F | OXYGEN SATURATION: 96 %
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
## 2024-05-17 DIAGNOSIS — S72.101A CLOSED FRACTURE OF TROCHANTER OF RIGHT FEMUR, INITIAL ENCOUNTER: Primary | ICD-10-CM

## 2024-05-17 LAB
ALBUMIN SERPL BCP-MCNC: 3.3 G/DL (ref 3.5–5)
ALBUMIN/GLOB SERPL: 1.1 {RATIO}
ALP SERPL-CCNC: 46 U/L (ref 45–115)
ALT SERPL W P-5'-P-CCNC: 16 U/L (ref 16–61)
ANION GAP SERPL CALCULATED.3IONS-SCNC: 16 MMOL/L (ref 7–16)
AST SERPL W P-5'-P-CCNC: 10 U/L (ref 15–37)
BASOPHILS # BLD AUTO: 0.08 K/UL (ref 0–0.2)
BASOPHILS NFR BLD AUTO: 0.6 % (ref 0–1)
BILIRUB SERPL-MCNC: 0.4 MG/DL (ref ?–1.2)
BUN SERPL-MCNC: 29 MG/DL (ref 7–18)
BUN/CREAT SERPL: 18 (ref 6–20)
CALCIUM SERPL-MCNC: 9.4 MG/DL (ref 8.5–10.1)
CHLORIDE SERPL-SCNC: 95 MMOL/L (ref 98–107)
CO2 SERPL-SCNC: 23 MMOL/L (ref 21–32)
CREAT SERPL-MCNC: 1.6 MG/DL (ref 0.7–1.3)
DIFFERENTIAL METHOD BLD: ABNORMAL
EGFR (NO RACE VARIABLE) (RUSH/TITUS): 44 ML/MIN/1.73M2
EOSINOPHIL # BLD AUTO: 0.14 K/UL (ref 0–0.5)
EOSINOPHIL NFR BLD AUTO: 1 % (ref 1–4)
ERYTHROCYTE [DISTWIDTH] IN BLOOD BY AUTOMATED COUNT: 12.7 % (ref 11.5–14.5)
GLOBULIN SER-MCNC: 3.1 G/DL (ref 2–4)
GLUCOSE SERPL-MCNC: 165 MG/DL (ref 74–106)
HCT VFR BLD AUTO: 38.1 % (ref 40–54)
HGB BLD-MCNC: 13.3 G/DL (ref 13.5–18)
IMM GRANULOCYTES # BLD AUTO: 0.08 K/UL (ref 0–0.04)
IMM GRANULOCYTES NFR BLD: 0.6 % (ref 0–0.4)
INR BLD: 0.95
LYMPHOCYTES # BLD AUTO: 1.31 K/UL (ref 1–4.8)
LYMPHOCYTES NFR BLD AUTO: 9.8 % (ref 27–41)
MCH RBC QN AUTO: 29.6 PG (ref 27–31)
MCHC RBC AUTO-ENTMCNC: 34.9 G/DL (ref 32–36)
MCV RBC AUTO: 84.7 FL (ref 80–96)
MONOCYTES # BLD AUTO: 0.79 K/UL (ref 0–0.8)
MONOCYTES NFR BLD AUTO: 5.9 % (ref 2–6)
MPC BLD CALC-MCNC: 9.4 FL (ref 9.4–12.4)
NEUTROPHILS # BLD AUTO: 10.96 K/UL (ref 1.8–7.7)
NEUTROPHILS NFR BLD AUTO: 82.1 % (ref 53–65)
NRBC # BLD AUTO: 0 X10E3/UL
NRBC, AUTO (.00): 0 %
PLATELET # BLD AUTO: 258 K/UL (ref 150–400)
POTASSIUM SERPL-SCNC: 3.3 MMOL/L (ref 3.5–5.1)
PROT SERPL-MCNC: 6.4 G/DL (ref 6.4–8.2)
PROTHROMBIN TIME: 12.6 SECONDS (ref 11.7–14.7)
RBC # BLD AUTO: 4.5 M/UL (ref 4.6–6.2)
SODIUM SERPL-SCNC: 131 MMOL/L (ref 136–145)
WBC # BLD AUTO: 13.36 K/UL (ref 4.5–11)

## 2024-05-17 PROCEDURE — 63600175 PHARM REV CODE 636 W HCPCS: Performed by: INTERNAL MEDICINE

## 2024-05-17 PROCEDURE — 25000003 PHARM REV CODE 250: Performed by: INTERNAL MEDICINE

## 2024-05-17 PROCEDURE — 99285 EMERGENCY DEPT VISIT HI MDM: CPT | Performed by: INTERNAL MEDICINE

## 2024-05-17 PROCEDURE — 99285 EMERGENCY DEPT VISIT HI MDM: CPT | Mod: 25

## 2024-05-17 PROCEDURE — 36415 COLL VENOUS BLD VENIPUNCTURE: CPT | Performed by: INTERNAL MEDICINE

## 2024-05-17 PROCEDURE — 85025 COMPLETE CBC W/AUTO DIFF WBC: CPT | Performed by: INTERNAL MEDICINE

## 2024-05-17 PROCEDURE — 85610 PROTHROMBIN TIME: CPT | Performed by: INTERNAL MEDICINE

## 2024-05-17 PROCEDURE — 80053 COMPREHEN METABOLIC PANEL: CPT | Performed by: INTERNAL MEDICINE

## 2024-05-17 RX ORDER — PROCHLORPERAZINE EDISYLATE 5 MG/ML
5 INJECTION INTRAMUSCULAR; INTRAVENOUS
Status: COMPLETED | OUTPATIENT
Start: 2024-05-17 | End: 2024-05-17

## 2024-05-17 RX ORDER — HYDROMORPHONE HYDROCHLORIDE 1 MG/ML
1 INJECTION, SOLUTION INTRAMUSCULAR; INTRAVENOUS; SUBCUTANEOUS
Status: COMPLETED | OUTPATIENT
Start: 2024-05-17 | End: 2024-05-17

## 2024-05-17 RX ORDER — SODIUM CHLORIDE 9 MG/ML
1000 INJECTION, SOLUTION INTRAVENOUS
Status: COMPLETED | OUTPATIENT
Start: 2024-05-17 | End: 2024-05-17

## 2024-05-17 RX ADMIN — HYDROMORPHONE HYDROCHLORIDE 1 MG: 1 INJECTION, SOLUTION INTRAMUSCULAR; INTRAVENOUS; SUBCUTANEOUS at 07:05

## 2024-05-17 RX ADMIN — HYDROMORPHONE HYDROCHLORIDE 1 MG: 1 INJECTION, SOLUTION INTRAMUSCULAR; INTRAVENOUS; SUBCUTANEOUS at 03:05

## 2024-05-17 RX ADMIN — SODIUM CHLORIDE 1000 ML: 9 INJECTION, SOLUTION INTRAVENOUS at 03:05

## 2024-05-17 RX ADMIN — PROCHLORPERAZINE EDISYLATE 5 MG: 5 INJECTION INTRAMUSCULAR; INTRAVENOUS at 07:05

## 2024-05-17 NOTE — ED NOTES
[facility] CONNECTED DR. SHEETS WITH DR. RAJAN (ORTHO) AND DR. VICTORIA (HOSPITALIST) @ [facility]. BOTH AGREE WITH TRANSFER. DR. VICTORIA ACCEPTING.

## 2024-05-17 NOTE — ED NOTES
Message from [facility] stating:   Patient accepted by Dr. Feng Tompkins (hospitalist) and Ortho Dr. Jules Herrera will consult.   Bed assignment: Rm 711, [phone]  Please send facesheet and copy of chart and scans on a disc with the patient

## 2024-05-17 NOTE — ED NOTES
RESTING WITH EYES CLOSED. RESP EVEN AND NON LABORED. VSS. CONTINUES TO AWAIT TRANSPORT TO [facility] PER [facility]. FAMILY AT .

## 2024-05-17 NOTE — ED NOTES
DISPATCH NOTIFIED OF DESTINATION CHANGE FROM [facility] TO [facility] RM N112.

## 2024-05-17 NOTE — ED NOTES
Secure message from [facility]:   Bed assignment: N.112, [phone]  [address] [place] [address]   Accepting MD is Dr. Luc Ayala

## 2024-05-17 NOTE — ED PROVIDER NOTES
Encounter Date: 5/17/2024       History     Chief Complaint   Patient presents with    Fall      Patient going to go to the bathroom and fell down hit his right hip.  Brought in by EMS with shortness of the right lower leg and pain when he movement.  He denies any other injuries including any head trauma loss of consciousness, chest pain shortness breath low back pain.  No incontinence urine or bowel.        Review of patient's allergies indicates:  No Known Allergies  Past Medical History:   Diagnosis Date    Diabetes mellitus     Diabetes mellitus, type 2     Glaucoma     High cholesterol     Hypertension      Past Surgical History:   Procedure Laterality Date    CATARACT EXTRACTION      EYE SURGERY       Family History   Adopted: Yes     Social History     Tobacco Use    Smoking status: Never     Passive exposure: Past (father)    Smokeless tobacco: Never   Substance Use Topics    Alcohol use: Yes     Alcohol/week: 1.0 standard drink of alcohol     Types: 1 Cans of beer per week    Drug use: Never     Review of Systems   Constitutional:  Negative for fever.   HENT:  Negative for sore throat.    Respiratory:  Negative for shortness of breath.    Cardiovascular:  Negative for chest pain.   Gastrointestinal:  Negative for nausea.   Genitourinary:  Negative for dysuria.   Musculoskeletal:  Negative for back pain.   Skin:  Negative for rash.   Neurological:  Negative for weakness.   Hematological:  Does not bruise/bleed easily.       Physical Exam     Initial Vitals [05/17/24 0219]   BP Pulse Resp Temp SpO2   117/75 74 20 97.8 °F (36.6 °C) 98 %      MAP       --         Physical Exam    Nursing note and vitals reviewed.  Constitutional: He appears well-developed.   HENT:   Head: Normocephalic.   Eyes: Pupils are equal, round, and reactive to light.   Neck:   Normal range of motion.  Cardiovascular:  Normal rate, regular rhythm, normal heart sounds and normal pulses.           Pulmonary/Chest: Effort normal and breath  sounds normal.   Abdominal: Abdomen is soft and protuberant. Bowel sounds are normal. There is no abdominal tenderness.   Musculoskeletal:      Cervical back: Normal and normal range of motion.      Thoracic back: Normal.      Lumbar back: Normal.      Right hip: Deformity, tenderness and bony tenderness present. Decreased range of motion.      Left hip: Normal.        Legs:       Comments:   Tenderness in the right lateral hip area without any ecchymoses or bruises, neurovascular motor function distally is normal     Neurological: He is alert. He has normal strength and normal reflexes. GCS eye subscore is 4. GCS verbal subscore is 5. GCS motor subscore is 6.   Skin: Skin is warm.         Medical Screening Exam   See Full Note    ED Course   Procedures  Labs Reviewed   COMPREHENSIVE METABOLIC PANEL - Abnormal; Notable for the following components:       Result Value    Sodium 131 (*)     Potassium 3.3 (*)     Chloride 95 (*)     Glucose 165 (*)     BUN 29 (*)     Creatinine 1.60 (*)     Albumin 3.3 (*)     AST 10 (*)     eGFR 44 (*)     All other components within normal limits   CBC WITH DIFFERENTIAL - Abnormal; Notable for the following components:    WBC 13.36 (*)     RBC 4.50 (*)     Hemoglobin 13.3 (*)     Hematocrit 38.1 (*)     Neutrophils % 82.1 (*)     Lymphocytes % 9.8 (*)     Immature Granulocytes % 0.6 (*)     Neutrophils, Abs 10.96 (*)     Immature Granulocytes, Absolute 0.08 (*)     All other components within normal limits   PROTIME-INR - Normal   CBC W/ AUTO DIFFERENTIAL    Narrative:     The following orders were created for panel order CBC auto differential.  Procedure                               Abnormality         Status                     ---------                               -----------         ------                     CBC with Differential[[id number]]       Abnormal            Final result                 Please view results for these tests on the individual orders.          Imaging  Results              CT Hip Without Contrast Right (Final result)  Result time 05/17/24 07:50:36      Final result by Yannick Beard II, MD (05/17/24 07:50:36)                   Impression:      Intertrochanteric fracture right hip as described above.      Electronically signed by: Yannick Beard  Date:    05/17/2024  Time:    07:50               Narrative:    EXAMINATION:  CT HIP WITHOUT CONTRAST RIGHT    CLINICAL HISTORY:  trauma;    TECHNIQUE:  Axial CT imaging of the right hip is performed without contrast.  Computer reformatting is viewed in the sagittal and coronal plane.    CT dose reduction technique used - Dose Rite and tube current modulation.    COMPARISON:  None available    FINDINGS:  There is intertrochanteric fracture with comminution of the right hip.  The fragments are slightly displaced.  No other evidence of fracture is seen. The alignment appears within normal limits.    Muscles, tendons and ligaments appear within normal limits for CT.    No abnormal fluid collection or abnormal soft tissue density is identified.    No other abnormalities are identified.                                       X-Ray Hip 2 or 3 views Right (with Pelvis when performed) (Final result)  Result time 05/17/24 07:48:00      Final result by Yannick Beard II, MD (05/17/24 07:48:00)                   Impression:      Intertrochanteric fracture right hip.      Electronically signed by: Yannick Beard  Date:    05/17/2024  Time:    07:48               Narrative:    EXAMINATION:  XR HIP WITH PELVIS WHEN PERFORMED, 2 OR 3  VIEWS RIGHT    CLINICAL HISTORY:  Fall (on) (from) other stairs and steps, initial encounter    COMPARISON:  None available    TECHNIQUE:  XR HIP WITH PELVIS 3  VIEWS RIGHT    FINDINGS:  There is intertrochanteric fracture right hip with slight angulation and impaction.  The alignment of the joints appears normal.  No degenerative change is present.  No soft tissue abnormality is seen.                                        Medications   0.9%  NaCl infusion ( Intravenous Stopped 5/17/24 0748)   HYDROmorphone injection 1 mg (1 mg Intravenous Given 5/17/24 0311)   HYDROmorphone injection 1 mg (1 mg Intravenous Given 5/17/24 0725)   prochlorperazine injection Soln 5 mg (5 mg Intravenous Given 5/17/24 0724)     Medical Decision Making   Patient fell at home right hip pain rule out fracture dislocation.    Amount and/or Complexity of Data Reviewed  Labs: ordered.  Radiology: ordered.  Discussion of management or test interpretation with external provider(s):  Patient with a trochanteric fracture of the right hip, would need orthopedic surgery.  Will get CT scan for completeness.    Risk  Prescription drug management.               ED Course as of 05/17/24 1835   Fri May 17, 2024   0306 PATIENT ACCEPTED AT [facility] [place], NO BEDS IN [place] [PW]      ED Course User Index  [PW] Ion Hanson MD                           Clinical Impression:   Final diagnoses:  [W10.8XXA] Fall (on) (from) other stairs and steps, initial encounter  [S72.101A] Closed fracture of trochanter of right femur, initial encounter (Primary)        ED Disposition Condition    Transfer to Another Facility Stable                Ion Hanson MD  05/17/24 1835

## 2024-05-17 NOTE — ED NOTES
Pt awake and alert in bed- pt c/o of hip pain - dr alba notified of need for pain medication- orders received- sherry with ems called and spoke with valdemar aleman rn- states should be here by 8:15

## 2024-05-20 NOTE — ADDENDUM NOTE
Encounter addended by: Julieth Richardson, PharmD on: 5/20/2024 9:12 AM   Actions taken: Order Reconciliation Section accessed

## 2024-05-21 ENCOUNTER — HOSPITAL ENCOUNTER (INPATIENT)
Facility: HOSPITAL | Age: 79
LOS: 17 days | Discharge: HOME | DRG: 560 | End: 2024-06-07
Attending: FAMILY MEDICINE | Admitting: FAMILY MEDICINE
Payer: MEDICARE

## 2024-05-21 DIAGNOSIS — R53.1 GENERALIZED WEAKNESS: ICD-10-CM

## 2024-05-21 DIAGNOSIS — H40.9 GLAUCOMA, UNSPECIFIED GLAUCOMA TYPE, UNSPECIFIED LATERALITY: ICD-10-CM

## 2024-05-21 DIAGNOSIS — Z79.4 TYPE 2 DIABETES MELLITUS WITH HYPERGLYCEMIA, WITH LONG-TERM CURRENT USE OF INSULIN: ICD-10-CM

## 2024-05-21 DIAGNOSIS — A49.9 URINARY TRACT BACTERIAL INFECTIONS: ICD-10-CM

## 2024-05-21 DIAGNOSIS — I10 PRIMARY HYPERTENSION: Primary | ICD-10-CM

## 2024-05-21 DIAGNOSIS — E11.65 TYPE 2 DIABETES MELLITUS WITH HYPERGLYCEMIA, WITH LONG-TERM CURRENT USE OF INSULIN: ICD-10-CM

## 2024-05-21 DIAGNOSIS — M62.81 MUSCULAR WEAKNESS: ICD-10-CM

## 2024-05-21 DIAGNOSIS — N39.0 URINARY TRACT BACTERIAL INFECTIONS: ICD-10-CM

## 2024-05-21 LAB
ANION GAP SERPL CALCULATED.3IONS-SCNC: 12 MMOL/L (ref 7–16)
BACTERIA #/AREA URNS HPF: ABNORMAL /HPF
BASOPHILS # BLD AUTO: 0.05 K/UL (ref 0–0.2)
BASOPHILS NFR BLD AUTO: 0.3 % (ref 0–1)
BILIRUB UR QL STRIP: NEGATIVE
BUN SERPL-MCNC: 19 MG/DL (ref 7–18)
BUN/CREAT SERPL: 13 (ref 6–20)
CALCIUM SERPL-MCNC: 9.5 MG/DL (ref 8.5–10.1)
CHLORIDE SERPL-SCNC: 88 MMOL/L (ref 98–107)
CLARITY UR: ABNORMAL
CO2 SERPL-SCNC: 27 MMOL/L (ref 21–32)
COLOR UR: YELLOW
CREAT SERPL-MCNC: 1.47 MG/DL (ref 0.7–1.3)
DIFFERENTIAL METHOD BLD: ABNORMAL
EGFR (NO RACE VARIABLE) (RUSH/TITUS): 49 ML/MIN/1.73M2
EOSINOPHIL # BLD AUTO: 0.21 K/UL (ref 0–0.5)
EOSINOPHIL NFR BLD AUTO: 1.5 % (ref 1–4)
ERYTHROCYTE [DISTWIDTH] IN BLOOD BY AUTOMATED COUNT: 12.5 % (ref 11.5–14.5)
GLUCOSE SERPL-MCNC: 197 MG/DL (ref 70–105)
GLUCOSE SERPL-MCNC: 229 MG/DL (ref 74–106)
GLUCOSE SERPL-MCNC: 258 MG/DL (ref 70–105)
GLUCOSE UR STRIP-MCNC: 250 MG/DL
HCT VFR BLD AUTO: 33.4 % (ref 40–54)
HGB BLD-MCNC: 11.8 G/DL (ref 13.5–18)
IMM GRANULOCYTES # BLD AUTO: 0.09 K/UL (ref 0–0.04)
IMM GRANULOCYTES NFR BLD: 0.6 % (ref 0–0.4)
KETONES UR STRIP-SCNC: NEGATIVE MG/DL
LEUKOCYTE ESTERASE UR QL STRIP: ABNORMAL
LYMPHOCYTES # BLD AUTO: 1.21 K/UL (ref 1–4.8)
LYMPHOCYTES NFR BLD AUTO: 8.4 % (ref 27–41)
MCH RBC QN AUTO: 29.7 PG (ref 27–31)
MCHC RBC AUTO-ENTMCNC: 35.3 G/DL (ref 32–36)
MCV RBC AUTO: 84.1 FL (ref 80–96)
MONOCYTES # BLD AUTO: 0.97 K/UL (ref 0–0.8)
MONOCYTES NFR BLD AUTO: 6.8 % (ref 2–6)
MPC BLD CALC-MCNC: 8.7 FL (ref 9.4–12.4)
NEUTROPHILS # BLD AUTO: 11.79 K/UL (ref 1.8–7.7)
NEUTROPHILS NFR BLD AUTO: 82.4 % (ref 53–65)
NITRITE UR QL STRIP: NEGATIVE
NRBC # BLD AUTO: 0 X10E3/UL
NRBC, AUTO (.00): 0 %
PH UR STRIP: 6.5 PH UNITS
PLATELET # BLD AUTO: 230 K/UL (ref 150–400)
POTASSIUM SERPL-SCNC: 3.1 MMOL/L (ref 3.5–5.1)
PROT UR QL STRIP: 100
RBC # BLD AUTO: 3.97 M/UL (ref 4.6–6.2)
RBC # UR STRIP: ABNORMAL /UL
RBC #/AREA URNS HPF: ABNORMAL /HPF
SODIUM SERPL-SCNC: 124 MMOL/L (ref 136–145)
SP GR UR STRIP: 1.02
SQUAMOUS #/AREA URNS LPF: ABNORMAL /LPF
UROBILINOGEN UR STRIP-ACNC: 0.2 MG/DL
WBC # BLD AUTO: 14.32 K/UL (ref 4.5–11)
WBC #/AREA URNS HPF: ABNORMAL /HPF

## 2024-05-21 PROCEDURE — 82962 GLUCOSE BLOOD TEST: CPT

## 2024-05-21 PROCEDURE — 36415 COLL VENOUS BLD VENIPUNCTURE: CPT | Performed by: FAMILY MEDICINE

## 2024-05-21 PROCEDURE — 94761 N-INVAS EAR/PLS OXIMETRY MLT: CPT

## 2024-05-21 PROCEDURE — 97161 PT EVAL LOW COMPLEX 20 MIN: CPT

## 2024-05-21 PROCEDURE — 81001 URINALYSIS AUTO W/SCOPE: CPT | Performed by: FAMILY MEDICINE

## 2024-05-21 PROCEDURE — 63600175 PHARM REV CODE 636 W HCPCS: Performed by: FAMILY MEDICINE

## 2024-05-21 PROCEDURE — 80048 BASIC METABOLIC PNL TOTAL CA: CPT | Performed by: FAMILY MEDICINE

## 2024-05-21 PROCEDURE — 25000003 PHARM REV CODE 250: Performed by: FAMILY MEDICINE

## 2024-05-21 PROCEDURE — 85025 COMPLETE CBC W/AUTO DIFF WBC: CPT | Performed by: FAMILY MEDICINE

## 2024-05-21 PROCEDURE — 11000004 HC SNF PRIVATE

## 2024-05-21 PROCEDURE — 97165 OT EVAL LOW COMPLEX 30 MIN: CPT

## 2024-05-21 RX ORDER — ACETAMINOPHEN AND PHENYLEPHRINE HCL 325; 5 MG/1; MG/1
5000 TABLET ORAL DAILY
COMMUNITY

## 2024-05-21 RX ORDER — HYDROCHLOROTHIAZIDE 25 MG/1
25 TABLET ORAL DAILY
Status: DISCONTINUED | OUTPATIENT
Start: 2024-05-22 | End: 2024-06-07 | Stop reason: HOSPADM

## 2024-05-21 RX ORDER — OXYCODONE AND ACETAMINOPHEN 10; 325 MG/1; MG/1
1 TABLET ORAL EVERY 6 HOURS PRN
Status: DISCONTINUED | OUTPATIENT
Start: 2024-05-21 | End: 2024-06-07 | Stop reason: HOSPADM

## 2024-05-21 RX ORDER — ACETAMINOPHEN 325 MG/1
650 TABLET ORAL EVERY 6 HOURS PRN
Status: DISCONTINUED | OUTPATIENT
Start: 2024-05-21 | End: 2024-06-07 | Stop reason: HOSPADM

## 2024-05-21 RX ORDER — TALC
6 POWDER (GRAM) TOPICAL NIGHTLY PRN
Status: DISCONTINUED | OUTPATIENT
Start: 2024-05-21 | End: 2024-06-07 | Stop reason: HOSPADM

## 2024-05-21 RX ORDER — ACETAMINOPHEN 500 MG
5000 TABLET ORAL DAILY
Status: DISCONTINUED | OUTPATIENT
Start: 2024-05-22 | End: 2024-06-07 | Stop reason: HOSPADM

## 2024-05-21 RX ORDER — LOSARTAN POTASSIUM 100 MG/1
100 TABLET ORAL DAILY
Status: DISCONTINUED | OUTPATIENT
Start: 2024-05-22 | End: 2024-06-07 | Stop reason: HOSPADM

## 2024-05-21 RX ORDER — DORZOLAMIDE HCL 20 MG/ML
1 SOLUTION/ DROPS OPHTHALMIC 2 TIMES DAILY
Status: DISCONTINUED | OUTPATIENT
Start: 2024-05-21 | End: 2024-05-21

## 2024-05-21 RX ORDER — AMLODIPINE BESYLATE 10 MG/1
10 TABLET ORAL DAILY
Status: DISCONTINUED | OUTPATIENT
Start: 2024-05-22 | End: 2024-06-04

## 2024-05-21 RX ORDER — POTASSIUM CHLORIDE 20 MEQ/1
20 TABLET, EXTENDED RELEASE ORAL 2 TIMES DAILY
Status: DISCONTINUED | OUTPATIENT
Start: 2024-05-21 | End: 2024-06-07 | Stop reason: HOSPADM

## 2024-05-21 RX ORDER — LOSARTAN POTASSIUM AND HYDROCHLOROTHIAZIDE 25; 100 MG/1; MG/1
1 TABLET ORAL DAILY
Status: DISCONTINUED | OUTPATIENT
Start: 2024-05-22 | End: 2024-05-21

## 2024-05-21 RX ORDER — PIOGLITAZONEHYDROCHLORIDE 15 MG/1
15 TABLET ORAL DAILY
Status: DISCONTINUED | OUTPATIENT
Start: 2024-05-22 | End: 2024-06-07 | Stop reason: HOSPADM

## 2024-05-21 RX ORDER — CALCIUM CARBONATE 200(500)MG
500 TABLET,CHEWABLE ORAL 2 TIMES DAILY PRN
Status: DISCONTINUED | OUTPATIENT
Start: 2024-05-21 | End: 2024-06-07 | Stop reason: HOSPADM

## 2024-05-21 RX ORDER — ATORVASTATIN CALCIUM 40 MG/1
80 TABLET, FILM COATED ORAL NIGHTLY
Status: DISCONTINUED | OUTPATIENT
Start: 2024-05-21 | End: 2024-06-07 | Stop reason: HOSPADM

## 2024-05-21 RX ORDER — INSULIN GLARGINE 100 [IU]/ML
30 INJECTION, SOLUTION SUBCUTANEOUS NIGHTLY
Status: DISCONTINUED | OUTPATIENT
Start: 2024-05-21 | End: 2024-05-21 | Stop reason: ALTCHOICE

## 2024-05-21 RX ORDER — CARVEDILOL 3.12 MG/1
3.12 TABLET ORAL 2 TIMES DAILY WITH MEALS
Status: DISCONTINUED | OUTPATIENT
Start: 2024-05-21 | End: 2024-06-07 | Stop reason: HOSPADM

## 2024-05-21 RX ORDER — ASPIRIN 81 MG/1
81 TABLET ORAL 2 TIMES DAILY
Status: DISCONTINUED | OUTPATIENT
Start: 2024-05-21 | End: 2024-06-07 | Stop reason: HOSPADM

## 2024-05-21 RX ORDER — DORZOLAMIDE HCL 20 MG/ML
1 SOLUTION/ DROPS OPHTHALMIC 2 TIMES DAILY
Status: DISCONTINUED | OUTPATIENT
Start: 2024-05-21 | End: 2024-06-07 | Stop reason: HOSPADM

## 2024-05-21 RX ORDER — AMOXICILLIN 250 MG
1 CAPSULE ORAL 2 TIMES DAILY
Status: DISCONTINUED | OUTPATIENT
Start: 2024-05-21 | End: 2024-06-07 | Stop reason: HOSPADM

## 2024-05-21 RX ORDER — NITROFURANTOIN 25; 75 MG/1; MG/1
100 CAPSULE ORAL EVERY 12 HOURS
Status: DISCONTINUED | OUTPATIENT
Start: 2024-05-21 | End: 2024-05-24 | Stop reason: ALTCHOICE

## 2024-05-21 RX ADMIN — POTASSIUM CHLORIDE 20 MEQ: 1500 TABLET, EXTENDED RELEASE ORAL at 09:05

## 2024-05-21 RX ADMIN — ASPIRIN 81 MG: 81 TABLET, COATED ORAL at 09:05

## 2024-05-21 RX ADMIN — NITROFURANTOIN MONOHYDRATE/MACROCRYSTALS 100 MG: 25; 75 CAPSULE ORAL at 09:05

## 2024-05-21 RX ADMIN — CARVEDILOL 3.12 MG: 3.12 TABLET, FILM COATED ORAL at 04:05

## 2024-05-21 RX ADMIN — ATORVASTATIN CALCIUM 80 MG: 40 TABLET, FILM COATED ORAL at 09:05

## 2024-05-21 RX ADMIN — BIMATOPROST 1 DROP: 0.1 SOLUTION/ DROPS OPHTHALMIC at 09:05

## 2024-05-21 RX ADMIN — INSULIN DETEMIR 30 UNITS: 100 INJECTION, SOLUTION SUBCUTANEOUS at 09:05

## 2024-05-21 RX ADMIN — SENNOSIDES AND DOCUSATE SODIUM 1 TABLET: 8.6; 5 TABLET ORAL at 09:05

## 2024-05-21 NOTE — H&P
[facility] Medicine  History & Physical    Patient Name: Milton Johnson  MRN: [id number]  Patient Class: IP- Swing  Admission Date: 5/21/2024  Attending Physician: Valentina Walker,*   Primary Care Provider: Dontrell Shetty MD         Patient information was obtained from patient and ER records.     Subjective:     Principal Problem:Muscular weakness    Chief Complaint: No chief complaint on file.       HPI: This 78 yr. Old WM fell at home and fractured his right hip. He underwent an ORIF of said fracture. He is here for therapy after surgery. He has hypertension, diabetes, hyperlipidemia, and hyponatremia. He was found to have a UTI while there. Culture is pending.    Past Medical History:   Diagnosis Date    Diabetes mellitus     Diabetes mellitus, type 2     Glaucoma     High cholesterol     Hypertension        Past Surgical History:   Procedure Laterality Date    CATARACT EXTRACTION      EYE SURGERY         Review of patient's allergies indicates:  No Known Allergies    No current facility-administered medications on file prior to encounter.     Current Outpatient Medications on File Prior to Encounter   Medication Sig    amLODIPine (NORVASC) 10 MG tablet Take 1 tablet (10 mg total) by mouth once daily.    aspirin (ECOTRIN) 81 MG EC tablet Take 81 mg by mouth 2 (two) times a day. For 30 days    bimatoprost (LUMIGAN) 0.01 % Drop Place 1 drop into both eyes every evening.    carvediloL (COREG) 3.125 MG tablet Take 3.125 mg by mouth 2 (two) times daily with meals.    cholecalciferol, vitamin D3, 125 mcg (5,000 unit) TbDL Take 5,000 Units by mouth once daily.    dorzolamide (TRUSOPT) 2 % ophthalmic solution Place 1 drop into both eyes 2 (two) times a day.    insulin glargine, TOUJEO, (TOUJEO SOLOSTAR U-300 INSULIN) 300 unit/mL (1.5 mL) InPn pen Inject 30 Units into the skin every evening.    liraglutide 0.6 mg/0.1 mL, 18 mg/3 mL, subq PNIJ (VICTOZA 3-LORI)  "0.6 mg/0.1 mL (18 mg/3 mL) PnIj pen Inject 1.8 mg into the skin once daily.    losartan-hydrochlorothiazide 100-25 mg (HYZAAR) 100-25 mg per tablet Take 1 tablet by mouth once daily.    metFORMIN (GLUCOPHAGE) 1000 MG tablet Take 1 tablet (1,000 mg total) by mouth 2 (two) times daily with meals.    pen needle, diabetic 31 gauge x 1/4" Ndle Inject 2 each into the skin 2 (two) times a day.    pioglitazone (ACTOS) 15 MG tablet Take 1 tablet (15 mg total) by mouth once daily.    rosuvastatin (CRESTOR) 40 MG Tab Take 40 mg by mouth every evening.    [DISCONTINUED] insulin detemir U-100, Levemir, (LEVEMIR FLEXTOUCH U100 INSULIN) 100 unit/mL (3 mL) InPn pen Inject 65 Units into the skin every evening. (Patient not taking: Reported on 4/30/2024)    [DISCONTINUED] pravastatin (PRAVACHOL) 40 MG tablet Take 40 mg by mouth once daily.     Family History    None       Tobacco Use    Smoking status: Never     Passive exposure: Past (father)    Smokeless tobacco: Never   Substance and Sexual Activity    Alcohol use: Yes     Alcohol/week: 1.0 standard drink of alcohol     Types: 1 Cans of beer per week    Drug use: Never    Sexual activity: Not Currently     Review of Systems   Constitutional:  Negative for fatigue and fever.   HENT:  Negative for congestion, postnasal drip, rhinorrhea and sinus pressure.    Eyes:  Negative for visual disturbance.   Respiratory:  Negative for cough.    Cardiovascular:  Negative for chest pain.   Gastrointestinal:  Negative for abdominal distention and abdominal pain.   Endocrine: Negative for cold intolerance and heat intolerance.   Genitourinary:  Negative for flank pain.   Musculoskeletal:  Positive for arthralgias. Negative for gait problem.   Skin:  Negative for color change.   Allergic/Immunologic: Negative for food allergies.   Neurological:  Positive for weakness. Negative for dizziness, seizures and headaches.   Hematological:  Negative for adenopathy. Does not bruise/bleed easily. "   Psychiatric/Behavioral:  Negative for behavioral problems. The patient is not nervous/anxious.      Objective:     Vital Signs (Most Recent):  Temp: 97.8 °F (36.6 °C) (05/21/24 1354)  Pulse: 78 (05/21/24 1358)  Resp: 18 (05/21/24 1358)  BP: (!) 148/78 (05/21/24 1354)  SpO2: 98 % (05/21/24 1358) Vital Signs (24h Range):  Temp:  [97.8 °F (36.6 °C)] 97.8 °F (36.6 °C)  Pulse:  [75-78] 78  Resp:  [18-20] 18  SpO2:  [96 %-98 %] 98 %  BP: (148)/(78) 148/78     Weight: 105 kg (231 lb 7.7 oz)  Body mass index is 30.54 kg/m².     Physical Exam  Vitals and nursing note reviewed. Exam conducted with a chaperone present.   Constitutional:       Appearance: Normal appearance. He is normal weight.   HENT:      Head: Normocephalic and atraumatic.      Right Ear: Tympanic membrane normal.      Left Ear: Tympanic membrane normal.      Nose: Nose normal.      Mouth/Throat:      Mouth: Mucous membranes are moist.   Eyes:      Extraocular Movements: Extraocular movements intact.      Conjunctiva/sclera: Conjunctivae normal.      Pupils: Pupils are equal, round, and reactive to light.   Cardiovascular:      Rate and Rhythm: Normal rate and regular rhythm.      Pulses: Normal pulses.   Pulmonary:      Effort: Pulmonary effort is normal.      Breath sounds: Normal breath sounds.   Abdominal:      General: Abdomen is flat. Bowel sounds are normal.      Palpations: Abdomen is soft.   Musculoskeletal:         General: Normal range of motion.      Cervical back: Normal range of motion and neck supple.      Comments: Recent ORIF of right hip   Skin:     General: Skin is warm and dry.   Neurological:      General: No focal deficit present.      Mental Status: He is alert and oriented to person, place, and time.   Psychiatric:         Mood and Affect: Mood normal.              CRANIAL NERVES     CN III, IV, VI   Pupils are equal, round, and reactive to light.       Significant Labs: All pertinent labs within the past 24 hours have been  reviewed.    Significant Imaging: I have reviewed all pertinent imaging results/findings within the past 24 hours.  Assessment/Plan:     No notes have been filed under this hospital service.  Service: Hospital Medicine    VTE Risk Mitigation (From admission, onward)           Ordered     IP VTE LOW RISK PATIENT  Once         05/21/24 1355     Place ESTEFANIA hose  Until discontinued         05/21/24 1355                                    Valentina Walker MD  Department of Hospital Medicine  [facility]

## 2024-05-21 NOTE — PLAN OF CARE
Problem: Adult Inpatient Plan of Care  Goal: Plan of Care Review  5/21/2024 1739 by Karol Jackson RN  Outcome: Progressing  5/21/2024 1356 by Karol Jackson RN  Outcome: Progressing  Goal: Patient-Specific Goal (Individualized)  5/21/2024 1739 by Karol Jackson RN  Outcome: Progressing  5/21/2024 1356 by Karol Jackson RN  Outcome: Progressing  Goal: Absence of Hospital-Acquired Illness or Injury  5/21/2024 1739 by Karol Jackson RN  Outcome: Progressing  5/21/2024 1356 by Karol Jackson RN  Outcome: Progressing  Goal: Optimal Comfort and Wellbeing  5/21/2024 1739 by Karol Jackson RN  Outcome: Progressing  5/21/2024 1356 by Karol Jackson RN  Outcome: Progressing  Goal: Readiness for Transition of Care  5/21/2024 1739 by Karol Jackson RN  Outcome: Progressing  5/21/2024 1356 by Karol Jackson RN  Outcome: Progressing     Problem: Diabetes Comorbidity  Goal: Blood Glucose Level Within Targeted Range  5/21/2024 1739 by Karol Jackson RN  Outcome: Progressing  5/21/2024 1356 by Karol Jackson RN  Outcome: Progressing     Problem: Pain Acute  Goal: Optimal Pain Control and Function  Outcome: Progressing     Problem: Fall Injury Risk  Goal: Absence of Fall and Fall-Related Injury  Outcome: Progressing     Problem: Infection  Goal: Absence of Infection Signs and Symptoms  Outcome: Progressing

## 2024-05-21 NOTE — SUBJECTIVE & OBJECTIVE
"Past Medical History:   Diagnosis Date    Diabetes mellitus     Diabetes mellitus, type 2     Glaucoma     High cholesterol     Hypertension        Past Surgical History:   Procedure Laterality Date    CATARACT EXTRACTION      EYE SURGERY         Review of patient's allergies indicates:  No Known Allergies    No current facility-administered medications on file prior to encounter.     Current Outpatient Medications on File Prior to Encounter   Medication Sig    amLODIPine (NORVASC) 10 MG tablet Take 1 tablet (10 mg total) by mouth once daily.    aspirin (ECOTRIN) 81 MG EC tablet Take 81 mg by mouth 2 (two) times a day. For 30 days    bimatoprost (LUMIGAN) 0.01 % Drop Place 1 drop into both eyes every evening.    carvediloL (COREG) 3.125 MG tablet Take 3.125 mg by mouth 2 (two) times daily with meals.    cholecalciferol, vitamin D3, 125 mcg (5,000 unit) TbDL Take 5,000 Units by mouth once daily.    dorzolamide (TRUSOPT) 2 % ophthalmic solution Place 1 drop into both eyes 2 (two) times a day.    insulin glargine, TOUJEO, (TOUJEO SOLOSTAR U-300 INSULIN) 300 unit/mL (1.5 mL) InPn pen Inject 30 Units into the skin every evening.    liraglutide 0.6 mg/0.1 mL, 18 mg/3 mL, subq PNIJ (VICTOZA 3-LORI) 0.6 mg/0.1 mL (18 mg/3 mL) PnIj pen Inject 1.8 mg into the skin once daily.    losartan-hydrochlorothiazide 100-25 mg (HYZAAR) 100-25 mg per tablet Take 1 tablet by mouth once daily.    metFORMIN (GLUCOPHAGE) 1000 MG tablet Take 1 tablet (1,000 mg total) by mouth 2 (two) times daily with meals.    pen needle, diabetic 31 gauge x 1/4" Ndle Inject 2 each into the skin 2 (two) times a day.    pioglitazone (ACTOS) 15 MG tablet Take 1 tablet (15 mg total) by mouth once daily.    rosuvastatin (CRESTOR) 40 MG Tab Take 40 mg by mouth every evening.    [DISCONTINUED] insulin detemir U-100, Levemir, (LEVEMIR FLEXTOUCH U100 INSULIN) 100 unit/mL (3 mL) InPn pen Inject 65 Units into the skin every evening. (Patient not taking: Reported on " 4/30/2024)    [DISCONTINUED] pravastatin (PRAVACHOL) 40 MG tablet Take 40 mg by mouth once daily.     Family History    None       Tobacco Use    Smoking status: Never     Passive exposure: Past (father)    Smokeless tobacco: Never   Substance and Sexual Activity    Alcohol use: Yes     Alcohol/week: 1.0 standard drink of alcohol     Types: 1 Cans of beer per week    Drug use: Never    Sexual activity: Not Currently     Review of Systems   Constitutional:  Negative for fatigue and fever.   HENT:  Negative for congestion, postnasal drip, rhinorrhea and sinus pressure.    Eyes:  Negative for visual disturbance.   Respiratory:  Negative for cough.    Cardiovascular:  Negative for chest pain.   Gastrointestinal:  Negative for abdominal distention and abdominal pain.   Endocrine: Negative for cold intolerance and heat intolerance.   Genitourinary:  Negative for flank pain.   Musculoskeletal:  Positive for arthralgias. Negative for gait problem.   Skin:  Negative for color change.   Allergic/Immunologic: Negative for food allergies.   Neurological:  Positive for weakness. Negative for dizziness, seizures and headaches.   Hematological:  Negative for adenopathy. Does not bruise/bleed easily.   Psychiatric/Behavioral:  Negative for behavioral problems. The patient is not nervous/anxious.      Objective:     Vital Signs (Most Recent):  Temp: 97.8 °F (36.6 °C) (05/21/24 1354)  Pulse: 78 (05/21/24 1358)  Resp: 18 (05/21/24 1358)  BP: (!) 148/78 (05/21/24 1354)  SpO2: 98 % (05/21/24 1358) Vital Signs (24h Range):  Temp:  [97.8 °F (36.6 °C)] 97.8 °F (36.6 °C)  Pulse:  [75-78] 78  Resp:  [18-20] 18  SpO2:  [96 %-98 %] 98 %  BP: (148)/(78) 148/78     Weight: 105 kg (231 lb 7.7 oz)  Body mass index is 30.54 kg/m².     Physical Exam  Vitals and nursing note reviewed. Exam conducted with a chaperone present.   Constitutional:       Appearance: Normal appearance. He is normal weight.   HENT:      Head: Normocephalic and atraumatic.       Right Ear: Tympanic membrane normal.      Left Ear: Tympanic membrane normal.      Nose: Nose normal.      Mouth/Throat:      Mouth: Mucous membranes are moist.   Eyes:      Extraocular Movements: Extraocular movements intact.      Conjunctiva/sclera: Conjunctivae normal.      Pupils: Pupils are equal, round, and reactive to light.   Cardiovascular:      Rate and Rhythm: Normal rate and regular rhythm.      Pulses: Normal pulses.   Pulmonary:      Effort: Pulmonary effort is normal.      Breath sounds: Normal breath sounds.   Abdominal:      General: Abdomen is flat. Bowel sounds are normal.      Palpations: Abdomen is soft.   Musculoskeletal:         General: Normal range of motion.      Cervical back: Normal range of motion and neck supple.      Comments: Recent ORIF of right hip   Skin:     General: Skin is warm and dry.   Neurological:      General: No focal deficit present.      Mental Status: He is alert and oriented to person, place, and time.   Psychiatric:         Mood and Affect: Mood normal.              CRANIAL NERVES     CN III, IV, VI   Pupils are equal, round, and reactive to light.       Significant Labs: All pertinent labs within the past 24 hours have been reviewed.    Significant Imaging: I have reviewed all pertinent imaging results/findings within the past 24 hours.

## 2024-05-21 NOTE — HPI
This 78 yr. Old WM fell at home and fractured his right hip. He underwent an ORIF of said fracture. He is here for therapy after surgery. He has hypertension, diabetes, hyperlipidemia, and hyponatremia. He was found to have a UTI while there. Culture is pending.

## 2024-05-21 NOTE — PT/OT/SLP EVAL
Occupational Therapy   Evaluation    Name: Milton Johnson  MRN: [id number]  Admitting Diagnosis: Muscular weakness  Recent Surgery: * No surgery found *      Recommendations:     Discharge Recommendations:    Discharge Equipment Recommendations:  3-in-1 commode, walker, rolling  Barriers to discharge:       Assessment:     Milton Johnson is a 78 y.o. male with a medical diagnosis of Muscular weakness. Performance deficits affecting function: weakness, impaired endurance, impaired self care skills, impaired functional mobility, impaired balance, decreased lower extremity function, decreased safety awareness, pain.      Rehab Prognosis: Good; patient would benefit from acute skilled OT services to address these deficits and reach maximum level of function.       Plan:     Patient to be seen 5 x/week to address the above listed problems via therapeutic exercises, therapeutic activities, self-care/home management  Plan of Care Expires:    Plan of Care Reviewed with: patient    Subjective     Chief Complaint: Decreased mobility  Patient/Family Comments/goals: Go home    Occupational Profile:  Living Environment: lives with wife  Previous level of function: Independent  Roles and Routines: self-care, home management  Equipment Used at Home: none  Assistance upon Discharge: Wife    Pain/Comfort:  Pain Rating 1: 3/10    Patients cultural, spiritual, Sabianism conflicts given the current situation: no    Objective:     Communicated with: RN prior to session.  Patient found supine with   upon OT entry to room.    General Precautions: Standard, fall  Orthopedic Precautions:    Braces:    Respiratory Status: Room air    Occupational Performance:    Bed Mobility:    Patient completed Rolling/Turning to Right with minimum assistance    Functional Mobility/Transfers:  Patient completed Sit <> Stand Transfer with minimum assistance  with  rolling walker   Functional Mobility: Min a with RW    Activities of Daily  What Type Of Note Output Would You Prefer (Optional)?: Standard Output How Severe Are Your Spot(S)?: mild Living:  Toileting: minimum assistance clothing management    Cognitive/Visual Perceptual:  Cognitive/Psychosocial Skills:     -       Oriented to: Person, Place, Time, and Situation     Physical Exam:  Upper Extremity Range of Motion:     -       Right Upper Extremity: WFL  -       Left Upper Extremity: WFL    AMPAC 6 Click ADL:  AMPAC Total Score: 20    Treatment & Education:  Pt educated on OT role/POC.   Importance of OOB activity with staff assistance.  Importance of sitting up in the chair throughout the day as tolerated, especially for meals   Safety during functional t/f and mobility  Importance of assisting with self-care activities       Patient left supine with call button in reach    GOALS:   Multidisciplinary Problems       Occupational Therapy Goals          Problem: Occupational Therapy    Goal Priority Disciplines Outcome Interventions   Occupational Therapy Goal     OT, PT/OT     Description: Description: Grooming Status:   Short Term Goal: Pt will perform grooming with s/u sitting EOB.   Long Term Goal: Pt will perform grooming/oral hygiene standing at sink with Mod I      LE dressing Status:   Short Term Goal: Pt will perform LE dressing with mod a.   Long Term Goal: Pt will perform LE dressing with min a.    Toileting Status:   Short Term Goal: Pt will perform toilet hygiene on BSC with min a.  Long Term Goal: Pt will perform toilet hygiene on toilet with no AE with s/u.    Commode Transfer:   Short Term Goal: Pt will perform BSC t/f with min a.  Long Term Goal:  Pt will perform toilet t/f in bathroom with s/u.     Bathing Status:   Long Term Goal: Pt will perform sponge bath with s/u with no unsafe fatigue.     Strength Status:   Long Term Goal: Pt to perform BUE strengthening with weights and/or body weight to increase ADL independence and safety    Endurance Status:   Short Term Goal:pt to perform 15 min OT treatment with 5 or greater rest breaks  Long Term Goal: pt to perform 30 min OT treat  Have Your Spot(S) Been Treated In The Past?: has not been treated with 3 or less rest breaks                       History:     Past Medical History:   Diagnosis Date    Diabetes mellitus     Diabetes mellitus, type 2     Glaucoma     High cholesterol     Hypertension          Past Surgical History:   Procedure Laterality Date    CATARACT EXTRACTION      EYE SURGERY         Time Tracking:     OT Date of Treatment: 05/21/24  OT Start Time: 1510  OT Stop Time: 1520  OT Total Time (min): 10 min    Billable Minutes:Evaluation 10 min  Jud Kaur, OTR/L      5/21/2024   Hpi Title: Evaluation of Skin Lesions Additional History: Patient palpated on the lesion tried to get something out, only blood appeared. She is not putting anything on it at this time.

## 2024-05-21 NOTE — PT/OT/SLP EVAL
Physical Therapy Evaluation    Patient Name:  Milton Johnson   MRN:  [id number]    Recommendations:     Discharge Recommendations: Low Intensity Therapy   Discharge Equipment Recommendations: 3-in-1 commode, walker, rolling   Barriers to discharge: None    Assessment:     Milton Johnson is a 78 y.o. male admitted with a medical diagnosis of <principal problem not specified>.  He presents with the following impairments/functional limitations: weakness, impaired endurance, impaired functional mobility, gait instability, impaired balance, decreased lower extremity function, decreased safety awareness, pain Patient's impairments have resulted in decrease safety and idependence with functional mobility and ADLs resulting in decreased ability to return home at this time.  .    Rehab Prognosis: Good; patient would benefit from acute skilled PT services to address these deficits and reach maximum level of function.    Recent Surgery: * No surgery found *      Plan:     During this hospitalization, patient to be seen 5 x/week to address the identified rehab impairments via gait training, therapeutic activities, therapeutic exercises and progress toward the following goals:    Plan of Care Expires:  06/11/24    Subjective     Chief Complaint: weakness   Patient/Family Comments/goals: improve safety and independence with mobility for safe return home.     Pain/Comfort:  Pain Rating 1: 0/10    Patients cultural, spiritual, Scientologist conflicts given the current situation: no    Living Environment:  Patient lives with his wife. They have two steps with a rail to enter their home.   Prior to admission, patients level of function was Indpendent.  Equipment used at home: none.  DME owned (not currently used): none.  Upon discharge, patient will have assistance from family.    Objective:     Communicated with nursing staff prior to session.  Patient found HOB elevated with    upon PT entry to room.    General Precautions: Standard,     Orthopedic Precautions:RLE weight bearing as tolerated   Braces: N/A  Respiratory Status: Room air    Exams:  RLE Strength: Deficits: 2-/5  LLE Strength: WFL    Functional Mobility:  Bed Mobility:     Supine to Sit: minimum assistance  Sit to Supine: minimum assistance  Transfers:     Sit to Stand:  contact guard assistance with rolling walker  Gait: 20 feet with rolling walker on level surface with min A   Balance: Fair       AM-PAC 6 CLICK MOBILITY  Total Score:16       Patient left HOB elevated with call button in reach and visitors present.    GOALS:   Multidisciplinary Problems       Physical Therapy Goals          Problem: Physical Therapy    Goal Priority Disciplines Outcome Goal Variances Interventions   Physical Therapy Goal     PT, PT/OT                          History:     Past Medical History:   Diagnosis Date    Diabetes mellitus     Diabetes mellitus, type 2     Glaucoma     High cholesterol     Hypertension        Past Surgical History:   Procedure Laterality Date    CATARACT EXTRACTION      EYE SURGERY         Time Tracking:     PT Received On: 05/21/24  PT Start Time: 1510     PT Stop Time: 1520  PT Total Time (min): 10 min     Billable Minutes: Evaluation 10 minutes  Katelynn Waddell, PT, DPT         05/21/2024

## 2024-05-21 NOTE — PLAN OF CARE
[facility] - Medical Surgical Unit  Initial Discharge Assessment       Primary Care Provider: Dontrell Shetty MD    Admission Diagnosis: Generalized weakness [R53.1]    Admission Date: 5/21/2024  Expected Discharge Date:     Transition of Care Barriers: (P) None    Payor: HU[employer] MANAGED MEDICARE / Plan: [employer] MEDICARE PPO / Product Type: Medicare Advantage /     Extended Emergency Contact Information  Primary Emergency Contact: AlexTino  Address: [address] [place] [address] [facility]  Home Phone: [phone]  Mobile Phone: [phone]  Relation: Spouse    Discharge Plan A: (P) Home with family  Discharge Plan B: (P) Home with family, Home Health      [facility] [place] - [address]  [address]  [place] [address]  Phone: [phone] Fax: [phone]      Initial Assessment (most recent)       Adult Discharge Assessment - 05/21/24 1432          Discharge Assessment    Assessment Type Discharge Planning Assessment (P)      Confirmed/corrected address, phone number and insurance Yes (P)      Confirmed Demographics Correct on Facesheet (P)      Source of Information patient;health record (P)      Reason For Admission hip fx (P)      People in Home spouse (P)      Facility Arrived From: [facility] (P)      Do you expect to return to your current living situation? Yes (P)      Do you have help at home or someone to help you manage your care at home? Yes (P)      Who are your caregiver(s) and their phone number(s)? Tino Johnson (spouse) [phone] (P)      Prior to hospitilization cognitive status: Alert/Oriented (P)      Current cognitive status: Alert/Oriented (P)      Walking or Climbing Stairs Difficulty no (P)      Dressing/Bathing Difficulty no (P)      Home Accessibility stairs to enter home (P)      Number of Stairs, Main Entrance two (P)      Stair Railings, Main Entrance railings safe and in good condition (P)       Home Layout Able to live on 1st floor (P)      Equipment Currently Used at Home none (P)      Readmission within 30 days? No (P)      Patient currently being followed by outpatient case management? No (P)      Do you currently have service(s) that help you manage your care at home? No (P)      Do you take prescription medications? Yes (P)      Do you have prescription coverage? Yes (P)      Coverage [employer] (P)      Do you have any problems affording any of your prescribed medications? No (P)      Is the patient taking medications as prescribed? yes (P)      Who is going to help you get home at discharge? Tino Johnson (spouse) [phone] (P)      How do you get to doctors appointments? car, drives self;family or friend will provide (P)      Are you on dialysis? No (P)      Do you take coumadin? No (P)      Discharge Plan A Home with family (P)      Discharge Plan B Home with family;Home Health (P)      DME Needed Upon Discharge  walker, rolling (P)      Discharge Plan discussed with: Patient (P)      Transition of Care Barriers None (P)         Physical Activity    On average, how many days per week do you engage in moderate to strenuous exercise (like a brisk walk)? 5 days (P)      On average, how many minutes do you engage in exercise at this level? 30 min (P)         Financial Resource Strain    How hard is it for you to pay for the very basics like food, housing, medical care, and heating? Not hard at all (P)         Housing Stability    In the last 12 months, was there a time when you were not able to pay the mortgage or rent on time? No (P)      At any time in the past 12 months, were you homeless or living in a shelter (including now)? No (P)         Transportation Needs    Has the lack of transportation kept you from medical appointments, meetings, work or from getting things needed for daily living? No (P)         Food Insecurity    Within the past 12 months, you worried that your food would run out  before you got the money to buy more. Never true (P)      Within the past 12 months, the food you bought just didn't last and you didn't have money to get more. Never true (P)         Stress    Do you feel stress - tense, restless, nervous, or anxious, or unable to sleep at night because your mind is troubled all the time - these days? Not at all (P)         Social Isolation    How often do you feel lonely or isolated from those around you?  Never (P)         Alcohol Use    Q1: How often do you have a drink containing alcohol? Never (P)      Q2: How many drinks containing alcohol do you have on a typical day when you are drinking? Patient does not drink (P)      Q3: How often do you have six or more drinks on one occasion? Never (P)         Utilities    In the past 12 months has the electric, gas, oil, or water company threatened to shut off services in your home? No (P)         Health Literacy    How often do you need to have someone help you when you read instructions, pamphlets, or other written material from your doctor or pharmacy? Never (P)         OTHER    Name(s) of People in Home Tino Johnson (spouse) [phone] (P)                  Pt lives with spouse and prior to accident was completely independent with all ADLs. Pt will need a rolling walker at discharge. CM will follow for any discharge needs.

## 2024-05-22 LAB
GLUCOSE SERPL-MCNC: 126 MG/DL (ref 70–105)
GLUCOSE SERPL-MCNC: 198 MG/DL (ref 70–105)
GLUCOSE SERPL-MCNC: 224 MG/DL (ref 70–105)
GLUCOSE SERPL-MCNC: 264 MG/DL (ref 70–105)

## 2024-05-22 PROCEDURE — 94761 N-INVAS EAR/PLS OXIMETRY MLT: CPT

## 2024-05-22 PROCEDURE — 25000003 PHARM REV CODE 250: Performed by: FAMILY MEDICINE

## 2024-05-22 PROCEDURE — 99308 SBSQ NF CARE LOW MDM 20: CPT | Mod: ,,, | Performed by: FAMILY MEDICINE

## 2024-05-22 PROCEDURE — 97116 GAIT TRAINING THERAPY: CPT | Mod: CQ

## 2024-05-22 PROCEDURE — 63600175 PHARM REV CODE 636 W HCPCS: Performed by: FAMILY MEDICINE

## 2024-05-22 PROCEDURE — 11000004 HC SNF PRIVATE

## 2024-05-22 PROCEDURE — 82962 GLUCOSE BLOOD TEST: CPT

## 2024-05-22 PROCEDURE — 97110 THERAPEUTIC EXERCISES: CPT | Mod: CQ

## 2024-05-22 PROCEDURE — 97110 THERAPEUTIC EXERCISES: CPT

## 2024-05-22 RX ORDER — POLYETHYLENE GLYCOL 3350 17 G/17G
17 POWDER, FOR SOLUTION ORAL DAILY
Status: DISCONTINUED | OUTPATIENT
Start: 2024-05-22 | End: 2024-06-07 | Stop reason: HOSPADM

## 2024-05-22 RX ORDER — POLYETHYLENE GLYCOL 3350 17 G/17G
17 POWDER, FOR SOLUTION ORAL DAILY
Status: DISCONTINUED | OUTPATIENT
Start: 2024-05-23 | End: 2024-05-22

## 2024-05-22 RX ORDER — LACTULOSE 10 G/15ML
30 SOLUTION ORAL EVERY 6 HOURS PRN
Status: DISCONTINUED | OUTPATIENT
Start: 2024-05-22 | End: 2024-06-07 | Stop reason: HOSPADM

## 2024-05-22 RX ADMIN — POLYETHYLENE GLYCOL 3350 17 G: 17 POWDER, FOR SOLUTION ORAL at 05:05

## 2024-05-22 RX ADMIN — POTASSIUM CHLORIDE 20 MEQ: 1500 TABLET, EXTENDED RELEASE ORAL at 09:05

## 2024-05-22 RX ADMIN — INSULIN DETEMIR 30 UNITS: 100 INJECTION, SOLUTION SUBCUTANEOUS at 09:05

## 2024-05-22 RX ADMIN — NITROFURANTOIN MONOHYDRATE/MACROCRYSTALS 100 MG: 25; 75 CAPSULE ORAL at 08:05

## 2024-05-22 RX ADMIN — OXYCODONE HYDROCHLORIDE AND ACETAMINOPHEN 1 TABLET: 10; 325 TABLET ORAL at 05:05

## 2024-05-22 RX ADMIN — DORZOLAMIDE HCL 1 DROP: 20 SOLUTION/ DROPS OPHTHALMIC at 09:05

## 2024-05-22 RX ADMIN — ASPIRIN 81 MG: 81 TABLET, COATED ORAL at 09:05

## 2024-05-22 RX ADMIN — LOSARTAN POTASSIUM 100 MG: 100 TABLET, FILM COATED ORAL at 08:05

## 2024-05-22 RX ADMIN — ASPIRIN 81 MG: 81 TABLET, COATED ORAL at 08:05

## 2024-05-22 RX ADMIN — SENNOSIDES AND DOCUSATE SODIUM 1 TABLET: 8.6; 5 TABLET ORAL at 09:05

## 2024-05-22 RX ADMIN — SENNOSIDES AND DOCUSATE SODIUM 1 TABLET: 8.6; 5 TABLET ORAL at 08:05

## 2024-05-22 RX ADMIN — BIMATOPROST 1 DROP: 0.1 SOLUTION/ DROPS OPHTHALMIC at 09:05

## 2024-05-22 RX ADMIN — DORZOLAMIDE HCL 1 DROP: 20 SOLUTION/ DROPS OPHTHALMIC at 08:05

## 2024-05-22 RX ADMIN — AMLODIPINE BESYLATE 10 MG: 10 TABLET ORAL at 08:05

## 2024-05-22 RX ADMIN — POTASSIUM CHLORIDE 20 MEQ: 1500 TABLET, EXTENDED RELEASE ORAL at 08:05

## 2024-05-22 RX ADMIN — CARVEDILOL 3.12 MG: 3.12 TABLET, FILM COATED ORAL at 08:05

## 2024-05-22 RX ADMIN — PIOGLITAZONE 15 MG: 15 TABLET ORAL at 08:05

## 2024-05-22 RX ADMIN — CHOLECALCIFEROL TAB 125 MCG (5000 UNIT) 5000 UNITS: 125 TAB at 08:05

## 2024-05-22 RX ADMIN — NITROFURANTOIN MONOHYDRATE/MACROCRYSTALS 100 MG: 25; 75 CAPSULE ORAL at 09:05

## 2024-05-22 RX ADMIN — HYDROCHLOROTHIAZIDE 25 MG: 25 TABLET ORAL at 08:05

## 2024-05-22 RX ADMIN — OXYCODONE HYDROCHLORIDE AND ACETAMINOPHEN 1 TABLET: 10; 325 TABLET ORAL at 06:05

## 2024-05-22 RX ADMIN — CARVEDILOL 3.12 MG: 3.12 TABLET, FILM COATED ORAL at 05:05

## 2024-05-22 RX ADMIN — LACTULOSE 30 G: 20 SOLUTION ORAL at 09:05

## 2024-05-22 RX ADMIN — ATORVASTATIN CALCIUM 80 MG: 40 TABLET, FILM COATED ORAL at 09:05

## 2024-05-22 NOTE — CONSULTS
[facility]  Adult Nutrition  Consult Note         Reason for Assessment  Reason For Assessment: consult assess  Nutrition Risk Screen: no indicators present    Assessment and Plan  Consult received and appreciated. Patient admitted 5/21 with a dx of R hip Fx s/p ORIF, Muscular weakness, and DM. Patient is ordered a 2000 calorie consistent carbohydrate diet. Po intake since admit is good with 100% per flowsheets.     Patient is 105 kg with a BMI of 30.54 which is overweight. Diet appropriate at this time. Recommend to continue diet as tolerated. RD Following.           Learning Needs/Social Determinants of Health  Learning Assessment       05/21/2024 1343 [facility] (5/21/2024 - Present)   Created by Karol Jackson, RN - RN (Nurse) Status: Complete                 PRIMARY LEARNER     Primary Learner Name:  Mr. Johnson TH - 05/21/2024 1343    Relationship:  Patient TH - 05/21/2024 1343    Does the primary learner have any barriers to learning?:  No Barriers TH - 05/21/2024 1343    What is the preferred language of the primary learner?:  English TH - 05/21/2024 1343    Is an  required?:  Yes TH - 05/21/2024 1343    How does the primary learner prefer to learn new concepts?:  Listening TH - 05/21/2024 1343    How often do you need to have someone help you read instructions, pamphlets, or written material from your doctor or pharmacy?:  Never TH - 05/21/2024 1343        CO-LEARNER #1     No question answered        CO-LEARNER #2     No question answered        SPECIAL TOPICS     No question answered        ANSWERED BY:     No question answered        Comments         Edit History       Karol Jackson, RN - RN (Nurse)   05/21/2024 1343                           Social Determinants of Health     Tobacco Use: Low Risk  (5/21/2024)    Patient History     Smoking Tobacco Use: Never     Smokeless Tobacco Use: Never     Passive Exposure:  Past   Alcohol Use: Not At Risk (5/21/2024)    AUDIT-C     Frequency of Alcohol Consumption: Never     Average Number of Drinks: Patient does not drink     Frequency of Binge Drinking: Never   Financial Resource Strain: Low Risk  (5/21/2024)    Overall Financial Resource Strain (CARDIA)     Difficulty of Paying Living Expenses: Not hard at all   Food Insecurity: No Food Insecurity (5/21/2024)    Hunger Vital Sign     Worried About Running Out of Food in the Last Year: Never true     Ran Out of Food in the Last Year: Never true   Transportation Needs: No Transportation Needs (5/21/2024)    TRANSPORTATION NEEDS     Transportation : No   Physical Activity: Sufficiently Active (5/21/2024)    Exercise Vital Sign     Days of Exercise per Week: 5 days     Minutes of Exercise per Session: 30 min   Stress: No Stress Concern Present (5/21/2024)    [place] [employer] - Occupational Stress Questionnaire     Feeling of Stress : Not at all   Housing Stability: Low Risk  (5/21/2024)    Housing Stability Vital Sign     Unable to Pay for Housing in the Last Year: No     Homeless in the Last Year: No   Depression: Low Risk  (2/6/2024)    Depression     Last PHQ-4: Flowsheet Data: 1   Utilities: Not At Risk (5/21/2024)    [facility] Utilities     Threatened with loss of utilities: No   Health Literacy: Adequate Health Literacy (5/21/2024)     Health Literacy     Frequency of need for help with medical instructions: Never   Social Isolation: Socially Integrated (5/21/2024)    Social Isolation     Social Isolation: 1            Malnutrition  Is Patient Malnourished: No    Nutrition Diagnosis  Altered nutrition related laboratory values related to Diabetes Mellitus as evidenced by elevated BG  Comments: diet appropriate    Recent Labs   Lab 05/21/24  1432 05/21/24  1613 05/22/24  0556   *  --   --    POCGLU  --    < > 126*    < > = values in this interval not displayed.     Comments on Glucose: HX  dm    Nutrition Prescription / Recommendations  Recommendation/Intervention: Continue diet as tolerated  Goals: po intake % during admission  Nutrition Goal Status: new  Current Diet Order: 2000 calorie consistent carbohydrate  Chewing or Swallowing Difficulty?: No Chewing or swallowing difficulty  Recommended Diet: Consistent Carbohydrate 2000 (75g Carbs)  Recommended Oral Supplement: No Oral Supplements  Is Nutrition Support Recommended: [facility] Nutrition Support: No  Is Nutrition Education Recommended: No    Monitor and Evaluation  % current Intake: P.O. intake of 75 - 100 %  % intake to meet estimated needs: 75 - 100 %  Food and Nutrient Intake: energy intake, food and beverage intake  Food and Nutrient Adminstration: diet order  Anthropometric Measurements: height/length, weight, body mass index, weight change  Biochemical Data, Medical Tests and Procedures: electrolyte and renal panel, gastrointestinal profile, glucose/endocrine profile, inflammatory profile  Tolerance: tolerating    Current Medical Diagnosis and Past Medical History     Past Medical History:   Diagnosis Date    Diabetes mellitus     Diabetes mellitus, type 2     Glaucoma     High cholesterol     Hypertension        Nutrition/Diet History  Spiritual, Cultural Beliefs, Yazidism Practices, Values that Affect Care: no  Food Allergies: NKFA  Factors Affecting Nutritional Intake: None identified at this time    Lab/Procedures/Meds  Recent Labs   Lab 05/21/24  1432   *   K 3.1*   BUN 19*   CREATININE 1.47*   CALCIUM 9.5   CL 88*   Na/K low consider replete to WNL  BUN/Cr elevated Hx CKD  Last A1c:   Lab Results   Component Value Date    HGBA1C 9.4 (H) 04/30/2024    HGBA1C 8.1 (H) 07/07/2022     Lab Results   Component Value Date    RBC 3.97 (L) 05/21/2024    HGB 11.8 (L) 05/21/2024    HCT 33.4 (L) 05/21/2024    MCV 84.1 05/21/2024    MCH 29.7 05/21/2024    MCHC 35.3 05/21/2024     Pertinent Labs Reviewed: reviewed  Pertinent  "Medications Reviewed: reviewed  Scheduled Meds:   amLODIPine  10 mg Oral Daily    aspirin  81 mg Oral BID    atorvastatin  80 mg Oral QHS    bimatoprost  1 drop Both Eyes QHS    carvediloL  3.125 mg Oral BID WM    cholecalciferol (vitamin D3)  5,000 Units Oral Daily    dorzolamide  1 drop Right Eye BID    losartan  100 mg Oral Daily    And    hydroCHLOROthiazide  25 mg Oral Daily    insulin detemir U-100  30 Units Subcutaneous QHS    liraglutide 0.6 mg/0.1 mL (18 mg/3 mL) subq PNIJ  1.8 mg Subcutaneous Daily    nitrofurantoin (macrocrystal-monohydrate)  100 mg Oral Q12H    pioglitazone  15 mg Oral Daily    potassium chloride  20 mEq Oral BID    senna-docusate 8.6-50 mg  1 tablet Oral BID     Continuous Infusions:  PRN Meds:.  Current Facility-Administered Medications:     acetaminophen, 650 mg, Oral, Q6H PRN    calcium carbonate, 500 mg, Oral, BID PRN    melatonin, 6 mg, Oral, Nightly PRN    oxyCODONE-acetaminophen, 1 tablet, Oral, Q6H PRN    Anthropometrics  Temp: 97.4 °F (36.3 °C)  Height Method: Stated  Height: 6' 1" (185.4 cm)  Height (inches): 73 in  Weight Method: Bed Scale  Weight: 105 kg (231 lb 7.7 oz)  Weight (lb): 231.49 lb  Ideal Body Weight (IBW), Male: 184 lb  % Ideal Body Weight, Male (lb): 125.81 %  BMI (Calculated): 30.5       Estimated/Assessed Needs      Temp: 97.4 °F (36.3 °C)Oral  Weight Used For Calorie Calculations: 105 kg (231 lb 7.7 oz)   Energy Need Method: Kcal/kg Energy Calorie Requirements (kcal): 0220-5576  Weight Used For Protein Calculations: 105 kg (231 lb 7.7 oz)  Protein Requirements:   Estimated Fluid Requirement Method: RDA Method    RDA Method (mL): 2100       Nutrition by Nursing  Diet/Nutrition Received: consistent carb/diabetic diet  Intake (%): 100%        Last Bowel Movement: 05/17/24                Nutrition Follow-Up  RD Follow-up?: Yes      Nutrition Discharge Planning: home with family; continue consistent carb diet on d/c          Available via Secure Chat  "

## 2024-05-22 NOTE — SUBJECTIVE & OBJECTIVE
Interval History: no complaint voiced. Will begin therapy today.    Review of Systems  Objective:     Vital Signs (Most Recent):  Temp: 97.4 °F (36.3 °C) (05/22/24 0720)  Pulse: 85 (05/22/24 0732)  Resp: 20 (05/22/24 0732)  BP: (!) 150/79 (05/22/24 0720)  SpO2: 98 % (05/22/24 0732) Vital Signs (24h Range):  Temp:  [97.4 °F (36.3 °C)-98.3 °F (36.8 °C)] 97.4 °F (36.3 °C)  Pulse:  [75-95] 85  Resp:  [18-20] 20  SpO2:  [94 %-98 %] 98 %  BP: (145-150)/(75-79) 150/79     Weight: 105 kg (231 lb 7.7 oz)  Body mass index is 30.54 kg/m².    Intake/Output Summary (Last 24 hours) at 5/22/2024 0823  Last data filed at 5/21/2024 2231  Gross per 24 hour   Intake --   Output 600 ml   Net -600 ml         Physical Exam        Significant Labs: All pertinent labs within the past 24 hours have been reviewed.    Significant Imaging: I have reviewed all pertinent imaging results/findings within the past 24 hours.

## 2024-05-22 NOTE — PLAN OF CARE
Problem: Adult Inpatient Plan of Care  Goal: Plan of Care Review  Outcome: Progressing  Flowsheets (Taken 5/21/2024 1958)  Plan of Care Reviewed With: patient  Goal: Patient-Specific Goal (Individualized)  Outcome: Progressing  Goal: Absence of Hospital-Acquired Illness or Injury  Outcome: Progressing  Intervention: Prevent Infection  Flowsheets (Taken 5/21/2024 1958)  Infection Prevention:   environmental surveillance performed   equipment surfaces disinfected   hand hygiene promoted   personal protective equipment utilized   single patient room provided   rest/sleep promoted  Goal: Optimal Comfort and Wellbeing  Outcome: Progressing  Intervention: Provide Person-Centered Care  Flowsheets (Taken 5/21/2024 1958)  Trust Relationship/Rapport:   choices provided   care explained   emotional support provided   empathic listening provided   questions encouraged   reassurance provided   thoughts/feelings acknowledged   questions answered  Goal: Readiness for Transition of Care  Outcome: Progressing  Intervention: Mutually Develop Transition Plan  Flowsheets (Taken 5/21/2024 1958)  Equipment Currently Used at Home: none  Transportation Anticipated: family or friend will provide  Communicated ARACELI with patient/caregiver: Date not available/Unable to determine  Do you expect to return to your current living situation?: Yes  Do you have help at home or someone to help you manage your care at home?: Yes  Readmission within 30 days?: No  Do you currently have service(s) that help you manage your care at home?: No

## 2024-05-22 NOTE — PROGRESS NOTES
[facility]  Hospital Medicine  Progress Note    Patient Name: Milton Johnson  MRN: [id number]  Patient Class: IP- Swing   Admission Date: 5/21/2024  Length of Stay: 1 days  Attending Physician: Valentina Walker,*  Primary Care Provider: Dontrell Shetty MD        Subjective:     Principal Problem:Muscular weakness        HPI:  This 78 yr. Old WM fell at home and fractured his right hip. He underwent an ORIF of said fracture. He is here for therapy after surgery. He has hypertension, diabetes, hyperlipidemia, and hyponatremia. He was found to have a UTI while there. Culture is pending.    Overview/Hospital Course:  5/22/24 - pt. Is doing well so far. Voices no complaint. Will continue present treatment.    Interval History: no complaint voiced. Will begin therapy today.    Review of Systems  Objective:     Vital Signs (Most Recent):  Temp: 97.4 °F (36.3 °C) (05/22/24 0720)  Pulse: 85 (05/22/24 0732)  Resp: 20 (05/22/24 0732)  BP: (!) 150/79 (05/22/24 0720)  SpO2: 98 % (05/22/24 0732) Vital Signs (24h Range):  Temp:  [97.4 °F (36.3 °C)-98.3 °F (36.8 °C)] 97.4 °F (36.3 °C)  Pulse:  [75-95] 85  Resp:  [18-20] 20  SpO2:  [94 %-98 %] 98 %  BP: (145-150)/(75-79) 150/79     Weight: 105 kg (231 lb 7.7 oz)  Body mass index is 30.54 kg/m².    Intake/Output Summary (Last 24 hours) at 5/22/2024 0823  Last data filed at 5/21/2024 2231  Gross per 24 hour   Intake --   Output 600 ml   Net -600 ml         Physical Exam        Significant Labs: All pertinent labs within the past 24 hours have been reviewed.    Significant Imaging: I have reviewed all pertinent imaging results/findings within the past 24 hours.    Assessment/Plan:      No notes have been filed under this hospital service.  Service: Hospital Medicine    VTE Risk Mitigation (From admission, onward)           Ordered     IP VTE LOW RISK PATIENT  Once         05/21/24 1355     Place ESTEFANIA hose  Until discontinued          05/21/24 1355                    Discharge Planning   ARACELI:      Code Status: Full Code   Is the patient medically ready for discharge?:     Reason for patient still in hospital (select all that apply): Patient trending condition  Discharge Plan A: Home with family                  Valentina Walker MD  Department of Hospital Medicine   [facility]

## 2024-05-22 NOTE — PT/OT/SLP PROGRESS
Occupational Therapy   Treatment    Name: Milton Johnson  MRN: [id number]  Admitting Diagnosis:  Muscular weakness       Recommendations:     Discharge Recommendations:    Discharge Equipment Recommendations:  3-in-1 commode, walker, rolling  Barriers to discharge:       Assessment:     Milton Johnson is a 78 y.o. male with a medical diagnosis of Muscular weakness.   Performance deficits affecting function are weakness, impaired endurance, impaired self care skills, impaired functional mobility, impaired balance, decreased lower extremity function, decreased safety awareness.     Rehab Prognosis:  Good; patient would benefit from acute skilled OT services to address these deficits and reach maximum level of function.       Plan:     Patient to be seen 5 x/week to address the above listed problems via therapeutic exercises, therapeutic activities, self-care/home management  Plan of Care Expires:    Plan of Care Reviewed with: patient    Subjective     Chief Complaint: Decreased mobility  Patient/Family Comments/goals: Get stronger  Pain/Comfort:  Pain Rating 1: 3/10    Objective:     Communicated with: RN prior to session.  Patient found up in chair with   upon OT entry to room.    General Precautions: Standard, fall    Orthopedic Precautions:   Braces:    Respiratory Status: Room air     Occupational Performance:     Bed Mobility:    Patient was up in his chair already    Functional Mobility/Transfers:  Patient completed Sit <> Stand Transfer with minimum assistance  with  rolling walker   Functional Mobility: min a with RW    Activities of Daily Living:  Feeding S/u   Grooming S/u   Bathing Min a   UB dsg S/u   LB dsg Min a   Toileting Min a   Toilet t/f Min a           AMPAC 6 Click ADL: 20    Treatment & Education:  Pt educated on OT role/POC.   Importance of OOB activity with staff assistance.  Importance of sitting up in the chair throughout the day as tolerated, especially for meals   Safety during functional  t/f and mobility  Importance of assisting with self-care activities     Patient completed the following for increased strength to increase I with ADLs: UBE 8 min x 1x, 3# dowel- shoulder press, chest press, bicep curls x 40, red theraflex x 40 both ways, green theraband- scapular retraction x 40      Patient left up in chair with  PTA notified    GOALS:   Multidisciplinary Problems       Occupational Therapy Goals          Problem: Occupational Therapy    Goal Priority Disciplines Outcome Interventions   Occupational Therapy Goal     OT, PT/OT     Description: Description: Grooming Status:   Short Term Goal: Pt will perform grooming with s/u sitting EOB.   Long Term Goal: Pt will perform grooming/oral hygiene standing at sink with Mod I      LE dressing Status:   Short Term Goal: Pt will perform LE dressing with mod a.   Long Term Goal: Pt will perform LE dressing with min a.    Toileting Status:   Short Term Goal: Pt will perform toilet hygiene on BSC with min a.  Long Term Goal: Pt will perform toilet hygiene on toilet with no AE with s/u.    Commode Transfer:   Short Term Goal: Pt will perform BSC t/f with min a.  Long Term Goal:  Pt will perform toilet t/f in bathroom with s/u.     Bathing Status:   Long Term Goal: Pt will perform sponge bath with s/u with no unsafe fatigue.     Strength Status:   Long Term Goal: Pt to perform BUE strengthening with weights and/or body weight to increase ADL independence and safety    Endurance Status:   Short Term Goal:pt to perform 15 min OT treatment with 5 or greater rest breaks  Long Term Goal: pt to perform 30 min OT treat with 3 or less rest breaks                       Time Tracking:     OT Date of Treatment: 05/22/24  OT Start Time: 1318  OT Stop Time: 1352  OT Total Time (min): 34 min    Billable Minutes:Therapeutic Exercise 34 min  Jud Kaur OTR/L      5/22/2024

## 2024-05-22 NOTE — HOSPITAL COURSE
5/22/24 - pt. Is doing well so far. Voices no complaint. Will continue present treatment.    5/24/24 - pt. Is doing well. Eating. Now BS's are elevating. Orders revised.    5/28/24 - doing well. No complaints voiced. Will continue present treatment.    5/31/24 - Doing well. No complaints voiced. Will continue present treatment.    6/3/24 - not eating. Pt. Requesting something for appetite. Orders revised.    6/7/24 - pt. Is ready to go home. He is to see his PCP next week and get a referral for a GI consult concerning dysphagia to solid food. He will continue PT as an outpatient.

## 2024-05-22 NOTE — PT/OT/SLP PROGRESS
Physical Therapy Treatment    Patient Name:  Milton Johnson   MRN:  [id number]    Recommendations:     Discharge Recommendations: Low Intensity Therapy  Discharge Equipment Recommendations: 3-in-1 commode, walker, rolling  Barriers to discharge: None    Assessment:     Milton Johnson is a 78 y.o. male admitted with a medical diagnosis of Muscular weakness.  He presents with the following impairments/functional limitations: weakness, impaired self care skills, impaired functional mobility, gait instability, decreased upper extremity function, decreased lower extremity function, orthopedic precautions .  Patient requires mod verbal and visual cues to progress through completion of Therapeutic exercises with proper alignment, speed of movement, count, and hold times.   Patient requires min cues x 3 to increase safety during sit to stand to sit transfers.  Min assistance with tactile cues provided during gait training due to gait instability.  No adverse effects noted during physical therapy treatment session.     Rehab Prognosis: Good; patient would benefit from acute skilled PT services to address these deficits and reach maximum level of function.    Recent Surgery: * No surgery found *      Plan:     During this hospitalization, patient to be seen 5 x/week to address the identified rehab impairments via gait training, therapeutic activities, therapeutic exercises and progress toward the following goals:    Plan of Care Expires:  06/11/24    Subjective     Chief Complaint: Decreased mobility   Patient/Family Comments/goals: Get stronger   Pain/Comfort:  Pain Rating 1: 3/10  Location - Side 1: Right  Location - Orientation 1: generalized  Location 1: hip      Objective:     Communicated with supervising physical therapist, Katelynn Waddell DPT and skilled nursing prior to session.  Patient found up in chair with   upon PT entry to room.     General Precautions: Standard, fall  Orthopedic Precautions: RLE weight bearing  "as tolerated  Braces: N/A  Respiratory Status: Room air     Functional Mobility:  Transfers:     Sit to Stand:  contact guard assistance with rolling walker  Gait: Approx. 45' with RW and min assist x 2 on level indoor surface with verbal and visual cues for proper AD/LE sequencing.        AM-PAC 6 CLICK MOBILITY  Turning over in bed (including adjusting bedclothes, sheets and blankets)?: 3  Sitting down on and standing up from a chair with arms (e.g., wheelchair, bedside commode, etc.): 3  Moving from lying on back to sitting on the side of the bed?: 3  Moving to and from a bed to a chair (including a wheelchair)?: 3  Need to walk in hospital room?: 3  Climbing 3-5 steps with a railing?: 1 (Not attempted)  Basic Mobility Total Score: 16       Treatment & Education:  Ther-Ex Reps       Ankle pumps 30 x    Quad sets 30 x 3"   Horizontal Hip Abduction/Hip ADD 3 x 10, Right LE    Hip ADD 3 x 10 *   Heelslides 2 x 10   LAQ's 2 x 10   Hamstring curls    Seated hip rotation 2 x 10    Hip ABD    Quad sets  30 x 3" *                 Patient left up in chair with call button in reach..    GOALS:   Multidisciplinary Problems       Physical Therapy Goals          Problem: Physical Therapy    Goal Priority Disciplines Outcome Goal Variances Interventions   Physical Therapy Goal     PT, PT/OT      Description: Short Term Goals  1. Patient will complete 30 reps of B LE exercises with correct form.   2. Patient will complete sit<>stand transfers with SBA.  3. Patient will ambulate 100 feet with RW on level surfaces with CGA.     Long Term Goals   1. Patient will ambulate 300 feet with RW on level and unlevel surfaces with SBA.  2. Patient will complete all functional transfers with MOD I.  3. Patient will negotiate up and down 2 stairs with use of handrail with SBA.                        Time Tracking:     PT Received On: 05/22/24  PT Start Time: 1417     PT Stop Time: 1444  PT Total Time (min): 27 min     Billable Minutes: Gait " Training 8, Therapeutic Activity 0, and Therapeutic Exercise 19    Treatment Type: Treatment  PT/PTA: PTA     Number of PTA visits since last PT visit: 1     Continue Plan of Care Per PT order to progress patient toward rehab goals as tolerated by patient.   ROSALVA Alcantara   05/22/2024

## 2024-05-22 NOTE — NURSING
Pt requesting an enema to help him have a BM. Enema ordered. Enema given, no results. Water returned mostly clear with particles of stool . Roque well.

## 2024-05-22 NOTE — PLAN OF CARE
Problem: Adult Inpatient Plan of Care  Goal: Plan of Care Review  Outcome: Progressing  Goal: Patient-Specific Goal (Individualized)  Outcome: Progressing  Goal: Absence of Hospital-Acquired Illness or Injury  Outcome: Progressing  Goal: Optimal Comfort and Wellbeing  Outcome: Progressing  Goal: Readiness for Transition of Care  Outcome: Progressing     Problem: Diabetes Comorbidity  Goal: Blood Glucose Level Within Targeted Range  Outcome: Progressing     Problem: Pain Acute  Goal: Optimal Pain Control and Function  Outcome: Progressing     Problem: Fall Injury Risk  Goal: Absence of Fall and Fall-Related Injury  Outcome: Progressing     Problem: Infection  Goal: Absence of Infection Signs and Symptoms  Outcome: Progressing     Problem: Wound  Goal: Optimal Coping  Outcome: Progressing  Goal: Optimal Functional Ability  Outcome: Progressing  Goal: Absence of Infection Signs and Symptoms  Outcome: Progressing  Goal: Improved Oral Intake  Outcome: Progressing  Goal: Optimal Pain Control and Function  Outcome: Progressing  Goal: Skin Health and Integrity  Outcome: Progressing  Goal: Optimal Wound Healing  Outcome: Progressing

## 2024-05-23 LAB
ANION GAP SERPL CALCULATED.3IONS-SCNC: 11 MMOL/L (ref 7–16)
BASOPHILS # BLD AUTO: 0.07 K/UL (ref 0–0.2)
BASOPHILS NFR BLD AUTO: 0.6 % (ref 0–1)
BUN SERPL-MCNC: 21 MG/DL (ref 7–18)
BUN/CREAT SERPL: 15 (ref 6–20)
CALCIUM SERPL-MCNC: 9.4 MG/DL (ref 8.5–10.1)
CHLORIDE SERPL-SCNC: 90 MMOL/L (ref 98–107)
CO2 SERPL-SCNC: 26 MMOL/L (ref 21–32)
CREAT SERPL-MCNC: 1.42 MG/DL (ref 0.7–1.3)
DIFFERENTIAL METHOD BLD: ABNORMAL
EGFR (NO RACE VARIABLE) (RUSH/TITUS): 51 ML/MIN/1.73M2
EOSINOPHIL # BLD AUTO: 0.26 K/UL (ref 0–0.5)
EOSINOPHIL NFR BLD AUTO: 2.1 % (ref 1–4)
ERYTHROCYTE [DISTWIDTH] IN BLOOD BY AUTOMATED COUNT: 12.4 % (ref 11.5–14.5)
GLUCOSE SERPL-MCNC: 202 MG/DL (ref 74–106)
GLUCOSE SERPL-MCNC: 212 MG/DL (ref 70–105)
GLUCOSE SERPL-MCNC: 271 MG/DL (ref 70–105)
GLUCOSE SERPL-MCNC: 293 MG/DL (ref 70–105)
GLUCOSE SERPL-MCNC: 351 MG/DL (ref 70–105)
HCT VFR BLD AUTO: 32.5 % (ref 40–54)
HGB BLD-MCNC: 11.5 G/DL (ref 13.5–18)
IMM GRANULOCYTES # BLD AUTO: 0.09 K/UL (ref 0–0.04)
IMM GRANULOCYTES NFR BLD: 0.7 % (ref 0–0.4)
LYMPHOCYTES # BLD AUTO: 1.36 K/UL (ref 1–4.8)
LYMPHOCYTES NFR BLD AUTO: 10.9 % (ref 27–41)
MCH RBC QN AUTO: 29.8 PG (ref 27–31)
MCHC RBC AUTO-ENTMCNC: 35.4 G/DL (ref 32–36)
MCV RBC AUTO: 84.2 FL (ref 80–96)
MONOCYTES # BLD AUTO: 0.95 K/UL (ref 0–0.8)
MONOCYTES NFR BLD AUTO: 7.6 % (ref 2–6)
MPC BLD CALC-MCNC: 9 FL (ref 9.4–12.4)
NEUTROPHILS # BLD AUTO: 9.72 K/UL (ref 1.8–7.7)
NEUTROPHILS NFR BLD AUTO: 78.1 % (ref 53–65)
NRBC # BLD AUTO: 0 X10E3/UL
NRBC, AUTO (.00): 0 %
PLATELET # BLD AUTO: 281 K/UL (ref 150–400)
POTASSIUM SERPL-SCNC: 3.3 MMOL/L (ref 3.5–5.1)
RBC # BLD AUTO: 3.86 M/UL (ref 4.6–6.2)
SODIUM SERPL-SCNC: 124 MMOL/L (ref 136–145)
WBC # BLD AUTO: 12.45 K/UL (ref 4.5–11)

## 2024-05-23 PROCEDURE — 11000004 HC SNF PRIVATE

## 2024-05-23 PROCEDURE — 80048 BASIC METABOLIC PNL TOTAL CA: CPT | Performed by: FAMILY MEDICINE

## 2024-05-23 PROCEDURE — 82962 GLUCOSE BLOOD TEST: CPT

## 2024-05-23 PROCEDURE — 36415 COLL VENOUS BLD VENIPUNCTURE: CPT | Performed by: FAMILY MEDICINE

## 2024-05-23 PROCEDURE — 97116 GAIT TRAINING THERAPY: CPT | Mod: CQ

## 2024-05-23 PROCEDURE — 97110 THERAPEUTIC EXERCISES: CPT | Mod: CQ

## 2024-05-23 PROCEDURE — 94761 N-INVAS EAR/PLS OXIMETRY MLT: CPT

## 2024-05-23 PROCEDURE — 25000003 PHARM REV CODE 250: Performed by: FAMILY MEDICINE

## 2024-05-23 PROCEDURE — 63600175 PHARM REV CODE 636 W HCPCS: Performed by: FAMILY MEDICINE

## 2024-05-23 PROCEDURE — 85025 COMPLETE CBC W/AUTO DIFF WBC: CPT | Performed by: FAMILY MEDICINE

## 2024-05-23 PROCEDURE — 97110 THERAPEUTIC EXERCISES: CPT

## 2024-05-23 PROCEDURE — 36416 COLLJ CAPILLARY BLOOD SPEC: CPT

## 2024-05-23 RX ADMIN — ASPIRIN 81 MG: 81 TABLET, COATED ORAL at 09:05

## 2024-05-23 RX ADMIN — CARVEDILOL 3.12 MG: 3.12 TABLET, FILM COATED ORAL at 09:05

## 2024-05-23 RX ADMIN — SENNOSIDES AND DOCUSATE SODIUM 1 TABLET: 8.6; 5 TABLET ORAL at 09:05

## 2024-05-23 RX ADMIN — PIOGLITAZONE 15 MG: 15 TABLET ORAL at 09:05

## 2024-05-23 RX ADMIN — AMLODIPINE BESYLATE 10 MG: 10 TABLET ORAL at 09:05

## 2024-05-23 RX ADMIN — SENNOSIDES AND DOCUSATE SODIUM 1 TABLET: 8.6; 5 TABLET ORAL at 08:05

## 2024-05-23 RX ADMIN — HYDROCHLOROTHIAZIDE 25 MG: 25 TABLET ORAL at 09:05

## 2024-05-23 RX ADMIN — POTASSIUM CHLORIDE 20 MEQ: 1500 TABLET, EXTENDED RELEASE ORAL at 08:05

## 2024-05-23 RX ADMIN — NITROFURANTOIN MONOHYDRATE/MACROCRYSTALS 100 MG: 25; 75 CAPSULE ORAL at 08:05

## 2024-05-23 RX ADMIN — OXYCODONE HYDROCHLORIDE AND ACETAMINOPHEN 1 TABLET: 10; 325 TABLET ORAL at 04:05

## 2024-05-23 RX ADMIN — CARVEDILOL 3.12 MG: 3.12 TABLET, FILM COATED ORAL at 04:05

## 2024-05-23 RX ADMIN — LACTULOSE 30 G: 20 SOLUTION ORAL at 08:05

## 2024-05-23 RX ADMIN — DORZOLAMIDE HCL 1 DROP: 20 SOLUTION/ DROPS OPHTHALMIC at 09:05

## 2024-05-23 RX ADMIN — POTASSIUM CHLORIDE 20 MEQ: 1500 TABLET, EXTENDED RELEASE ORAL at 09:05

## 2024-05-23 RX ADMIN — BIMATOPROST 1 DROP: 0.1 SOLUTION/ DROPS OPHTHALMIC at 08:05

## 2024-05-23 RX ADMIN — NITROFURANTOIN MONOHYDRATE/MACROCRYSTALS 100 MG: 25; 75 CAPSULE ORAL at 09:05

## 2024-05-23 RX ADMIN — CHOLECALCIFEROL TAB 125 MCG (5000 UNIT) 5000 UNITS: 125 TAB at 09:05

## 2024-05-23 RX ADMIN — LOSARTAN POTASSIUM 100 MG: 100 TABLET, FILM COATED ORAL at 09:05

## 2024-05-23 RX ADMIN — ATORVASTATIN CALCIUM 80 MG: 40 TABLET, FILM COATED ORAL at 08:05

## 2024-05-23 RX ADMIN — ASPIRIN 81 MG: 81 TABLET, COATED ORAL at 08:05

## 2024-05-23 RX ADMIN — DORZOLAMIDE HCL 1 DROP: 20 SOLUTION/ DROPS OPHTHALMIC at 08:05

## 2024-05-23 RX ADMIN — INSULIN DETEMIR 30 UNITS: 100 INJECTION, SOLUTION SUBCUTANEOUS at 08:05

## 2024-05-23 RX ADMIN — OXYCODONE HYDROCHLORIDE AND ACETAMINOPHEN 1 TABLET: 10; 325 TABLET ORAL at 10:05

## 2024-05-23 RX ADMIN — POLYETHYLENE GLYCOL 3350 17 G: 17 POWDER, FOR SOLUTION ORAL at 09:05

## 2024-05-23 NOTE — NURSING
Patient Rounds  Supper tray delivered/set up. Routine med given. Spouse at bedside.   by Leai Hamilton, KRUPA at 5/23/24 1705   Patient Rounds  Accu check is 212, no SSI ordered   by Leia Hamilton RN at 5/23/24 1643   Patient Rounds   by Yoly Alford, Patient Care Assistant at 5/23/24 1603   Patient Rounds  Lying in bed talking with vistors at bedside. No needs/complaints voiced.   by Leia Hamilton RN at 5/23/24 1551   Patient Rounds   by Yoly Alford, Patient Care Assistant at 5/23/24 1447   Patient Rounds  Lying in bed reading. NADN. Refilled ice/water pitcher per request. No other needs/complaints voiced. No family at bedside   by Leia Hamilton RN at 5/23/24 1346   Patient Rounds   by Yoly Alford, Patient Care Assistant at 5/23/24 1259   Patient Rounds  Lunch tray delivered/set up   by Leia Hamilton RN at 5/23/24 1105   Patient Rounds  Accu check 289, no SSI ordered   by Leia Hamilton RN at 5/23/24 1049   Patient Rounds   by Yoly Alford, Patient Care Assistant at 5/23/24 1024   Patient Rounds  Lying in bed talking with visitors at bedside. NADN. Routine meds given. No needs/complaints voiced.   by Leia Hamilton RN at 5/23/24 0939   Patient Rounds   by Yoly Alford, Patient Care Assistant at 5/23/24 0847   Patient Rounds  Patient out of the room to therapy   by Leia Hamilton, RN at 5/23/24 0800   Patient Rounds  In chair awake. NADN. No c/o pain to R hip, dsg noted c/d/i. No family at bedside. No needs/complaints voiced.   by Leia Hamilton RN at 5/23/24 0750

## 2024-05-23 NOTE — PLAN OF CARE
Problem: Diabetes Comorbidity  Goal: Blood Glucose Level Within Targeted Range  5/22/2024 2219 by Julieth Dasilva RN  Outcome: Progressing  5/22/2024 2219 by Julieth Dasilva RN  Outcome: Progressing     Problem: Pain Acute  Goal: Optimal Pain Control and Function  5/22/2024 2219 by Julieth Dasilva RN  Outcome: Progressing  5/22/2024 2219 by Julieth Dasilva RN  Outcome: Progressing     Problem: Fall Injury Risk  Goal: Absence of Fall and Fall-Related Injury  5/22/2024 2219 by Julieth Dasilva RN  Outcome: Progressing  5/22/2024 2219 by Julieth Dasilva RN  Outcome: Progressing     Problem: Infection  Goal: Absence of Infection Signs and Symptoms  5/22/2024 2219 by Julieth Dasilva RN  Outcome: Progressing  5/22/2024 2219 by Julieth Dasilva RN  Outcome: Progressing     Problem: Wound  Goal: Optimal Coping  5/22/2024 2219 by Julieth Dasilva RN  Outcome: Progressing  5/22/2024 2219 by Julieth Dasilva RN  Outcome: Progressing  Goal: Optimal Functional Ability  5/22/2024 2219 by Julieth Dasilva RN  Outcome: Progressing  5/22/2024 2219 by Julieth Dasilva RN  Outcome: Progressing  Goal: Absence of Infection Signs and Symptoms  5/22/2024 2219 by Julieth Dasilva RN  Outcome: Progressing  5/22/2024 2219 by Julieth Dasilva RN  Outcome: Progressing  Goal: Improved Oral Intake  5/22/2024 2219 by Julieth Dasilva RN  Outcome: Progressing  5/22/2024 2219 by Julieth Dasilva RN  Outcome: Progressing  Goal: Optimal Pain Control and Function  5/22/2024 2219 by Julieth Dasilva RN  Outcome: Progressing  5/22/2024 2219 by Julieth Dasilva RN  Outcome: Progressing  Goal: Skin Health and Integrity  5/22/2024 2219 by Julieth Dasilva RN  Outcome: Progressing  5/22/2024 2219 by Brown, Seketha R, RN  Outcome: Progressing  Goal: Optimal Wound Healing  Outcome: Progressing     Problem: VTE (Venous Thromboembolism)  Goal: Tissue Perfusion  5/22/2024 2219 by Julieth Dasilva RN  Outcome:  Progressing  5/22/2024 2219 by Julieth Dasilva, RN  Outcome: Progressing

## 2024-05-23 NOTE — PT/OT/SLP PROGRESS
Occupational Therapy   Treatment    Name: Milton Johnson  MRN: [id number]  Admitting Diagnosis:  Muscular weakness       Recommendations:     Discharge Recommendations:    Discharge Equipment Recommendations:  3-in-1 commode, walker, rolling  Barriers to discharge:       Assessment:     Milton Johnson is a 78 y.o. male with a medical diagnosis of Muscular weakness.   Performance deficits affecting function are weakness, impaired endurance, impaired self care skills, impaired functional mobility, impaired balance, decreased lower extremity function, decreased safety awareness.     Rehab Prognosis:  Good; patient would benefit from acute skilled OT services to address these deficits and reach maximum level of function.       Plan:     Patient to be seen 5 x/week to address the above listed problems via therapeutic exercises, therapeutic activities, self-care/home management  Plan of Care Expires:    Plan of Care Reviewed with: patient    Subjective     Chief Complaint: Decreased mobility  Patient/Family Comments/goals: Get stronger  Pain/Comfort:  Pain Rating 1: 3/10    Objective:     Communicated with: PTA prior to session.  Patient found up in chair with   upon OT entry to room.    General Precautions: Standard, fall    Orthopedic Precautions:   Braces:    Respiratory Status: Room air     Occupational Performance:     Bed Mobility:    Patient was up in his chair already    Functional Mobility/Transfers:  Patient completed Sit <> Stand Transfer with minimum assistance  with  rolling walker   Functional Mobility: min a with RW    Activities of Daily Living:  Feeding S/u   Grooming S/u   Bathing Min a   UB dsg S/u   LB dsg Min a   Toileting Min a   Toilet t/f Min a           AMPAC 6 Click ADL: 20    Treatment & Education:  Pt educated on OT role/POC.   Importance of OOB activity with staff assistance.  Importance of sitting up in the chair throughout the day as tolerated, especially for meals   Safety during functional  t/f and mobility  Importance of assisting with self-care activities     Patient completed the following for increased strength to increase I with ADLs: UBE 8 min x 1x, 4# dowel- shoulder press, chest press, bicep curls x 40, red theraflex x 40 both ways, green theraband- scapular retraction x 40      Patient left up in chair with call button in reach and chair alarm on    GOALS:   Multidisciplinary Problems       Occupational Therapy Goals          Problem: Occupational Therapy    Goal Priority Disciplines Outcome Interventions   Occupational Therapy Goal     OT, PT/OT     Description: Description: Grooming Status:   Short Term Goal: Pt will perform grooming with s/u sitting EOB.   Long Term Goal: Pt will perform grooming/oral hygiene standing at sink with Mod I      LE dressing Status:   Short Term Goal: Pt will perform LE dressing with mod a.   Long Term Goal: Pt will perform LE dressing with min a.    Toileting Status:   Short Term Goal: Pt will perform toilet hygiene on BSC with min a.  Long Term Goal: Pt will perform toilet hygiene on toilet with no AE with s/u.    Commode Transfer:   Short Term Goal: Pt will perform BSC t/f with min a.  Long Term Goal:  Pt will perform toilet t/f in bathroom with s/u.     Bathing Status:   Long Term Goal: Pt will perform sponge bath with s/u with no unsafe fatigue.     Strength Status:   Long Term Goal: Pt to perform BUE strengthening with weights and/or body weight to increase ADL independence and safety    Endurance Status:   Short Term Goal:pt to perform 15 min OT treatment with 5 or greater rest breaks  Long Term Goal: pt to perform 30 min OT treat with 3 or less rest breaks                       Time Tracking:     OT Date of Treatment: 05/23/24  OT Start Time: 0854  OT Stop Time: 0921  OT Total Time (min): 27 min    Billable Minutes:Therapeutic Exercise 27 min  Jud Kaur OTR/L      5/23/2024

## 2024-05-23 NOTE — PLAN OF CARE
Problem: Adult Inpatient Plan of Care  Goal: Plan of Care Review  Outcome: Progressing  Goal: Patient-Specific Goal (Individualized)  Outcome: Progressing     Problem: Fall Injury Risk  Goal: Absence of Fall and Fall-Related Injury  Outcome: Progressing  Intervention: Identify and Manage Contributors  Flowsheets (Taken 5/23/2024 1720)  Self-Care Promotion:   independence encouraged   BADL personal objects within reach   BADL personal routines maintained  Medication Review/Management:   medications reviewed   high-risk medications identified  Intervention: Promote Injury-Free Environment  Flowsheets (Taken 5/23/2024 1720)  Safety Promotion/Fall Prevention:   assistive device/personal item within reach   bed alarm refused   diversional activities provided   Fall Risk reviewed with patient/family   Fall Risk signage in place   high risk medications identified   in recliner, wheels locked   medications reviewed   muscle strengthening facilitated   nonskid shoes/socks when out of bed   side rails raised x 3   instructed to call staff for mobility

## 2024-05-23 NOTE — NURSING
Spoke with Dr. Whiteside related to pt has had no bm, after tx with enema, lactulose, md states to f/u with Dr. Walker in the am.

## 2024-05-23 NOTE — PT/OT/SLP PROGRESS
Physical Therapy Treatment    Patient Name:  Milton Johnson   MRN:  [id number]    Recommendations:     Discharge Recommendations: Low Intensity Therapy  Discharge Equipment Recommendations: 3-in-1 commode, walker, rolling  Barriers to discharge: None    Assessment:     Milton Johnson is a 78 y.o. male admitted with a medical diagnosis of Muscular weakness.  He presents with the following impairments/functional limitations: weakness, impaired endurance, impaired self care skills, impaired functional mobility, gait instability, impaired balance, decreased upper extremity function, decreased lower extremity function, pain, orthopedic precautions .  Patient requires mod verbal and visual cues to progress through completion of Therapeutic exercises with proper alignment, speed of movement, count, and hold times.   Patient requires less cues to increase safety during sit to stand to sit transfers.  CGA with occasional tactile cues provided during gait training due to gait instability.  No adverse effects noted during physical therapy treatment session.     Rehab Prognosis: Good; patient would benefit from acute skilled PT services to address these deficits and reach maximum level of function.    Recent Surgery: * No surgery found *      Plan:     During this hospitalization, patient to be seen 5 x/week to address the identified rehab impairments via gait training, therapeutic activities, therapeutic exercises and progress toward the following goals:    Plan of Care Expires:  06/11/24    Subjective     Chief Complaint: Decreased mobility   Patient/Family Comments/goals: Get stronger   Pain/Comfort:  Pain Rating 1: 0/10      Objective:     Communicated with supervising physical therapist, Katelynn Waddell DPT and skilled nursing prior to session.  Patient found up in chair with   upon PT entry to room.     General Precautions: Standard, fall  Orthopedic Precautions: RLE weight bearing as tolerated  Braces: N/A  Respiratory  "Status: Room air     Functional Mobility:  Transfers:     Sit to Stand:  contact guard assistance with rolling walker  Gait: Approx. 45' with RW and min assist x 2 on level indoor surface with verbal and visual cues for proper AD/LE sequencing.        AM-PAC 6 CLICK MOBILITY  Turning over in bed (including adjusting bedclothes, sheets and blankets)?: 3  Sitting down on and standing up from a chair with arms (e.g., wheelchair, bedside commode, etc.): 3  Moving from lying on back to sitting on the side of the bed?: 3  Moving to and from a bed to a chair (including a wheelchair)?: 3  Need to walk in hospital room?: 3  Climbing 3-5 steps with a railing?: 1 (Not completed)  Basic Mobility Total Score: 16       Treatment & Education:  Ther-Ex Reps       Ankle pumps 30 x    Quad sets 30 x 3"   Horizontal Hip Abduction/Hip ADD 3 x 10, Right LE    Hip ADD 3 x 10 *   Heelslides    LAQ's 2 x 10   Hamstring curls    Seated hip rotation 2 x 10    Hip ABD    Quad sets  30 x 3" *                 Therapeutic activities to improve functional performance:  Right LE only, 2 x 10:  hip and knee flexion, hip abduction, standing straight leg raises ;  sit to stand reps 10     Patient left up in chair with call button in reach..    GOALS:   Multidisciplinary Problems       Physical Therapy Goals          Problem: Physical Therapy    Goal Priority Disciplines Outcome Goal Variances Interventions   Physical Therapy Goal     PT, PT/OT      Description: Short Term Goals  1. Patient will complete 30 reps of B LE exercises with correct form.   2. Patient will complete sit<>stand transfers with SBA.  3. Patient will ambulate 100 feet with RW on level surfaces with CGA.     Long Term Goals   1. Patient will ambulate 300 feet with RW on level and unlevel surfaces with SBA.  2. Patient will complete all functional transfers with MOD I.  3. Patient will negotiate up and down 2 stairs with use of handrail with SBA.                        Time " Tracking:     PT Received On: 05/23/24  PT Start Time: 0810     PT Stop Time: 0844  PT Total Time (min): 34 min     Billable Minutes: Gait 8 ;  There Ex 17; Therapeutic activities 5      Treatment Type: Treatment  PT/PTA: PTA     Number of PTA visits since last PT visit: 2     Continue Plan of Care Per PT order to progress patient toward rehab goals as tolerated by patient.   ROSALVA Alcantara   05/23/2024

## 2024-05-24 LAB
GLUCOSE SERPL-MCNC: 133 MG/DL (ref 70–105)
GLUCOSE SERPL-MCNC: 213 MG/DL (ref 70–105)
GLUCOSE SERPL-MCNC: 217 MG/DL (ref 70–105)
GLUCOSE SERPL-MCNC: 280 MG/DL (ref 70–105)

## 2024-05-24 PROCEDURE — 63600175 PHARM REV CODE 636 W HCPCS: Performed by: FAMILY MEDICINE

## 2024-05-24 PROCEDURE — 82962 GLUCOSE BLOOD TEST: CPT

## 2024-05-24 PROCEDURE — 25000003 PHARM REV CODE 250: Performed by: EMERGENCY MEDICINE

## 2024-05-24 PROCEDURE — 11000004 HC SNF PRIVATE

## 2024-05-24 PROCEDURE — 99308 SBSQ NF CARE LOW MDM 20: CPT | Mod: ,,, | Performed by: FAMILY MEDICINE

## 2024-05-24 PROCEDURE — 94761 N-INVAS EAR/PLS OXIMETRY MLT: CPT

## 2024-05-24 PROCEDURE — 97110 THERAPEUTIC EXERCISES: CPT | Mod: CQ

## 2024-05-24 PROCEDURE — 97110 THERAPEUTIC EXERCISES: CPT

## 2024-05-24 PROCEDURE — 97116 GAIT TRAINING THERAPY: CPT | Mod: CQ

## 2024-05-24 PROCEDURE — 25000003 PHARM REV CODE 250: Performed by: FAMILY MEDICINE

## 2024-05-24 RX ORDER — SULFAMETHOXAZOLE AND TRIMETHOPRIM 800; 160 MG/1; MG/1
1 TABLET ORAL 2 TIMES DAILY
Status: COMPLETED | OUTPATIENT
Start: 2024-05-24 | End: 2024-06-03

## 2024-05-24 RX ORDER — METFORMIN HYDROCHLORIDE 500 MG/1
500 TABLET ORAL 2 TIMES DAILY WITH MEALS
Status: DISCONTINUED | OUTPATIENT
Start: 2024-05-24 | End: 2024-06-07 | Stop reason: HOSPADM

## 2024-05-24 RX ADMIN — METFORMIN HYDROCHLORIDE 500 MG: 500 TABLET, FILM COATED ORAL at 05:05

## 2024-05-24 RX ADMIN — PIOGLITAZONE 15 MG: 15 TABLET ORAL at 08:05

## 2024-05-24 RX ADMIN — DORZOLAMIDE HCL 1 DROP: 20 SOLUTION/ DROPS OPHTHALMIC at 08:05

## 2024-05-24 RX ADMIN — METFORMIN HYDROCHLORIDE 500 MG: 500 TABLET, FILM COATED ORAL at 08:05

## 2024-05-24 RX ADMIN — NITROFURANTOIN MONOHYDRATE/MACROCRYSTALS 100 MG: 25; 75 CAPSULE ORAL at 08:05

## 2024-05-24 RX ADMIN — SENNOSIDES AND DOCUSATE SODIUM 1 TABLET: 8.6; 5 TABLET ORAL at 08:05

## 2024-05-24 RX ADMIN — ASPIRIN 81 MG: 81 TABLET, COATED ORAL at 08:05

## 2024-05-24 RX ADMIN — SULFAMETHOXAZOLE AND TRIMETHOPRIM 1 TABLET: 800; 160 TABLET ORAL at 08:05

## 2024-05-24 RX ADMIN — CARVEDILOL 3.12 MG: 3.12 TABLET, FILM COATED ORAL at 05:05

## 2024-05-24 RX ADMIN — POTASSIUM CHLORIDE 20 MEQ: 1500 TABLET, EXTENDED RELEASE ORAL at 08:05

## 2024-05-24 RX ADMIN — INSULIN DETEMIR 30 UNITS: 100 INJECTION, SOLUTION SUBCUTANEOUS at 08:05

## 2024-05-24 RX ADMIN — CHOLECALCIFEROL TAB 125 MCG (5000 UNIT) 5000 UNITS: 125 TAB at 08:05

## 2024-05-24 RX ADMIN — CARVEDILOL 3.12 MG: 3.12 TABLET, FILM COATED ORAL at 08:05

## 2024-05-24 RX ADMIN — AMLODIPINE BESYLATE 10 MG: 10 TABLET ORAL at 08:05

## 2024-05-24 RX ADMIN — HYDROCHLOROTHIAZIDE 25 MG: 25 TABLET ORAL at 08:05

## 2024-05-24 RX ADMIN — BIMATOPROST 1 DROP: 0.1 SOLUTION/ DROPS OPHTHALMIC at 08:05

## 2024-05-24 RX ADMIN — LOSARTAN POTASSIUM 100 MG: 100 TABLET, FILM COATED ORAL at 08:05

## 2024-05-24 RX ADMIN — POLYETHYLENE GLYCOL 3350 17 G: 17 POWDER, FOR SOLUTION ORAL at 08:05

## 2024-05-24 RX ADMIN — ATORVASTATIN CALCIUM 80 MG: 40 TABLET, FILM COATED ORAL at 08:05

## 2024-05-24 NOTE — PLAN OF CARE
Problem: Adult Inpatient Plan of Care  Goal: Plan of Care Review  Outcome: Progressing  Goal: Patient-Specific Goal (Individualized)  Outcome: Progressing  Goal: Absence of Hospital-Acquired Illness or Injury  Outcome: Progressing  Goal: Optimal Comfort and Wellbeing  Outcome: Progressing  Goal: Readiness for Transition of Care  Outcome: Progressing     Problem: Diabetes Comorbidity  Goal: Blood Glucose Level Within Targeted Range  Outcome: Progressing     Problem: Pain Acute  Goal: Optimal Pain Control and Function  Outcome: Progressing     Problem: Fall Injury Risk  Goal: Absence of Fall and Fall-Related Injury  Outcome: Progressing     Problem: Infection  Goal: Absence of Infection Signs and Symptoms  Outcome: Progressing     Problem: Wound  Goal: Optimal Coping  Outcome: Progressing  Goal: Optimal Functional Ability  Outcome: Progressing  Goal: Absence of Infection Signs and Symptoms  Outcome: Progressing  Goal: Improved Oral Intake  Outcome: Progressing  Goal: Optimal Pain Control and Function  Outcome: Progressing  Goal: Skin Health and Integrity  Outcome: Progressing  Goal: Optimal Wound Healing  Outcome: Progressing     Problem: VTE (Venous Thromboembolism)  Goal: Tissue Perfusion  Outcome: Progressing     Problem: Constipation  Goal: Effective Bowel Elimination  Outcome: Progressing

## 2024-05-24 NOTE — PT/OT/SLP PROGRESS
Physical Therapy Treatment    Patient Name:  Milton Johnson   MRN:  [id number]    Recommendations:     Discharge Recommendations: Low Intensity Therapy  Discharge Equipment Recommendations: 3-in-1 commode, walker, rolling  Barriers to discharge: None    Assessment:     Milton Johnson is a 78 y.o. male admitted with a medical diagnosis of Muscular weakness.  He presents with the following impairments/functional limitations: weakness, impaired endurance, impaired self care skills, impaired functional mobility, gait instability, orthopedic precautions .  Patient requires mod verbal and visual cues to progress through completion of Therapeutic exercises with proper alignment, speed of movement, count, and hold times.   Patient able to increase distance during gait training .  Patient continues to require constant tactile cues to prevent LOB due to unsteady gait.       Rehab Prognosis: Good; patient would benefit from acute skilled PT services to address these deficits and reach maximum level of function.    Recent Surgery: * No surgery found *      Plan:     During this hospitalization, patient to be seen 5 x/week to address the identified rehab impairments via gait training, therapeutic activities, therapeutic exercises and progress toward the following goals:    Plan of Care Expires:  06/11/24    Subjective     Chief Complaint: Decreased mobility   Patient/Family Comments/goals: Get stronger   Pain/Comfort:  Pain Rating 1: 0/10      Objective:     Communicated with supervising physical therapist, Katelynn Waddell DPT and skilled nursing prior to session.  Patient found up in chair with in rehab gym after completing occupational therapy session.     General Precautions: Standard, fall  Orthopedic Precautions: RLE weight bearing as tolerated  Braces: N/A  Respiratory Status: Room air     Functional Mobility:  Transfers:     Sit to Stand:  contact guard assistance with rolling walker  Gait: Approx. 120'  with RW and min  "assist x 1 on level indoor surface with verbal and visual cues for proper AD/LE sequencing.        AM-PAC 6 CLICK MOBILITY  Turning over in bed (including adjusting bedclothes, sheets and blankets)?: 3  Sitting down on and standing up from a chair with arms (e.g., wheelchair, bedside commode, etc.): 3  Moving from lying on back to sitting on the side of the bed?: 3  Moving to and from a bed to a chair (including a wheelchair)?: 3  Need to walk in hospital room?: 3  Climbing 3-5 steps with a railing?: 1  Basic Mobility Total Score: 16       Treatment & Education:  Ther-Ex Reps       Ankle pumps 30 x    Quad sets 30 x 3"   Horizontal Hip Abduction/Hip ADD 3 x 10, Right LE    Hip ADD 3 x 10 *   Heelslides 2 x 10   LAQ's 2 x 10   Hamstring curls 2 x 10 green *    Seated hip rotation    Hip ABD    Quad sets  30 x 3" *    Supine straight leg raises'  10 x active assisted *             Patient left up in chair with call button in reach..    GOALS:   Multidisciplinary Problems       Physical Therapy Goals          Problem: Physical Therapy    Goal Priority Disciplines Outcome Goal Variances Interventions   Physical Therapy Goal     PT, PT/OT      Description: Short Term Goals  1. Patient will complete 30 reps of B LE exercises with correct form.   2. Patient will complete sit<>stand transfers with SBA.  3. Patient will ambulate 100 feet with RW on level surfaces with CGA.     Long Term Goals   1. Patient will ambulate 300 feet with RW on level and unlevel surfaces with SBA.  2. Patient will complete all functional transfers with MOD I.  3. Patient will negotiate up and down 2 stairs with use of handrail with SBA.                        Time Tracking:     PT Received On: 05/24/24  PT Start Time: 1338     PT Stop Time: 1410  PT Total Time (min): 32 min     Billable Minutes: Gait Training 11, Therapeutic Activity 0, and Therapeutic Exercise 21    Treatment Type: Treatment  PT/PTA: PTA     Number of PTA visits since last PT " visit: 3     Continue Plan of Care Per PT order to progress patient toward rehab goals as tolerated by patient.   ROSALVA Alcantara   05/24/2024

## 2024-05-24 NOTE — PLAN OF CARE
[facility] - Medical Surgical Unit - Swing Bed   Interdisciplinary Team Meeting    Patient: Milton Johnson   Today's Date: 5/24/2024   Estimated D/C Date:         Physician: Valentina Walker MD Unit Director: Rose Mary Em RN   Pharmacy: Julieth Richardson, LaliD Nursing: CHRISTA Jackson   : Johana Patel RN Physical/Occupational Therapy: Jud Kaur OT, PELON Waddell PT   Speech Therapy: ST Matt Respiratory: See respiratory notes   Dietary: See dietary notes   Other: MISBAH Doshi, RT     Nursing  New Symptoms/Problems: none at this time      Urine: continent  Jackson: No   Bowel: continent  Last Bowel Movement: (P) 05/23/24   Constipated: No  Diarrhea: No   Isolation: No  Cognition: WNL  Aspiration Precautions: No  Wound Care: Yes  Wound Location/Tx: right hip  Comment(s): n/a     Respiratory  O2 Device: Room Air  O2 Flow: n/a  SpO2: 97%  Neb Tx: No  Comment(s): n/a     Dietary  Nutrition: Diabetic  Comment(s): n/a    Speech Therapy  Speech/Swallowing: No current speech or swallowing issues  Comment(s): n/a    Physical Therapy  Gait/Assistive Device: Approx. 45' with RW and min assist x 2 on level indoor surface with verbal and visual cues for proper AD/LE sequencing.      ELOS: Plan to DC     Transfers: Contact Guard Assistance  Bed Mobility: Activity did not occur Range of Motion/Restrictions:  RLE weight bearing as tolerated   Comment(s): n/a      Occupational Therapy  Eating/Grooming: Supervision or Set-up Assistance Toileting: Minimal Assistance   Bathing: Minimal Assistance Dressing (Upper Body): Supervision or Set-up Assistance   Dressing (Lower Body): Minimal Assistance   Comment(s): n/a   Activity Therapy  Level of participation: Active participation  Comment(s): n/a    Pharmacy  Medication Changes: No  Labs Reviewed: Yes  New Lab Orders: No  Comment(s): labs q mon/thurs      Tx Plan/Recommendations reviewed with family and/or patient on 5/22/24.  Additional  family Conference/Training: not at this time  D/C Plan/Recommendations: Home with family  ARACELI:   Comment(s): pt will decide on outpt therapy vs hh closer to discharge

## 2024-05-24 NOTE — PROGRESS NOTES
[facility] - Medical Surgical [facility]  Hospital Medicine  Progress Note    Patient Name: Milton Johnson  MRN: [id number]  Patient Class: IP- Swing   Admission Date: 5/21/2024  Length of Stay: 3 days  Attending Physician: Valentina Walker,*  Primary Care Provider: Dontrell Shetty MD        Subjective:     Principal Problem:Muscular weakness        HPI:  This 78 yr. Old WM fell at home and fractured his right hip. He underwent an ORIF of said fracture. He is here for therapy after surgery. He has hypertension, diabetes, hyperlipidemia, and hyponatremia. He was found to have a UTI while there. Culture is pending.    Overview/Hospital Course:  5/22/24 - pt. Is doing well so far. Voices no complaint. Will continue present treatment.    5/24/24 - pt. Is doing well. Eating. Now BS's are elevating. Orders revised.    Interval History: BS's are elevating. Orders revised. Will continue PT.    Review of Systems  Objective:     Vital Signs (Most Recent):  Temp: 97.6 °F (36.4 °C) (05/24/24 0758)  Pulse: 79 (05/24/24 0758)  Resp: 18 (05/24/24 0758)  BP: (!) 144/72 (05/24/24 0758)  SpO2: 95 % (05/24/24 0758) Vital Signs (24h Range):  Temp:  [97.6 °F (36.4 °C)-97.9 °F (36.6 °C)] 97.6 °F (36.4 °C)  Pulse:  [] 79  Resp:  [18-20] 18  SpO2:  [95 %-96 %] 95 %  BP: (120-144)/(68-72) 144/72     Weight: 105 kg (231 lb 7.7 oz)  Body mass index is 30.54 kg/m².    Intake/Output Summary (Last 24 hours) at 5/24/2024 0813  Last data filed at 5/24/2024 0305  Gross per 24 hour   Intake 917 ml   Output 800 ml   Net 117 ml         Physical Exam        Significant Labs: All pertinent labs within the past 24 hours have been reviewed.    Significant Imaging: I have reviewed all pertinent imaging results/findings within the past 24 hours.    Assessment/Plan:      No notes have been filed under this hospital service.  Service: Hospital Medicine    VTE Risk Mitigation (From admission, onward)           Ordered     IP VTE LOW  RISK PATIENT  Once         05/21/24 1355     Place ESTEFANIA hose  Until discontinued         05/21/24 1355                    Discharge Planning   ARACELI:      Code Status: Full Code   Is the patient medically ready for discharge?:     Reason for patient still in hospital (select all that apply): Patient trending condition  Discharge Plan A: Home with family                  Valentina Walker MD  Department of Hospital Medicine   [facility] - Medical Surgical Unit

## 2024-05-24 NOTE — ADDENDUM NOTE
Encounter addended by: Hazel Cardozo on: 5/24/2024 1:13 PM   Actions taken: Charge Capture section accepted

## 2024-05-24 NOTE — PT/OT/SLP PROGRESS
Occupational Therapy   Treatment    Name: Milton Johnson  MRN: [id number]  Admitting Diagnosis:  Muscular weakness       Recommendations:     Discharge Recommendations:    Discharge Equipment Recommendations:  3-in-1 commode, walker, rolling  Barriers to discharge:       Assessment:     Milton Johnson is a 78 y.o. male with a medical diagnosis of Muscular weakness.   Performance deficits affecting function are weakness, impaired endurance, impaired self care skills, impaired functional mobility, impaired balance, decreased lower extremity function, decreased safety awareness.     Rehab Prognosis:  Good; patient would benefit from acute skilled OT services to address these deficits and reach maximum level of function.       Plan:     Patient to be seen 5 x/week to address the above listed problems via therapeutic exercises, therapeutic activities, self-care/home management  Plan of Care Expires:    Plan of Care Reviewed with: patient    Subjective     Chief Complaint: Decreased mobility  Patient/Family Comments/goals: Get stronger  Pain/Comfort:  Pain Rating 1: 0/10    Objective:     Communicated with: RN prior to session.  Patient found up in chair with   upon OT entry to room.    General Precautions: Standard, fall    Orthopedic Precautions:   Braces:    Respiratory Status: Room air     Occupational Performance:     Bed Mobility:    Patient was up in his chair already    Functional Mobility/Transfers:  Patient completed Sit <> Stand Transfer with minimum assistance  with  rolling walker   Functional Mobility: min a with RW    Activities of Daily Living:  Feeding S/u   Grooming S/u   Bathing Min a   UB dsg S/u   LB dsg Min a   Toileting Min a   Toilet t/f Min a           AMPAC 6 Click ADL: 22    Treatment & Education:  Pt educated on OT role/POC.   Importance of OOB activity with staff assistance.  Importance of sitting up in the chair throughout the day as tolerated, especially for meals   Safety during functional  t/f and mobility  Importance of assisting with self-care activities     Patient completed the following for increased strength to increase I with ADLs: UBE 8 min x 1x, 4# dowel- shoulder press, chest press, bicep curls x 40, red theraflex x 40 both ways, green theraband- scapular retraction x 40      Patient left up in chair with call button in reach and chair alarm on    GOALS:   Multidisciplinary Problems       Occupational Therapy Goals          Problem: Occupational Therapy    Goal Priority Disciplines Outcome Interventions   Occupational Therapy Goal     OT, PT/OT     Description: Description: Grooming Status:   Short Term Goal: Pt will perform grooming with s/u sitting EOB.   Long Term Goal: Pt will perform grooming/oral hygiene standing at sink with Mod I      LE dressing Status:   Short Term Goal: Pt will perform LE dressing with mod a.   Long Term Goal: Pt will perform LE dressing with min a.    Toileting Status:   Short Term Goal: Pt will perform toilet hygiene on BSC with min a.  Long Term Goal: Pt will perform toilet hygiene on toilet with no AE with s/u.    Commode Transfer:   Short Term Goal: Pt will perform BSC t/f with min a.  Long Term Goal:  Pt will perform toilet t/f in bathroom with s/u.     Bathing Status:   Long Term Goal: Pt will perform sponge bath with s/u with no unsafe fatigue.     Strength Status:   Long Term Goal: Pt to perform BUE strengthening with weights and/or body weight to increase ADL independence and safety    Endurance Status:   Short Term Goal:pt to perform 15 min OT treatment with 5 or greater rest breaks  Long Term Goal: pt to perform 30 min OT treat with 3 or less rest breaks                       Time Tracking:     OT Date of Treatment: 05/24/24  OT Start Time: 1233  OT Stop Time: 1303  OT Total Time (min): 30 min    Billable Minutes:Therapeutic Exercise 30 min  Jud Kaur OTR/L      5/24/2024

## 2024-05-24 NOTE — PLAN OF CARE
Problem: Diabetes Comorbidity  Goal: Blood Glucose Level Within Targeted Range  Outcome: Progressing     Problem: Pain Acute  Goal: Optimal Pain Control and Function  Outcome: Progressing     Problem: Fall Injury Risk  Goal: Absence of Fall and Fall-Related Injury  Outcome: Progressing     Problem: Infection  Goal: Absence of Infection Signs and Symptoms  Outcome: Progressing     Problem: Wound  Goal: Optimal Coping  Outcome: Progressing  Goal: Optimal Functional Ability  Outcome: Progressing  Goal: Absence of Infection Signs and Symptoms  Outcome: Progressing  Goal: Improved Oral Intake  Outcome: Progressing  Goal: Optimal Pain Control and Function  Outcome: Progressing  Goal: Skin Health and Integrity  Outcome: Progressing  Goal: Optimal Wound Healing  Outcome: Progressing     Problem: VTE (Venous Thromboembolism)  Goal: Tissue Perfusion  Outcome: Progressing

## 2024-05-25 LAB
GLUCOSE SERPL-MCNC: 140 MG/DL (ref 70–105)
GLUCOSE SERPL-MCNC: 147 MG/DL (ref 70–105)
GLUCOSE SERPL-MCNC: 167 MG/DL (ref 70–105)
GLUCOSE SERPL-MCNC: 228 MG/DL (ref 70–105)

## 2024-05-25 PROCEDURE — 25000003 PHARM REV CODE 250: Performed by: FAMILY MEDICINE

## 2024-05-25 PROCEDURE — 11000004 HC SNF PRIVATE

## 2024-05-25 PROCEDURE — 25000003 PHARM REV CODE 250: Performed by: EMERGENCY MEDICINE

## 2024-05-25 PROCEDURE — 94761 N-INVAS EAR/PLS OXIMETRY MLT: CPT

## 2024-05-25 PROCEDURE — 63600175 PHARM REV CODE 636 W HCPCS: Performed by: FAMILY MEDICINE

## 2024-05-25 PROCEDURE — 82962 GLUCOSE BLOOD TEST: CPT

## 2024-05-25 RX ADMIN — POTASSIUM CHLORIDE 20 MEQ: 1500 TABLET, EXTENDED RELEASE ORAL at 08:05

## 2024-05-25 RX ADMIN — LOSARTAN POTASSIUM 100 MG: 100 TABLET, FILM COATED ORAL at 09:05

## 2024-05-25 RX ADMIN — PIOGLITAZONE 15 MG: 15 TABLET ORAL at 09:05

## 2024-05-25 RX ADMIN — SENNOSIDES AND DOCUSATE SODIUM 1 TABLET: 8.6; 5 TABLET ORAL at 09:05

## 2024-05-25 RX ADMIN — ASPIRIN 81 MG: 81 TABLET, COATED ORAL at 08:05

## 2024-05-25 RX ADMIN — CHOLECALCIFEROL TAB 125 MCG (5000 UNIT) 5000 UNITS: 125 TAB at 09:05

## 2024-05-25 RX ADMIN — CARVEDILOL 3.12 MG: 3.12 TABLET, FILM COATED ORAL at 05:05

## 2024-05-25 RX ADMIN — POTASSIUM CHLORIDE 20 MEQ: 1500 TABLET, EXTENDED RELEASE ORAL at 09:05

## 2024-05-25 RX ADMIN — SULFAMETHOXAZOLE AND TRIMETHOPRIM 1 TABLET: 800; 160 TABLET ORAL at 09:05

## 2024-05-25 RX ADMIN — POLYETHYLENE GLYCOL 3350 17 G: 17 POWDER, FOR SOLUTION ORAL at 09:05

## 2024-05-25 RX ADMIN — AMLODIPINE BESYLATE 10 MG: 10 TABLET ORAL at 09:05

## 2024-05-25 RX ADMIN — BIMATOPROST 1 DROP: 0.1 SOLUTION/ DROPS OPHTHALMIC at 08:05

## 2024-05-25 RX ADMIN — METFORMIN HYDROCHLORIDE 500 MG: 500 TABLET, FILM COATED ORAL at 09:05

## 2024-05-25 RX ADMIN — INSULIN DETEMIR 30 UNITS: 100 INJECTION, SOLUTION SUBCUTANEOUS at 08:05

## 2024-05-25 RX ADMIN — DORZOLAMIDE HCL 1 DROP: 20 SOLUTION/ DROPS OPHTHALMIC at 09:05

## 2024-05-25 RX ADMIN — SULFAMETHOXAZOLE AND TRIMETHOPRIM 1 TABLET: 800; 160 TABLET ORAL at 08:05

## 2024-05-25 RX ADMIN — ASPIRIN 81 MG: 81 TABLET, COATED ORAL at 09:05

## 2024-05-25 RX ADMIN — HYDROCHLOROTHIAZIDE 25 MG: 25 TABLET ORAL at 09:05

## 2024-05-25 RX ADMIN — CARVEDILOL 3.12 MG: 3.12 TABLET, FILM COATED ORAL at 09:05

## 2024-05-25 RX ADMIN — SENNOSIDES AND DOCUSATE SODIUM 1 TABLET: 8.6; 5 TABLET ORAL at 08:05

## 2024-05-25 RX ADMIN — METFORMIN HYDROCHLORIDE 500 MG: 500 TABLET, FILM COATED ORAL at 05:05

## 2024-05-25 RX ADMIN — DORZOLAMIDE HCL 1 DROP: 20 SOLUTION/ DROPS OPHTHALMIC at 08:05

## 2024-05-25 RX ADMIN — ATORVASTATIN CALCIUM 80 MG: 40 TABLET, FILM COATED ORAL at 08:05

## 2024-05-25 NOTE — PLAN OF CARE
Problem: Adult Inpatient Plan of Care  Goal: Plan of Care Review  Outcome: Progressing  Flowsheets (Taken 5/24/2024 1940)  Plan of Care Reviewed With: patient  Goal: Patient-Specific Goal (Individualized)  Outcome: Progressing  Goal: Absence of Hospital-Acquired Illness or Injury  Outcome: Progressing  Intervention: Prevent Infection  Flowsheets (Taken 5/24/2024 1940)  Infection Prevention:   environmental surveillance performed   equipment surfaces disinfected   hand hygiene promoted   personal protective equipment utilized   rest/sleep promoted   single patient room provided  Goal: Optimal Comfort and Wellbeing  Outcome: Progressing  Intervention: Provide Person-Centered Care  Flowsheets (Taken 5/24/2024 1940)  Trust Relationship/Rapport:   choices provided   emotional support provided   empathic listening provided   questions answered   questions encouraged   reassurance provided   thoughts/feelings acknowledged  Goal: Readiness for Transition of Care  Outcome: Progressing  Intervention: Mutually Develop Transition Plan  Flowsheets (Taken 5/24/2024 1940)  Equipment Currently Used at Home: none  Transportation Anticipated: family or friend will provide  Communicated ARACELI with patient/caregiver: Date not available/Unable to determine  Do you expect to return to your current living situation?: Yes  Do you have help at home or someone to help you manage your care at home?: Yes  Readmission within 30 days?: No  Do you currently have service(s) that help you manage your care at home?: No

## 2024-05-26 LAB
GLUCOSE SERPL-MCNC: 120 MG/DL (ref 70–105)
GLUCOSE SERPL-MCNC: 124 MG/DL (ref 70–105)
GLUCOSE SERPL-MCNC: 173 MG/DL (ref 70–105)
GLUCOSE SERPL-MCNC: 214 MG/DL (ref 70–105)

## 2024-05-26 PROCEDURE — 63600175 PHARM REV CODE 636 W HCPCS: Performed by: FAMILY MEDICINE

## 2024-05-26 PROCEDURE — 11000004 HC SNF PRIVATE

## 2024-05-26 PROCEDURE — 25000003 PHARM REV CODE 250: Performed by: FAMILY MEDICINE

## 2024-05-26 PROCEDURE — 82962 GLUCOSE BLOOD TEST: CPT

## 2024-05-26 PROCEDURE — 25000003 PHARM REV CODE 250: Performed by: EMERGENCY MEDICINE

## 2024-05-26 PROCEDURE — 94761 N-INVAS EAR/PLS OXIMETRY MLT: CPT

## 2024-05-26 RX ADMIN — SENNOSIDES AND DOCUSATE SODIUM 1 TABLET: 8.6; 5 TABLET ORAL at 08:05

## 2024-05-26 RX ADMIN — POTASSIUM CHLORIDE 20 MEQ: 1500 TABLET, EXTENDED RELEASE ORAL at 08:05

## 2024-05-26 RX ADMIN — DORZOLAMIDE HCL 1 DROP: 20 SOLUTION/ DROPS OPHTHALMIC at 08:05

## 2024-05-26 RX ADMIN — HYDROCHLOROTHIAZIDE 25 MG: 25 TABLET ORAL at 08:05

## 2024-05-26 RX ADMIN — ATORVASTATIN CALCIUM 80 MG: 40 TABLET, FILM COATED ORAL at 08:05

## 2024-05-26 RX ADMIN — SULFAMETHOXAZOLE AND TRIMETHOPRIM 1 TABLET: 800; 160 TABLET ORAL at 08:05

## 2024-05-26 RX ADMIN — AMLODIPINE BESYLATE 10 MG: 10 TABLET ORAL at 08:05

## 2024-05-26 RX ADMIN — CARVEDILOL 3.12 MG: 3.12 TABLET, FILM COATED ORAL at 05:05

## 2024-05-26 RX ADMIN — POLYETHYLENE GLYCOL 3350 17 G: 17 POWDER, FOR SOLUTION ORAL at 08:05

## 2024-05-26 RX ADMIN — METFORMIN HYDROCHLORIDE 500 MG: 500 TABLET, FILM COATED ORAL at 08:05

## 2024-05-26 RX ADMIN — CARVEDILOL 3.12 MG: 3.12 TABLET, FILM COATED ORAL at 08:05

## 2024-05-26 RX ADMIN — METFORMIN HYDROCHLORIDE 500 MG: 500 TABLET, FILM COATED ORAL at 05:05

## 2024-05-26 RX ADMIN — CHOLECALCIFEROL TAB 125 MCG (5000 UNIT) 5000 UNITS: 125 TAB at 08:05

## 2024-05-26 RX ADMIN — PIOGLITAZONE 15 MG: 15 TABLET ORAL at 08:05

## 2024-05-26 RX ADMIN — INSULIN DETEMIR 30 UNITS: 100 INJECTION, SOLUTION SUBCUTANEOUS at 08:05

## 2024-05-26 RX ADMIN — ASPIRIN 81 MG: 81 TABLET, COATED ORAL at 08:05

## 2024-05-26 RX ADMIN — LOSARTAN POTASSIUM 100 MG: 100 TABLET, FILM COATED ORAL at 08:05

## 2024-05-26 RX ADMIN — BIMATOPROST 1 DROP: 0.1 SOLUTION/ DROPS OPHTHALMIC at 08:05

## 2024-05-26 NOTE — PLAN OF CARE
Problem: Adult Inpatient Plan of Care  Goal: Plan of Care Review  Outcome: Progressing  Flowsheets (Taken 5/25/2024 1926)  Plan of Care Reviewed With: patient  Goal: Patient-Specific Goal (Individualized)  Outcome: Progressing  Goal: Absence of Hospital-Acquired Illness or Injury  Outcome: Progressing  Intervention: Prevent Infection  Flowsheets (Taken 5/25/2024 1926)  Infection Prevention:   environmental surveillance performed   equipment surfaces disinfected   hand hygiene promoted   personal protective equipment utilized   rest/sleep promoted   single patient room provided  Goal: Optimal Comfort and Wellbeing  Outcome: Progressing  Intervention: Provide Person-Centered Care  Flowsheets (Taken 5/25/2024 1926)  Trust Relationship/Rapport:   care explained   emotional support provided   questions answered   reassurance provided   thoughts/feelings acknowledged   questions encouraged   empathic listening provided   choices provided  Goal: Readiness for Transition of Care  Outcome: Progressing  Intervention: Mutually Develop Transition Plan  Flowsheets (Taken 5/25/2024 1926)  Equipment Currently Used at Home: none  Transportation Anticipated: family or friend will provide  Communicated ARACELI with patient/caregiver: Date not available/Unable to determine  Do you expect to return to your current living situation?: Yes  Do you have help at home or someone to help you manage your care at home?: Yes  Readmission within 30 days?: No  Do you currently have service(s) that help you manage your care at home?: No

## 2024-05-27 LAB
ANION GAP SERPL CALCULATED.3IONS-SCNC: 12 MMOL/L (ref 7–16)
BASOPHILS # BLD AUTO: 0.11 K/UL (ref 0–0.2)
BASOPHILS NFR BLD AUTO: 0.9 % (ref 0–1)
BUN SERPL-MCNC: 26 MG/DL (ref 7–18)
BUN/CREAT SERPL: 17 (ref 6–20)
CALCIUM SERPL-MCNC: 9 MG/DL (ref 8.5–10.1)
CHLORIDE SERPL-SCNC: 93 MMOL/L (ref 98–107)
CO2 SERPL-SCNC: 25 MMOL/L (ref 21–32)
CREAT SERPL-MCNC: 1.54 MG/DL (ref 0.7–1.3)
DIFFERENTIAL METHOD BLD: ABNORMAL
EGFR (NO RACE VARIABLE) (RUSH/TITUS): 46 ML/MIN/1.73M2
EOSINOPHIL # BLD AUTO: 0.24 K/UL (ref 0–0.5)
EOSINOPHIL NFR BLD AUTO: 1.9 % (ref 1–4)
ERYTHROCYTE [DISTWIDTH] IN BLOOD BY AUTOMATED COUNT: 13.3 % (ref 11.5–14.5)
GLUCOSE SERPL-MCNC: 120 MG/DL (ref 70–105)
GLUCOSE SERPL-MCNC: 124 MG/DL (ref 74–106)
GLUCOSE SERPL-MCNC: 162 MG/DL (ref 70–105)
GLUCOSE SERPL-MCNC: 184 MG/DL (ref 70–105)
HCT VFR BLD AUTO: 33.4 % (ref 40–54)
HGB BLD-MCNC: 11.5 G/DL (ref 13.5–18)
IMM GRANULOCYTES # BLD AUTO: 0.12 K/UL (ref 0–0.04)
IMM GRANULOCYTES NFR BLD: 0.9 % (ref 0–0.4)
LYMPHOCYTES # BLD AUTO: 1.84 K/UL (ref 1–4.8)
LYMPHOCYTES NFR BLD AUTO: 14.4 % (ref 27–41)
MCH RBC QN AUTO: 29.5 PG (ref 27–31)
MCHC RBC AUTO-ENTMCNC: 34.4 G/DL (ref 32–36)
MCV RBC AUTO: 85.6 FL (ref 80–96)
MONOCYTES # BLD AUTO: 0.91 K/UL (ref 0–0.8)
MONOCYTES NFR BLD AUTO: 7.1 % (ref 2–6)
MPC BLD CALC-MCNC: 8.7 FL (ref 9.4–12.4)
NEUTROPHILS # BLD AUTO: 9.53 K/UL (ref 1.8–7.7)
NEUTROPHILS NFR BLD AUTO: 74.8 % (ref 53–65)
NRBC # BLD AUTO: 0 X10E3/UL
NRBC, AUTO (.00): 0 %
PLATELET # BLD AUTO: 363 K/UL (ref 150–400)
POTASSIUM SERPL-SCNC: 4.1 MMOL/L (ref 3.5–5.1)
RBC # BLD AUTO: 3.9 M/UL (ref 4.6–6.2)
SODIUM SERPL-SCNC: 126 MMOL/L (ref 136–145)
WBC # BLD AUTO: 12.75 K/UL (ref 4.5–11)

## 2024-05-27 PROCEDURE — 85025 COMPLETE CBC W/AUTO DIFF WBC: CPT | Performed by: FAMILY MEDICINE

## 2024-05-27 PROCEDURE — 11000004 HC SNF PRIVATE

## 2024-05-27 PROCEDURE — 97110 THERAPEUTIC EXERCISES: CPT | Mod: CQ

## 2024-05-27 PROCEDURE — 25000003 PHARM REV CODE 250: Performed by: FAMILY MEDICINE

## 2024-05-27 PROCEDURE — 80048 BASIC METABOLIC PNL TOTAL CA: CPT | Performed by: FAMILY MEDICINE

## 2024-05-27 PROCEDURE — 36415 COLL VENOUS BLD VENIPUNCTURE: CPT | Performed by: FAMILY MEDICINE

## 2024-05-27 PROCEDURE — 63600175 PHARM REV CODE 636 W HCPCS: Performed by: FAMILY MEDICINE

## 2024-05-27 PROCEDURE — 25000003 PHARM REV CODE 250: Performed by: EMERGENCY MEDICINE

## 2024-05-27 PROCEDURE — 94761 N-INVAS EAR/PLS OXIMETRY MLT: CPT

## 2024-05-27 PROCEDURE — 82962 GLUCOSE BLOOD TEST: CPT

## 2024-05-27 PROCEDURE — 97116 GAIT TRAINING THERAPY: CPT | Mod: CQ

## 2024-05-27 RX ADMIN — POTASSIUM CHLORIDE 20 MEQ: 1500 TABLET, EXTENDED RELEASE ORAL at 09:05

## 2024-05-27 RX ADMIN — POTASSIUM CHLORIDE 20 MEQ: 1500 TABLET, EXTENDED RELEASE ORAL at 08:05

## 2024-05-27 RX ADMIN — LOSARTAN POTASSIUM 100 MG: 100 TABLET, FILM COATED ORAL at 09:05

## 2024-05-27 RX ADMIN — PIOGLITAZONE 15 MG: 15 TABLET ORAL at 09:05

## 2024-05-27 RX ADMIN — POLYETHYLENE GLYCOL 3350 17 G: 17 POWDER, FOR SOLUTION ORAL at 09:05

## 2024-05-27 RX ADMIN — DORZOLAMIDE HCL 1 DROP: 20 SOLUTION/ DROPS OPHTHALMIC at 09:05

## 2024-05-27 RX ADMIN — INSULIN DETEMIR 30 UNITS: 100 INJECTION, SOLUTION SUBCUTANEOUS at 08:05

## 2024-05-27 RX ADMIN — SULFAMETHOXAZOLE AND TRIMETHOPRIM 1 TABLET: 800; 160 TABLET ORAL at 09:05

## 2024-05-27 RX ADMIN — HYDROCHLOROTHIAZIDE 25 MG: 25 TABLET ORAL at 09:05

## 2024-05-27 RX ADMIN — SULFAMETHOXAZOLE AND TRIMETHOPRIM 1 TABLET: 800; 160 TABLET ORAL at 08:05

## 2024-05-27 RX ADMIN — CHOLECALCIFEROL TAB 125 MCG (5000 UNIT) 5000 UNITS: 125 TAB at 09:05

## 2024-05-27 RX ADMIN — AMLODIPINE BESYLATE 10 MG: 10 TABLET ORAL at 09:05

## 2024-05-27 RX ADMIN — ASPIRIN 81 MG: 81 TABLET, COATED ORAL at 08:05

## 2024-05-27 RX ADMIN — BIMATOPROST 1 DROP: 0.1 SOLUTION/ DROPS OPHTHALMIC at 08:05

## 2024-05-27 RX ADMIN — ASPIRIN 81 MG: 81 TABLET, COATED ORAL at 09:05

## 2024-05-27 RX ADMIN — SENNOSIDES AND DOCUSATE SODIUM 1 TABLET: 8.6; 5 TABLET ORAL at 08:05

## 2024-05-27 RX ADMIN — CARVEDILOL 3.12 MG: 3.12 TABLET, FILM COATED ORAL at 04:05

## 2024-05-27 RX ADMIN — ATORVASTATIN CALCIUM 80 MG: 40 TABLET, FILM COATED ORAL at 08:05

## 2024-05-27 RX ADMIN — METFORMIN HYDROCHLORIDE 500 MG: 500 TABLET, FILM COATED ORAL at 04:05

## 2024-05-27 RX ADMIN — OXYCODONE HYDROCHLORIDE AND ACETAMINOPHEN 1 TABLET: 10; 325 TABLET ORAL at 01:05

## 2024-05-27 RX ADMIN — METFORMIN HYDROCHLORIDE 500 MG: 500 TABLET, FILM COATED ORAL at 09:05

## 2024-05-27 RX ADMIN — CARVEDILOL 3.12 MG: 3.12 TABLET, FILM COATED ORAL at 09:05

## 2024-05-27 RX ADMIN — DORZOLAMIDE HCL 1 DROP: 20 SOLUTION/ DROPS OPHTHALMIC at 08:05

## 2024-05-27 RX ADMIN — SENNOSIDES AND DOCUSATE SODIUM 1 TABLET: 8.6; 5 TABLET ORAL at 09:05

## 2024-05-27 NOTE — PT/OT/SLP PROGRESS
Physical Therapy Treatment    Patient Name:  Milton Johnson   MRN:  [id number]    Recommendations:     Discharge Recommendations: Low Intensity Therapy  Discharge Equipment Recommendations: 3-in-1 commode, walker, rolling  Barriers to discharge: None    Assessment:     Milton Johnson is a 78 y.o. male admitted with a medical diagnosis of Muscular weakness.  He presents with the following impairments/functional limitations: weakness, impaired endurance, impaired self care skills, impaired functional mobility, gait instability, impaired balance, decreased upper extremity function, decreased lower extremity function, pain, orthopedic precautions .  Patient requires mod verbal and visual cues to complete supine straight leg raises' with attention to preventing Quad lag.  Patient transfers sit to stand to sit and to and from treatment table with improved ease of mobility.  Patient continues to demonstrate decrease stance time on Right LE during gait training.  No adverse effects noted to PT treatment session.     Rehab Prognosis: Good; patient would benefit from acute skilled PT services to address these deficits and reach maximum level of function.    Recent Surgery: * No surgery found *      Plan:     During this hospitalization, patient to be seen 5 x/week to address the identified rehab impairments via gait training, therapeutic activities, therapeutic exercises and progress toward the following goals:    Plan of Care Expires:  06/11/24    Subjective     Chief Complaint: Decreased mobility   Patient/Family Comments/goals: Get stronger   Pain/Comfort:  Pain Rating 1: 4/10  Location - Side 1: Right  Location - Orientation 1: generalized  Location 1: hip  Pain Addressed 1: Cessation of Activity      Objective:     Communicated with skilled nursing prior to session.  Patient found up in chair in his room with his wife present.     General Precautions: Standard, fall  Orthopedic Precautions: RLE weight bearing as  "tolerated  Braces: N/A  Respiratory Status: Room air     Functional Mobility:  Transfers:     Sit to Stand:  contact guard assistance with rolling walker  Gait: Approx. 120' x 2  with RW and CGA  x 1 on level indoor surface with verbal and visual cues for proper AD/LE sequencing.        AM-PAC 6 CLICK MOBILITY  Turning over in bed (including adjusting bedclothes, sheets and blankets)?: 3  Sitting down on and standing up from a chair with arms (e.g., wheelchair, bedside commode, etc.): 3  Moving from lying on back to sitting on the side of the bed?: 3  Moving to and from a bed to a chair (including a wheelchair)?: 3  Need to walk in hospital room?: 3  Climbing 3-5 steps with a railing?: 1 (Not attempted)  Basic Mobility Total Score: 16       Treatment & Education:  Ther-Ex Reps       Ankle pumps    Quad sets 30 x 3"   Horizontal Hip Abduction/Hip ADD 3 x 10, Right LE    Hip ADD 3 x 10 *   Heelslides 2 x 10   LAQ's 2 x 10   Hamstring curls 2 x 10 green *    Seated hip rotation    Hip ABD 2 x 10 yellow *    Quad sets  30 x 3" *    Supine straight leg raises'  10 x active assisted *             Patient left up in chair with call button in reach..    GOALS:   Multidisciplinary Problems       Physical Therapy Goals          Problem: Physical Therapy    Goal Priority Disciplines Outcome Goal Variances Interventions   Physical Therapy Goal     PT, PT/OT      Description: Short Term Goals  1. Patient will complete 30 reps of B LE exercises with correct form.   2. Patient will complete sit<>stand transfers with SBA.  3. Patient will ambulate 100 feet with RW on level surfaces with CGA.     Long Term Goals   1. Patient will ambulate 300 feet with RW on level and unlevel surfaces with SBA.  2. Patient will complete all functional transfers with MOD I.  3. Patient will negotiate up and down 2 stairs with use of handrail with SBA.                        Time Tracking:     PT Received On: 05/27/24  PT Start Time: 0951     PT Stop " Time: 1025  PT Total Time (min): 34 min     Billable Minutes: Gait Training 10, Therapeutic Activity 3, and Therapeutic Exercise 21    Treatment Type: Treatment  PT/PTA: PTA     Number of PTA visits since last PT visit: 4     Continue Plan of Care Per PT order to progress patient toward rehab goals as tolerated by patient.   ROSALVA Alcantara   05/27/2024

## 2024-05-27 NOTE — PLAN OF CARE
Problem: Adult Inpatient Plan of Care  Goal: Plan of Care Review  Outcome: Progressing  Goal: Patient-Specific Goal (Individualized)  Outcome: Progressing     Problem: Fall Injury Risk  Goal: Absence of Fall and Fall-Related Injury  Outcome: Progressing  Intervention: Identify and Manage Contributors  Flowsheets (Taken 5/27/2024 1726)  Self-Care Promotion:   independence encouraged   BADL personal objects within reach   BADL personal routines maintained  Medication Review/Management:   medications reviewed   high-risk medications identified  Intervention: Promote Injury-Free Environment  Flowsheets (Taken 5/27/2024 1726)  Safety Promotion/Fall Prevention:   assistive device/personal item within reach   diversional activities provided   Fall Risk reviewed with patient/family   Fall Risk signage in place   high risk medications identified   in recliner, wheels locked   medications reviewed   muscle strengthening facilitated   nonskid shoes/socks when out of bed   side rails raised x 3   instructed to call staff for mobility

## 2024-05-27 NOTE — NURSING
Patient Rounds   by Yoly Alford, Patient Care Assistant at 5/27/24 1834   Patient Rounds  Assisted patient back into bed per request. NADN. Filled ice pitcher with water per request. No other needs/complaints voiced. No family at bedside.   by Leia Hamilton, RN at 5/27/24 1810   Patient Rounds  Supper tray delivered/set up. No family at bedside.   by Leia Hamilton RN at 5/27/24 1705   Patient Rounds  Accu check is 120   by Leia Hamilton RN at 5/27/24 1639   Patient Rounds   by Yoly Alford, Patient Care Assistant at 5/27/24 1613   Patient Rounds  Sitting up in bed looking at tablet. NADN. No needs/complains voiced. No family at bedside.   by Leia Hamilton RN at 5/27/24 1500   Patient Rounds   by Yoly Alford, Patient Care Assistant at 5/27/24 1433   Patient Rounds  Lying in bed looking at [employer] on tablet. NADN. No needs/complaints voiced. No family at bedside.   by Leia Hamilton RN at 5/27/24 1351   Patient Rounds  Assisted patient up to bathroom, voided, back to bed. No other needs/complaints voiced. No family at bedside.   by Leia Hamilton RN at 5/27/24 1220   Patient Rounds   by Yoly Alford, Patient Care Assistant at 5/27/24 1206   Patient Rounds  Lunch tray delivered/set up. Family at bedside.   by Leia Hamilton RN at 5/27/24 1105   Patient Rounds  Accu check is 184, no SSI ordered   by Leia Hamilton RN at 5/27/24 1052   Patient Rounds   by Yoly Alford, Patient Care Assistant at 5/27/24 1032   Patient Rounds  Patient out of the room to therapy   by Leia Hamilton, RN at 5/27/24 1000   Patient Rounds  In chair awake. NADN. Routine meds gvien. Spouse at bedside. No needs/complaints voiced.   by Leia Hamilton RN at 5/27/24 0920   Patient Rounds   by Yoly Alford, Patient Care Assistant at 5/27/24 0811   Patient Rounds  In chair watching tablet. NADN. Reports minor pain, did not rate. Dressings noted c/d/i to R hip. No needs/complaints voiced. No family at bedside.   by  Leia Hamilton, RN at 5/27/24 0750   Patient Rounds  In chair awake. NADN. Breakfast tray delivered/set up.   by Leia Hamilton, RN at 5/27/24 5668

## 2024-05-28 LAB
GLUCOSE SERPL-MCNC: 123 MG/DL (ref 70–105)
GLUCOSE SERPL-MCNC: 141 MG/DL (ref 70–105)
GLUCOSE SERPL-MCNC: 152 MG/DL (ref 70–105)
GLUCOSE SERPL-MCNC: 89 MG/DL (ref 70–105)

## 2024-05-28 PROCEDURE — 63600175 PHARM REV CODE 636 W HCPCS: Performed by: FAMILY MEDICINE

## 2024-05-28 PROCEDURE — 25000003 PHARM REV CODE 250: Performed by: FAMILY MEDICINE

## 2024-05-28 PROCEDURE — 82962 GLUCOSE BLOOD TEST: CPT

## 2024-05-28 PROCEDURE — 99308 SBSQ NF CARE LOW MDM 20: CPT | Mod: ,,, | Performed by: FAMILY MEDICINE

## 2024-05-28 PROCEDURE — 94761 N-INVAS EAR/PLS OXIMETRY MLT: CPT

## 2024-05-28 PROCEDURE — 97530 THERAPEUTIC ACTIVITIES: CPT | Mod: CQ

## 2024-05-28 PROCEDURE — 11000004 HC SNF PRIVATE

## 2024-05-28 PROCEDURE — 27000877 HC GARMENT COMPRESSION, FOOT TO THIGH

## 2024-05-28 PROCEDURE — 25000003 PHARM REV CODE 250: Performed by: EMERGENCY MEDICINE

## 2024-05-28 PROCEDURE — 97116 GAIT TRAINING THERAPY: CPT | Mod: CQ

## 2024-05-28 PROCEDURE — 97110 THERAPEUTIC EXERCISES: CPT

## 2024-05-28 RX ADMIN — OXYCODONE HYDROCHLORIDE AND ACETAMINOPHEN 1 TABLET: 10; 325 TABLET ORAL at 12:05

## 2024-05-28 RX ADMIN — SULFAMETHOXAZOLE AND TRIMETHOPRIM 1 TABLET: 800; 160 TABLET ORAL at 08:05

## 2024-05-28 RX ADMIN — HYDROCHLOROTHIAZIDE 25 MG: 25 TABLET ORAL at 09:05

## 2024-05-28 RX ADMIN — SENNOSIDES AND DOCUSATE SODIUM 1 TABLET: 8.6; 5 TABLET ORAL at 08:05

## 2024-05-28 RX ADMIN — INSULIN DETEMIR 30 UNITS: 100 INJECTION, SOLUTION SUBCUTANEOUS at 08:05

## 2024-05-28 RX ADMIN — POTASSIUM CHLORIDE 20 MEQ: 1500 TABLET, EXTENDED RELEASE ORAL at 08:05

## 2024-05-28 RX ADMIN — SENNOSIDES AND DOCUSATE SODIUM 1 TABLET: 8.6; 5 TABLET ORAL at 09:05

## 2024-05-28 RX ADMIN — CARVEDILOL 3.12 MG: 3.12 TABLET, FILM COATED ORAL at 08:05

## 2024-05-28 RX ADMIN — AMLODIPINE BESYLATE 10 MG: 10 TABLET ORAL at 09:05

## 2024-05-28 RX ADMIN — DORZOLAMIDE HCL 1 DROP: 20 SOLUTION/ DROPS OPHTHALMIC at 09:05

## 2024-05-28 RX ADMIN — POLYETHYLENE GLYCOL 3350 17 G: 17 POWDER, FOR SOLUTION ORAL at 09:05

## 2024-05-28 RX ADMIN — LOSARTAN POTASSIUM 100 MG: 100 TABLET, FILM COATED ORAL at 09:05

## 2024-05-28 RX ADMIN — ATORVASTATIN CALCIUM 80 MG: 40 TABLET, FILM COATED ORAL at 08:05

## 2024-05-28 RX ADMIN — DORZOLAMIDE HCL 1 DROP: 20 SOLUTION/ DROPS OPHTHALMIC at 08:05

## 2024-05-28 RX ADMIN — PIOGLITAZONE 15 MG: 15 TABLET ORAL at 09:05

## 2024-05-28 RX ADMIN — CARVEDILOL 3.12 MG: 3.12 TABLET, FILM COATED ORAL at 05:05

## 2024-05-28 RX ADMIN — BIMATOPROST 1 DROP: 0.1 SOLUTION/ DROPS OPHTHALMIC at 08:05

## 2024-05-28 RX ADMIN — ASPIRIN 81 MG: 81 TABLET, COATED ORAL at 08:05

## 2024-05-28 RX ADMIN — POTASSIUM CHLORIDE 20 MEQ: 1500 TABLET, EXTENDED RELEASE ORAL at 09:05

## 2024-05-28 RX ADMIN — SULFAMETHOXAZOLE AND TRIMETHOPRIM 1 TABLET: 800; 160 TABLET ORAL at 09:05

## 2024-05-28 RX ADMIN — ASPIRIN 81 MG: 81 TABLET, COATED ORAL at 09:05

## 2024-05-28 RX ADMIN — METFORMIN HYDROCHLORIDE 500 MG: 500 TABLET, FILM COATED ORAL at 05:05

## 2024-05-28 RX ADMIN — CHOLECALCIFEROL TAB 125 MCG (5000 UNIT) 5000 UNITS: 125 TAB at 09:05

## 2024-05-28 RX ADMIN — METFORMIN HYDROCHLORIDE 500 MG: 500 TABLET, FILM COATED ORAL at 08:05

## 2024-05-28 NOTE — SUBJECTIVE & OBJECTIVE
Interval History: doing well. Will continue present treatment.    Review of Systems  Objective:     Vital Signs (Most Recent):  Temp: 97.4 °F (36.3 °C) (05/28/24 0744)  Pulse: 73 (05/28/24 0744)  Resp: 18 (05/28/24 0744)  BP: 122/67 (05/28/24 0744)  SpO2: 97 % (05/28/24 0744) Vital Signs (24h Range):  Temp:  [97.4 °F (36.3 °C)-97.9 °F (36.6 °C)] 97.4 °F (36.3 °C)  Pulse:  [73-95] 73  Resp:  [18-20] 18  SpO2:  [94 %-97 %] 97 %  BP: ()/(67) 122/67     Weight: 101.5 kg (223 lb 12.3 oz)  Body mass index is 29.52 kg/m².    Intake/Output Summary (Last 24 hours) at 5/28/2024 0812  Last data filed at 5/28/2024 0808  Gross per 24 hour   Intake 480 ml   Output 400 ml   Net 80 ml         Physical Exam        Significant Labs: All pertinent labs within the past 24 hours have been reviewed.    Significant Imaging: I have reviewed all pertinent imaging results/findings within the past 24 hours.

## 2024-05-28 NOTE — PT/OT/SLP PROGRESS
Physical Therapy Treatment    Patient Name:  Milton Johnson   MRN:  [id number]    Recommendations:     Discharge Recommendations: Low Intensity Therapy  Discharge Equipment Recommendations: 3-in-1 commode, walker, rolling  Barriers to discharge: None    Assessment:     Milton Johnson is a 78 y.o. male admitted with a medical diagnosis of Muscular weakness.  He presents with the following impairments/functional limitations: weakness, impaired endurance, impaired self care skills, impaired functional mobility, gait instability, decreased upper extremity function, decreased lower extremity function, impaired balance, orthopedic precautions .  Patient requires mod verbal and visual cues to complete standing Therapeutic activities with proper alignment, speed of movement, posture, and count.  Patient requires cues for proper hand positioning and for upright posture during sit to stand to sit transfers.  Patient reports mod muscle fatigue and requires frequent rest breaks throughout physical therapy treatment session.          Rehab Prognosis: Good; patient would benefit from acute skilled PT services to address these deficits and reach maximum level of function.    Recent Surgery: * No surgery found *      Plan:     During this hospitalization, patient to be seen 5 x/week to address the identified rehab impairments via gait training, therapeutic activities, therapeutic exercises and progress toward the following goals:    Plan of Care Expires:  06/11/24    Subjective     Chief Complaint: Decreased mobility   Patient/Family Comments/goals: Get stronger   Pain/Comfort:  Pain Rating 1: 0/10      Objective:     Communicated with skilled nursing prior to session.  Patient found alert with HOB elevated in his room.     General Precautions: Standard, fall  Orthopedic Precautions: RLE weight bearing as tolerated  Braces: N/A  Respiratory Status: Room air     Functional Mobility:  Transfers:     Sit to Stand:  contact guard  assistance with rolling walker  Gait: Approx. 120' x 2  with RW and CGA  x 1 on level indoor surface with verbal and visual cues for proper AD/LE sequencing.        AM-PAC 6 CLICK MOBILITY  Turning over in bed (including adjusting bedclothes, sheets and blankets)?: 3  Sitting down on and standing up from a chair with arms (e.g., wheelchair, bedside commode, etc.): 3  Moving from lying on back to sitting on the side of the bed?: 3  Moving to and from a bed to a chair (including a wheelchair)?: 3  Need to walk in hospital room?: 3  Climbing 3-5 steps with a railing?: 1  Basic Mobility Total Score: 16       Therapeutic activities to improve functional mobility.  See below:     2 x 10 each,  R LE only, standing:  hip and knee flexion, hip abduction, hip extension     2 x 10 each, bilateral LE's, standing:  heel raises, mini squats    Sit to stand repetitions 3 x 5 to and from AD     Patient left in bed with HOB elevated and call button within reach.     GOALS:   Multidisciplinary Problems       Physical Therapy Goals          Problem: Physical Therapy    Goal Priority Disciplines Outcome Goal Variances Interventions   Physical Therapy Goal     PT, PT/OT      Description: Short Term Goals  1. Patient will complete 30 reps of B LE exercises with correct form.   2. Patient will complete sit<>stand transfers with SBA.  3. Patient will ambulate 100 feet with RW on level surfaces with CGA.     Long Term Goals   1. Patient will ambulate 300 feet with RW on level and unlevel surfaces with SBA.  2. Patient will complete all functional transfers with MOD I.  3. Patient will negotiate up and down 2 stairs with use of handrail with SBA.                        Time Tracking:     PT Received On: 05/28/24  PT Start Time: 1335     PT Stop Time: 1406  PT Total Time (min): 31 min     Billable Minutes: Gait 10, Therapeutic activities 20       Treatment Type: Treatment  PT/PTA: PTA     Number of PTA visits since last PT visit: 5      Continue Plan of Care Per PT order to progress patient toward rehab goals as tolerated by patient.   ROSALVA Alcantara   05/28/2024

## 2024-05-28 NOTE — NURSING
Pt sitting up in bed watching tv, with glasses on, denies pain and discomfort. Aox4, no acute resp distress noted. Water,walker, and urinal within reach. Bed low, nonskid socks on, safety measures intact, cb within reach. Pt encourage to call for assistance if needed.

## 2024-05-28 NOTE — CONSULTS
[facility]  Adult Nutrition  Consult Note         Reason for Assessment  Reason For Assessment: RD follow-up assess  Nutrition Risk Screen: no indicators present    Assessment and Plan  5/28/2024 RD Follow up: Patient continues on a 2000 CCD. Po intake remains good with 100% intake per flowsheets. Current weight 101.5 kg. Last BM 5/26. Continue current POC. RD Following.     Consult received and appreciated. Patient admitted 5/21 with a dx of R hip Fx s/p ORIF, Muscular weakness, and DM. Patient is ordered a 2000 calorie consistent carbohydrate diet. Po intake since admit is good with 100% per flowsheets.     Patient is 105 kg with a BMI of 30.54 which is overweight. Diet appropriate at this time. Recommend to continue diet as tolerated. RD Following.           Learning Needs/Social Determinants of Health  Learning Assessment       05/21/2024 1343 [facility] (5/21/2024 - Present)   Created by Karol Jackson, RN - RN (Nurse) Status: Complete                 PRIMARY LEARNER     Primary Learner Name:  Mr. Johnson TH - 05/21/2024 1343    Relationship:  Patient TH - 05/21/2024 1343    Does the primary learner have any barriers to learning?:  No Barriers TH - 05/21/2024 1343    What is the preferred language of the primary learner?:  English TH - 05/21/2024 1343    Is an  required?:  Yes TH - 05/21/2024 1343    How does the primary learner prefer to learn new concepts?:  Listening TH - 05/21/2024 1343    How often do you need to have someone help you read instructions, pamphlets, or written material from your doctor or pharmacy?:  Never TH - 05/21/2024 1343        CO-LEARNER #1     No question answered        CO-LEARNER #2     No question answered        SPECIAL TOPICS     No question answered        ANSWERED BY:     No question answered        Comments         Edit History       Karol Jackson, RN - RN (Nurse)   05/21/2024 1343                            Social Determinants of Health     Tobacco Use: Low Risk  (5/21/2024)    Patient History     Smoking Tobacco Use: Never     Smokeless Tobacco Use: Never     Passive Exposure: Past   Alcohol Use: Not At Risk (5/21/2024)    AUDIT-C     Frequency of Alcohol Consumption: Never     Average Number of Drinks: Patient does not drink     Frequency of Binge Drinking: Never   Financial Resource Strain: Low Risk  (5/21/2024)    Overall Financial Resource Strain (CARDIA)     Difficulty of Paying Living Expenses: Not hard at all   Food Insecurity: No Food Insecurity (5/21/2024)    Hunger Vital Sign     Worried About Running Out of Food in the Last Year: Never true     Ran Out of Food in the Last Year: Never true   Transportation Needs: No Transportation Needs (5/21/2024)    TRANSPORTATION NEEDS     Transportation : No   Physical Activity: Sufficiently Active (5/21/2024)    Exercise Vital Sign     Days of Exercise per Week: 5 days     Minutes of Exercise per Session: 30 min   Stress: No Stress Concern Present (5/21/2024)    [employer] [place] [employer] - Occupational Stress Questionnaire     Feeling of Stress : Not at all   Housing Stability: Low Risk  (5/21/2024)    Housing Stability Vital Sign     Unable to Pay for Housing in the Last Year: No     Homeless in the Last Year: No   Depression: Low Risk  (2/6/2024)    Depression     Last PHQ-4: Flowsheet Data: 1   Utilities: Not At Risk (5/21/2024)    [facility] Utilities     Threatened with loss of utilities: No   Health Literacy: Adequate Health Literacy (5/21/2024)     Health Literacy     Frequency of need for help with medical instructions: Never   Social Isolation: Socially Integrated (5/21/2024)    Social Isolation     Social Isolation: 1            Malnutrition  Is Patient Malnourished: No    Nutrition Diagnosis  Altered nutrition related laboratory values related to Diabetes Mellitus as evidenced by elevated BG  Comments: diet  appropriate    Recent Labs   Lab 05/27/24  0502 05/27/24  1052 05/28/24  1638   *  --   --    POCGLU  --    < > 141*    < > = values in this interval not displayed.     Comments on Glucose: HX dm    Nutrition Prescription / Recommendations  Recommendation/Intervention: Continue diet as tolerated  Goals: po intake % during admission  Nutrition Goal Status: goal met  Current Diet Order: 2000 calorie consistent carbohydrate  Chewing or Swallowing Difficulty?: No Chewing or swallowing difficulty  Recommended Diet: Consistent Carbohydrate 2000 (75g Carbs)  Recommended Oral Supplement: No Oral Supplements  Is Nutrition Support Recommended: [facility] Nutrition Support: No  Is Nutrition Education Recommended: No    Monitor and Evaluation  % current Intake: P.O. intake of 75 - 100 %  % intake to meet estimated needs: 75 - 100 %  Food and Nutrient Intake: energy intake, food and beverage intake  Food and Nutrient Adminstration: diet order  Anthropometric Measurements: height/length, weight, body mass index, weight change  Biochemical Data, Medical Tests and Procedures: electrolyte and renal panel, gastrointestinal profile, glucose/endocrine profile, inflammatory profile  Energy Calories Required: meeting needs  Protein Required: meeting needs  Fluid Required: meeting needs  Tolerance: tolerating    Current Medical Diagnosis and Past Medical History     Past Medical History:   Diagnosis Date    Diabetes mellitus     Diabetes mellitus, type 2     Glaucoma     High cholesterol     Hypertension        Nutrition/Diet History  Spiritual, Cultural Beliefs, Orthodox Practices, Values that Affect Care: no  Food Allergies: NKFA  Factors Affecting Nutritional Intake: None identified at this time    Lab/Procedures/Meds  Recent Labs   Lab 05/27/24  0502   *   K 4.1   BUN 26*   CREATININE 1.54*   CALCIUM 9.0   CL 93*   Na/K low consider replete to WNL  BUN/Cr elevated Hx CKD  Last A1c:   Lab Results   Component Value  "Date    HGBA1C 9.4 (H) 04/30/2024    HGBA1C 8.1 (H) 07/07/2022     Lab Results   Component Value Date    RBC 3.90 (L) 05/27/2024    HGB 11.5 (L) 05/27/2024    HCT 33.4 (L) 05/27/2024    MCV 85.6 05/27/2024    MCH 29.5 05/27/2024    MCHC 34.4 05/27/2024     Pertinent Labs Reviewed: reviewed  Pertinent Medications Reviewed: reviewed  Scheduled Meds:   amLODIPine  10 mg Oral Daily    aspirin  81 mg Oral BID    atorvastatin  80 mg Oral QHS    bimatoprost  1 drop Both Eyes QHS    carvediloL  3.125 mg Oral BID WM    cholecalciferol (vitamin D3)  5,000 Units Oral Daily    dorzolamide  1 drop Right Eye BID    losartan  100 mg Oral Daily    And    hydroCHLOROthiazide  25 mg Oral Daily    insulin detemir U-100  30 Units Subcutaneous QHS    liraglutide 0.6 mg/0.1 mL (18 mg/3 mL) subq PNIJ  1.8 mg Subcutaneous Daily    metFORMIN  500 mg Oral BID WM    pioglitazone  15 mg Oral Daily    polyethylene glycol  17 g Oral Daily    potassium chloride  20 mEq Oral BID    senna-docusate 8.6-50 mg  1 tablet Oral BID    sulfamethoxazole-trimethoprim 800-160mg  1 tablet Oral BID     Continuous Infusions:  PRN Meds:.  Current Facility-Administered Medications:     acetaminophen, 650 mg, Oral, Q6H PRN    calcium carbonate, 500 mg, Oral, BID PRN    lactulose, 30 g, Oral, Q6H PRN    melatonin, 6 mg, Oral, Nightly PRN    oxyCODONE-acetaminophen, 1 tablet, Oral, Q6H PRN    Anthropometrics  Temp: 97.4 °F (36.3 °C)  Height Method: Stated  Height: 6' 1" (185.4 cm)  Height (inches): 73 in  Weight Method: Standard Scale  Weight: 101.5 kg (223 lb 12.3 oz)  Weight (lb): 223.77 lb  Ideal Body Weight (IBW), Male: 184 lb  % Ideal Body Weight, Male (lb): 125.81 %  BMI (Calculated): 29.5       Estimated/Assessed Needs      Temp: 97.4 °F (36.3 °C)Oral  Weight Used For Calorie Calculations: 105 kg (231 lb 7.7 oz)   Energy Need Method: Kcal/kg Energy Calorie Requirements (kcal): 9925-3315  Weight Used For Protein Calculations: 105 kg (231 lb 7.7 oz)  Protein " Requirements:   Estimated Fluid Requirement Method: RDA Method    RDA Method (mL): 2100       Nutrition by Nursing  Diet/Nutrition Received: consistent carb/diabetic diet  Intake (%): 100%        Last Bowel Movement: 05/26/24 (per patient)                Nutrition Follow-Up  RD Follow-up?: Yes      Nutrition Discharge Planning: home with family; continue consistent carb diet on d/c          Available via Secure Chat

## 2024-05-28 NOTE — PLAN OF CARE
Problem: Adult Inpatient Plan of Care  Goal: Plan of Care Review  Outcome: Progressing  Goal: Patient-Specific Goal (Individualized)  Outcome: Progressing     Problem: Fall Injury Risk  Goal: Absence of Fall and Fall-Related Injury  Outcome: Progressing  Intervention: Identify and Manage Contributors  Flowsheets (Taken 5/28/2024 1638)  Self-Care Promotion:   independence encouraged   BADL personal objects within reach   BADL personal routines maintained  Medication Review/Management:   medications reviewed   high-risk medications identified  Intervention: Promote Injury-Free Environment  Flowsheets (Taken 5/28/2024 1638)  Safety Promotion/Fall Prevention:   assistive device/personal item within reach   diversional activities provided   Fall Risk reviewed with patient/family   Fall Risk signage in place   high risk medications identified   in recliner, wheels locked   medications reviewed   muscle strengthening facilitated   nonskid shoes/socks when out of bed   side rails raised x 3   instructed to call staff for mobility

## 2024-05-28 NOTE — PT/OT/SLP PROGRESS
Occupational Therapy   Treatment    Name: Milton Johnson  MRN: [id number]  Admitting Diagnosis:  Muscular weakness       Recommendations:     Discharge Recommendations:    Discharge Equipment Recommendations:  3-in-1 commode, walker, rolling  Barriers to discharge:       Assessment:     Milton Johnson is a 78 y.o. male with a medical diagnosis of Muscular weakness.   Performance deficits affecting function are weakness, impaired endurance, impaired self care skills, impaired functional mobility, impaired balance, decreased lower extremity function, decreased safety awareness.     Rehab Prognosis:  Good; patient would benefit from acute skilled OT services to address these deficits and reach maximum level of function.       Plan:     Patient to be seen 5 x/week to address the above listed problems via therapeutic exercises, therapeutic activities, self-care/home management  Plan of Care Expires:    Plan of Care Reviewed with: patient    Subjective     Chief Complaint: Decreased mobility  Patient/Family Comments/goals: Get stronger  Pain/Comfort:  Pain Rating 1: 3/10    Objective:     Communicated with: RN prior to session.  Patient found up in chair with   upon OT entry to room.    General Precautions: Standard, fall    Orthopedic Precautions:   Braces:    Respiratory Status: Room air     Occupational Performance:     Bed Mobility:    Patient was up in his chair already    Functional Mobility/Transfers:  Patient completed Sit <> Stand Transfer with minimum assistance  with  rolling walker   Functional Mobility: min a with RW    Activities of Daily Living:  Feeding S/u   Grooming S/u   Bathing Min a   UB dsg S/u   LB dsg Min a   Toileting Min a   Toilet t/f Min a           AMPAC 6 Click ADL: 22    Treatment & Education:  Pt educated on OT role/POC.   Importance of OOB activity with staff assistance.  Importance of sitting up in the chair throughout the day as tolerated, especially for meals   Safety during functional  t/f and mobility  Importance of assisting with self-care activities     Patient completed the following for increased strength to increase I with ADLs: UBE 8 min x 1x, 4# dowel- shoulder press, chest press, bicep curls x 40, red theraflex x 40 both ways, green theraband- scapular retraction x 40      Patient left up in chair with call button in reach and chair alarm on    GOALS:   Multidisciplinary Problems       Occupational Therapy Goals          Problem: Occupational Therapy    Goal Priority Disciplines Outcome Interventions   Occupational Therapy Goal     OT, PT/OT Progressing    Description: Description: Grooming Status:   Short Term Goal: Pt will perform grooming with s/u sitting EOB.   Long Term Goal: Pt will perform grooming/oral hygiene standing at sink with Mod I      LE dressing Status:   Short Term Goal: Pt will perform LE dressing with mod a.   Long Term Goal: Pt will perform LE dressing with min a.    Toileting Status:   Short Term Goal: Pt will perform toilet hygiene on BSC with min a.  Long Term Goal: Pt will perform toilet hygiene on toilet with no AE with s/u.    Commode Transfer:   Short Term Goal: Pt will perform BSC t/f with min a.  Long Term Goal:  Pt will perform toilet t/f in bathroom with s/u.     Bathing Status:   Long Term Goal: Pt will perform sponge bath with s/u with no unsafe fatigue.     Strength Status:   Long Term Goal: Pt to perform BUE strengthening with weights and/or body weight to increase ADL independence and safety    Endurance Status:   Short Term Goal:pt to perform 15 min OT treatment with 5 or greater rest breaks  Long Term Goal: pt to perform 30 min OT treat with 3 or less rest breaks                       Time Tracking:     OT Date of Treatment: 05/28/24  OT Start Time: 0811  OT Stop Time: 0835  OT Total Time (min): 24 min    Billable Minutes:Therapeutic Exercise 24 min  Jud Kaur OTR/L      5/28/2024

## 2024-05-28 NOTE — NURSING
Pt requested green lights on bed be turned off states they are keeping him from sleeping, explained to pt  it was a bed alarm we turned on for his safety to let us know if he attempts to get out of bed without calling for assist, he continues to ask me to turn it off and voices his understanding about the alarm and states he will not get up without calling for assist.

## 2024-05-28 NOTE — PLAN OF CARE
Problem: Diabetes Comorbidity  Goal: Blood Glucose Level Within Targeted Range  Outcome: Progressing     Problem: Fall Injury Risk  Goal: Absence of Fall and Fall-Related Injury  Outcome: Progressing     Problem: Infection  Goal: Absence of Infection Signs and Symptoms  Outcome: Progressing     Problem: VTE (Venous Thromboembolism)  Goal: Tissue Perfusion  Outcome: Not Progressing     Problem: Constipation  Goal: Effective Bowel Elimination  Outcome: Progressing

## 2024-05-28 NOTE — PLAN OF CARE
Problem: Occupational Therapy  Goal: Occupational Therapy Goal  Description: Description: Grooming Status:   Short Term Goal: Pt will perform grooming with s/u sitting EOB.   Long Term Goal: Pt will perform grooming/oral hygiene standing at sink with Mod I      LE dressing Status:   Short Term Goal: Pt will perform LE dressing with mod a.   Long Term Goal: Pt will perform LE dressing with min a.    Toileting Status:   Short Term Goal: Pt will perform toilet hygiene on BSC with min a.  Long Term Goal: Pt will perform toilet hygiene on toilet with no AE with s/u.    Commode Transfer:   Short Term Goal: Pt will perform BSC t/f with min a.  Long Term Goal:  Pt will perform toilet t/f in bathroom with s/u.     Bathing Status:   Long Term Goal: Pt will perform sponge bath with s/u with no unsafe fatigue.     Strength Status:   Long Term Goal: Pt to perform BUE strengthening with weights and/or body weight to increase ADL independence and safety    Endurance Status:   Short Term Goal:pt to perform 15 min OT treatment with 5 or greater rest breaks  Long Term Goal: pt to perform 30 min OT treat with 3 or less rest breaks  Outcome: Progressing

## 2024-05-28 NOTE — PLAN OF CARE
Problem: Adult Inpatient Plan of Care  Goal: Plan of Care Review  Outcome: Progressing  Goal: Patient-Specific Goal (Individualized)  Outcome: Progressing  Goal: Absence of Hospital-Acquired Illness or Injury  Outcome: Progressing  Goal: Optimal Comfort and Wellbeing  Outcome: Progressing     Problem: Fall Injury Risk  Goal: Absence of Fall and Fall-Related Injury  Outcome: Progressing     Problem: Wound  Goal: Skin Health and Integrity  Outcome: Progressing

## 2024-05-28 NOTE — PROGRESS NOTES
[facility]  Hospital Medicine  Progress Note    Patient Name: Milton Johnson  MRN: [id number]  Patient Class: IP- Swing   Admission Date: 5/21/2024  Length of Stay: 7 days  Attending Physician: Valentina Walker,*  Primary Care Provider: Dontrell Shetty MD        Subjective:     Principal Problem:Muscular weakness        HPI:  This 78 yr. Old WM fell at home and fractured his right hip. He underwent an ORIF of said fracture. He is here for therapy after surgery. He has hypertension, diabetes, hyperlipidemia, and hyponatremia. He was found to have a UTI while there. Culture is pending.    Overview/Hospital Course:  5/22/24 - pt. Is doing well so far. Voices no complaint. Will continue present treatment.    5/24/24 - pt. Is doing well. Eating. Now BS's are elevating. Orders revised.    5/28/24 - doing well. No complaints voiced. Will continue present treatment.    Interval History: doing well. Will continue present treatment.    Review of Systems  Objective:     Vital Signs (Most Recent):  Temp: 97.4 °F (36.3 °C) (05/28/24 0744)  Pulse: 73 (05/28/24 0744)  Resp: 18 (05/28/24 0744)  BP: 122/67 (05/28/24 0744)  SpO2: 97 % (05/28/24 0744) Vital Signs (24h Range):  Temp:  [97.4 °F (36.3 °C)-97.9 °F (36.6 °C)] 97.4 °F (36.3 °C)  Pulse:  [73-95] 73  Resp:  [18-20] 18  SpO2:  [94 %-97 %] 97 %  BP: ()/(67) 122/67     Weight: 101.5 kg (223 lb 12.3 oz)  Body mass index is 29.52 kg/m².    Intake/Output Summary (Last 24 hours) at 5/28/2024 0812  Last data filed at 5/28/2024 0808  Gross per 24 hour   Intake 480 ml   Output 400 ml   Net 80 ml         Physical Exam        Significant Labs: All pertinent labs within the past 24 hours have been reviewed.    Significant Imaging: I have reviewed all pertinent imaging results/findings within the past 24 hours.    Assessment/Plan:      No notes have been filed under this hospital service.  Service: Hospital Medicine    VTE Risk  Mitigation (From admission, onward)           Ordered     IP VTE LOW RISK PATIENT  Once         05/21/24 1355     Place ESTEFANIA hose  Until discontinued         05/21/24 1355                    Discharge Planning   ARACELI:      Code Status: Full Code   Is the patient medically ready for discharge?:     Reason for patient still in hospital (select all that apply): Patient trending condition  Discharge Plan A: Home with family                  Valentina Walker MD  Department of Hospital Medicine   [facility]

## 2024-05-28 NOTE — PLAN OF CARE
CM discussed with pt his need for a rolling walker. Order and clinicals faxed to [facility] per pt request.

## 2024-05-29 LAB
GLUCOSE SERPL-MCNC: 107 MG/DL (ref 70–105)
GLUCOSE SERPL-MCNC: 125 MG/DL (ref 70–105)
GLUCOSE SERPL-MCNC: 134 MG/DL (ref 70–105)
GLUCOSE SERPL-MCNC: 173 MG/DL (ref 70–105)

## 2024-05-29 PROCEDURE — 82962 GLUCOSE BLOOD TEST: CPT

## 2024-05-29 PROCEDURE — 63600175 PHARM REV CODE 636 W HCPCS: Performed by: FAMILY MEDICINE

## 2024-05-29 PROCEDURE — 94761 N-INVAS EAR/PLS OXIMETRY MLT: CPT

## 2024-05-29 PROCEDURE — 11000004 HC SNF PRIVATE

## 2024-05-29 PROCEDURE — 25000003 PHARM REV CODE 250: Performed by: EMERGENCY MEDICINE

## 2024-05-29 PROCEDURE — 25000003 PHARM REV CODE 250: Performed by: FAMILY MEDICINE

## 2024-05-29 RX ADMIN — DORZOLAMIDE HCL 1 DROP: 20 SOLUTION/ DROPS OPHTHALMIC at 08:05

## 2024-05-29 RX ADMIN — ASPIRIN 81 MG: 81 TABLET, COATED ORAL at 08:05

## 2024-05-29 RX ADMIN — POTASSIUM CHLORIDE 20 MEQ: 1500 TABLET, EXTENDED RELEASE ORAL at 08:05

## 2024-05-29 RX ADMIN — LOSARTAN POTASSIUM 100 MG: 100 TABLET, FILM COATED ORAL at 08:05

## 2024-05-29 RX ADMIN — AMLODIPINE BESYLATE 10 MG: 10 TABLET ORAL at 08:05

## 2024-05-29 RX ADMIN — SULFAMETHOXAZOLE AND TRIMETHOPRIM 1 TABLET: 800; 160 TABLET ORAL at 08:05

## 2024-05-29 RX ADMIN — CHOLECALCIFEROL TAB 125 MCG (5000 UNIT) 5000 UNITS: 125 TAB at 08:05

## 2024-05-29 RX ADMIN — OXYCODONE HYDROCHLORIDE AND ACETAMINOPHEN 1 TABLET: 10; 325 TABLET ORAL at 12:05

## 2024-05-29 RX ADMIN — INSULIN DETEMIR 30 UNITS: 100 INJECTION, SOLUTION SUBCUTANEOUS at 08:05

## 2024-05-29 RX ADMIN — PIOGLITAZONE 15 MG: 15 TABLET ORAL at 08:05

## 2024-05-29 RX ADMIN — BIMATOPROST 1 DROP: 0.1 SOLUTION/ DROPS OPHTHALMIC at 08:05

## 2024-05-29 RX ADMIN — ATORVASTATIN CALCIUM 80 MG: 40 TABLET, FILM COATED ORAL at 08:05

## 2024-05-29 RX ADMIN — METFORMIN HYDROCHLORIDE 500 MG: 500 TABLET, FILM COATED ORAL at 08:05

## 2024-05-29 RX ADMIN — CARVEDILOL 3.12 MG: 3.12 TABLET, FILM COATED ORAL at 08:05

## 2024-05-29 RX ADMIN — POLYETHYLENE GLYCOL 3350 17 G: 17 POWDER, FOR SOLUTION ORAL at 08:05

## 2024-05-29 RX ADMIN — CARVEDILOL 3.12 MG: 3.12 TABLET, FILM COATED ORAL at 04:05

## 2024-05-29 RX ADMIN — SENNOSIDES AND DOCUSATE SODIUM 1 TABLET: 8.6; 5 TABLET ORAL at 08:05

## 2024-05-29 RX ADMIN — HYDROCHLOROTHIAZIDE 25 MG: 25 TABLET ORAL at 08:05

## 2024-05-29 RX ADMIN — LACTULOSE 30 G: 20 SOLUTION ORAL at 05:05

## 2024-05-29 RX ADMIN — METFORMIN HYDROCHLORIDE 500 MG: 500 TABLET, FILM COATED ORAL at 04:05

## 2024-05-29 NOTE — PLAN OF CARE
Problem: Diabetes Comorbidity  Goal: Blood Glucose Level Within Targeted Range  Outcome: Progressing     Problem: Pain Acute  Goal: Optimal Pain Control and Function  Outcome: Progressing     Problem: Fall Injury Risk  Goal: Absence of Fall and Fall-Related Injury  Outcome: Progressing     Problem: Infection  Goal: Absence of Infection Signs and Symptoms  Outcome: Progressing     Problem: VTE (Venous Thromboembolism)  Goal: Tissue Perfusion  Outcome: Progressing     Problem: Constipation  Goal: Effective Bowel Elimination  Outcome: Progressing

## 2024-05-29 NOTE — NURSING
Patient Rounds   by Yoly Alford, Patient Care Assistant at 5/28/24 1850   Patient Rounds  Supper tray delivered/set up. No family at bedside.   by Leia Hamilton, KRUPA at 5/28/24 1705   Patient Rounds   by Yoly Alford, Patient Care Assistant at 5/28/24 1612   Patient Rounds  Lying in bed talking with visitor at bedside. NADN. No needs/complaints voiced.   by Leia Hamilton RN at 5/28/24 1509   Patient Rounds   by Yoly Alford, Patient Care Assistant at 5/28/24 1428   Patient Rounds  Lying in bed awake. NADN. Refilled ice/water pitcher. Visitor at bedside. No other needs/complaints voiced.   by Leia Hamilton RN at 5/28/24 1319   Patient Rounds   by Yoly Alford, Patient Care Assistant at 5/28/24 1212   Patient Rounds  Lunch tray delivered/set up. No family at bedside.   by Leia Hamilton RN at 5/28/24 1105   Patient Rounds  Accu check is 152   by Leia Hamilton, RN at 5/28/24 1034   Patient Rounds   by Yoly Alford, Patient Care Assistant at 5/28/24 1017   Patient Rounds  In chair awake. NADN. Routine meds given. No needs/complaints voiced.   by Leia Hamilton RN at 5/28/24 0923   Patient Rounds   by Yoly Alford, Patient Care Assistant at 5/28/24 0808   Patient Rounds  In chair awake. NADN. No c/o pain, 3 dressings noted c/d/i to R hip/leg. No family at bedside. No needs/complaints voiced   by Leia Hamilton, KRUPA at 5/28/24 5138

## 2024-05-29 NOTE — NURSING
Received pt in bed, with glasses on watching tv. , aox4, speech clear . Denies pain and discomfort. Not complaints voiced. Urinal, water, cb within reach. Safety measures intact, bed low, alarm on. Pt encouraged to call for assistance if needed.

## 2024-05-29 NOTE — ADDENDUM NOTE
Encounter addended by: Hazel Cardozo on: 5/29/2024 8:26 AM   Actions taken: Charge Capture section accepted

## 2024-05-29 NOTE — PROGRESS NOTES
Patient refused therapy in morning and afternoon secondary to getting laxative and is unable to be far from bathroom

## 2024-05-30 LAB
ANION GAP SERPL CALCULATED.3IONS-SCNC: 13 MMOL/L (ref 7–16)
BASOPHILS # BLD AUTO: 0.08 K/UL (ref 0–0.2)
BASOPHILS NFR BLD AUTO: 0.7 % (ref 0–1)
BUN SERPL-MCNC: 27 MG/DL (ref 7–18)
BUN/CREAT SERPL: 16 (ref 6–20)
CALCIUM SERPL-MCNC: 9 MG/DL (ref 8.5–10.1)
CHLORIDE SERPL-SCNC: 95 MMOL/L (ref 98–107)
CO2 SERPL-SCNC: 24 MMOL/L (ref 21–32)
CREAT SERPL-MCNC: 1.69 MG/DL (ref 0.7–1.3)
DIFFERENTIAL METHOD BLD: ABNORMAL
EGFR (NO RACE VARIABLE) (RUSH/TITUS): 41 ML/MIN/1.73M2
EOSINOPHIL # BLD AUTO: 0.16 K/UL (ref 0–0.5)
EOSINOPHIL NFR BLD AUTO: 1.5 % (ref 1–4)
ERYTHROCYTE [DISTWIDTH] IN BLOOD BY AUTOMATED COUNT: 13.6 % (ref 11.5–14.5)
GLUCOSE SERPL-MCNC: 116 MG/DL (ref 74–106)
GLUCOSE SERPL-MCNC: 130 MG/DL (ref 70–105)
GLUCOSE SERPL-MCNC: 141 MG/DL (ref 70–105)
GLUCOSE SERPL-MCNC: 157 MG/DL (ref 70–105)
HCT VFR BLD AUTO: 34.7 % (ref 40–54)
HGB BLD-MCNC: 11.6 G/DL (ref 13.5–18)
IMM GRANULOCYTES # BLD AUTO: 0.07 K/UL (ref 0–0.04)
IMM GRANULOCYTES NFR BLD: 0.6 % (ref 0–0.4)
LYMPHOCYTES # BLD AUTO: 1.12 K/UL (ref 1–4.8)
LYMPHOCYTES NFR BLD AUTO: 10.2 % (ref 27–41)
MCH RBC QN AUTO: 29.4 PG (ref 27–31)
MCHC RBC AUTO-ENTMCNC: 33.4 G/DL (ref 32–36)
MCV RBC AUTO: 87.8 FL (ref 80–96)
MONOCYTES # BLD AUTO: 0.64 K/UL (ref 0–0.8)
MONOCYTES NFR BLD AUTO: 5.9 % (ref 2–6)
MPC BLD CALC-MCNC: 8.7 FL (ref 9.4–12.4)
NEUTROPHILS # BLD AUTO: 8.86 K/UL (ref 1.8–7.7)
NEUTROPHILS NFR BLD AUTO: 81.1 % (ref 53–65)
NRBC # BLD AUTO: 0 X10E3/UL
NRBC, AUTO (.00): 0 %
PLATELET # BLD AUTO: 354 K/UL (ref 150–400)
POTASSIUM SERPL-SCNC: 4.6 MMOL/L (ref 3.5–5.1)
RBC # BLD AUTO: 3.95 M/UL (ref 4.6–6.2)
SODIUM SERPL-SCNC: 127 MMOL/L (ref 136–145)
WBC # BLD AUTO: 10.93 K/UL (ref 4.5–11)

## 2024-05-30 PROCEDURE — 25000003 PHARM REV CODE 250: Performed by: EMERGENCY MEDICINE

## 2024-05-30 PROCEDURE — 36415 COLL VENOUS BLD VENIPUNCTURE: CPT | Performed by: FAMILY MEDICINE

## 2024-05-30 PROCEDURE — 25000003 PHARM REV CODE 250: Performed by: FAMILY MEDICINE

## 2024-05-30 PROCEDURE — 97116 GAIT TRAINING THERAPY: CPT

## 2024-05-30 PROCEDURE — 94761 N-INVAS EAR/PLS OXIMETRY MLT: CPT

## 2024-05-30 PROCEDURE — 82962 GLUCOSE BLOOD TEST: CPT

## 2024-05-30 PROCEDURE — 11000004 HC SNF PRIVATE

## 2024-05-30 PROCEDURE — 97110 THERAPEUTIC EXERCISES: CPT

## 2024-05-30 PROCEDURE — 63600175 PHARM REV CODE 636 W HCPCS: Performed by: FAMILY MEDICINE

## 2024-05-30 PROCEDURE — 97530 THERAPEUTIC ACTIVITIES: CPT

## 2024-05-30 PROCEDURE — 85025 COMPLETE CBC W/AUTO DIFF WBC: CPT | Performed by: FAMILY MEDICINE

## 2024-05-30 PROCEDURE — 80048 BASIC METABOLIC PNL TOTAL CA: CPT | Performed by: FAMILY MEDICINE

## 2024-05-30 RX ADMIN — ATORVASTATIN CALCIUM 80 MG: 40 TABLET, FILM COATED ORAL at 08:05

## 2024-05-30 RX ADMIN — SENNOSIDES AND DOCUSATE SODIUM 1 TABLET: 8.6; 5 TABLET ORAL at 09:05

## 2024-05-30 RX ADMIN — CARVEDILOL 3.12 MG: 3.12 TABLET, FILM COATED ORAL at 04:05

## 2024-05-30 RX ADMIN — BIMATOPROST 1 DROP: 0.1 SOLUTION/ DROPS OPHTHALMIC at 08:05

## 2024-05-30 RX ADMIN — LOSARTAN POTASSIUM 100 MG: 100 TABLET, FILM COATED ORAL at 09:05

## 2024-05-30 RX ADMIN — METFORMIN HYDROCHLORIDE 500 MG: 500 TABLET, FILM COATED ORAL at 09:05

## 2024-05-30 RX ADMIN — ASPIRIN 81 MG: 81 TABLET, COATED ORAL at 09:05

## 2024-05-30 RX ADMIN — SULFAMETHOXAZOLE AND TRIMETHOPRIM 1 TABLET: 800; 160 TABLET ORAL at 08:05

## 2024-05-30 RX ADMIN — HYDROCHLOROTHIAZIDE 25 MG: 25 TABLET ORAL at 09:05

## 2024-05-30 RX ADMIN — DORZOLAMIDE HCL 1 DROP: 20 SOLUTION/ DROPS OPHTHALMIC at 09:05

## 2024-05-30 RX ADMIN — AMLODIPINE BESYLATE 10 MG: 10 TABLET ORAL at 09:05

## 2024-05-30 RX ADMIN — POTASSIUM CHLORIDE 20 MEQ: 1500 TABLET, EXTENDED RELEASE ORAL at 09:05

## 2024-05-30 RX ADMIN — METFORMIN HYDROCHLORIDE 500 MG: 500 TABLET, FILM COATED ORAL at 04:05

## 2024-05-30 RX ADMIN — CHOLECALCIFEROL TAB 125 MCG (5000 UNIT) 5000 UNITS: 125 TAB at 09:05

## 2024-05-30 RX ADMIN — SULFAMETHOXAZOLE AND TRIMETHOPRIM 1 TABLET: 800; 160 TABLET ORAL at 09:05

## 2024-05-30 RX ADMIN — ASPIRIN 81 MG: 81 TABLET, COATED ORAL at 08:05

## 2024-05-30 RX ADMIN — DORZOLAMIDE HCL 1 DROP: 20 SOLUTION/ DROPS OPHTHALMIC at 08:05

## 2024-05-30 RX ADMIN — POTASSIUM CHLORIDE 20 MEQ: 1500 TABLET, EXTENDED RELEASE ORAL at 08:05

## 2024-05-30 RX ADMIN — PIOGLITAZONE 15 MG: 15 TABLET ORAL at 09:05

## 2024-05-30 RX ADMIN — SENNOSIDES AND DOCUSATE SODIUM 1 TABLET: 8.6; 5 TABLET ORAL at 08:05

## 2024-05-30 RX ADMIN — CARVEDILOL 3.12 MG: 3.12 TABLET, FILM COATED ORAL at 09:05

## 2024-05-30 RX ADMIN — INSULIN DETEMIR 30 UNITS: 100 INJECTION, SOLUTION SUBCUTANEOUS at 09:05

## 2024-05-30 NOTE — PLAN OF CARE
[facility] - Swing Bed   Interdisciplinary Team Meeting    Patient: Milton Johnson   Today's Date: 5/30/2024   Estimated D/C Date:         Physician: Valentina Walker MD Unit Director: Rose Mary Em RN   Pharmacy: Julieth Richardson, LaliD Nursing: TOBIAS Lopez LPN   : Johana Patel RN Physical/Occupational Therapy: Jud Kaur OT, PELON Waddell PT   Speech Therapy: ST Matt Respiratory: See respiratory notes   Dietary: See dietary notes   Other: MISBAH Doshi, RT     Nursing  New Symptoms/Problems: none at this time      Urine: continent  Jackson: No   Bowel: continent  Last Bowel Movement: 05/29/24   Constipated: No  Diarrhea: No   Isolation: No  Cognition: WNL  Aspiration Precautions: No  Wound Care: Yes  Wound Location/Tx: right hip  Comment(s): n/a     Respiratory  O2 Device: Room Air  O2 Flow: n/a  SpO2: 99%  Neb Tx: No  Comment(s): n/a     Dietary  Nutrition: Diabetic  Comment(s): eating well    Speech Therapy  Speech/Swallowing: No current speech or swallowing issues  Comment(s): n/a    Physical Therapy  Gait/Assistive Device: Approx. 120' x 2 with RW and CGA x 1 on level indoor surface with verbal and visual cues for proper AD/LE sequencing.  ELOS: Plan to DC     Transfers: Contact Guard Assistance  Bed Mobility: Activity did not occur Range of Motion/Restrictions:  RLE weight bearing as tolerated   Comment(s): n/a      Occupational Therapy  Eating/Grooming: Supervision or Set-up Assistance Toileting: Minimal Assistance   Bathing: Minimal Assistance Dressing (Upper Body): Supervision or Set-up Assistance   Dressing (Lower Body): Minimal Assistance   Comment(s): n/a   Activity Therapy  Level of participation: Active participation  Comment(s): n/a    Pharmacy  Medication Changes: No  Labs Reviewed: Yes  New Lab Orders: No  Comment(s): n/a      Tx Plan/Recommendations reviewed with family and/or patient on 5/29/24.  Additional family  Conference/Training: not at this time  D/C Plan/Recommendations: Home with HH and Home with family  ARACELI:   Comment(s): pt has received new rolling walker and has requested BSC

## 2024-05-30 NOTE — PT/OT/SLP PROGRESS
Occupational Therapy   Treatment    Name: Milton Johnson  MRN: [id number]  Admitting Diagnosis:  Muscular weakness       Recommendations:     Discharge Recommendations:    Discharge Equipment Recommendations:  3-in-1 commode, walker, rolling  Barriers to discharge:       Assessment:     Milton Johnson is a 78 y.o. male with a medical diagnosis of Muscular weakness.   Performance deficits affecting function are weakness, impaired endurance, impaired self care skills, impaired functional mobility, impaired balance, decreased lower extremity function, decreased safety awareness.     Rehab Prognosis:  Good; patient would benefit from acute skilled OT services to address these deficits and reach maximum level of function.       Plan:     Patient to be seen 5 x/week to address the above listed problems via therapeutic exercises, therapeutic activities, self-care/home management  Plan of Care Expires:    Plan of Care Reviewed with: patient    Subjective     Chief Complaint: Decreased mobility  Patient/Family Comments/goals: Get stronger  Pain/Comfort:  Pain Rating 1: 0/10    Objective:     Communicated with: RN prior to session.  Patient found up in chair with   upon OT entry to room.    General Precautions: Standard, fall    Orthopedic Precautions:   Braces:    Respiratory Status: Room air     Occupational Performance:     Bed Mobility:    Patient was up in his chair already    Functional Mobility/Transfers:  Patient completed Sit <> Stand Transfer with minimum assistance  with  rolling walker   Functional Mobility: min a with RW    Activities of Daily Living:  Feeding S/u   Grooming S/u   Bathing Min a   UB dsg S/u   LB dsg Min a   Toileting Min a   Toilet t/f Min a           AMPAC 6 Click ADL: 22    Treatment & Education:  Pt educated on OT role/POC.   Importance of OOB activity with staff assistance.  Importance of sitting up in the chair throughout the day as tolerated, especially for meals   Safety during functional  t/f and mobility  Importance of assisting with self-care activities     Patient completed the following for increased strength to increase I with ADLs: UBE 8 min x 1x, 5# dowel- shoulder press, chest press, bicep curls x 40, red theraflex x 40 both ways, green theraband- scapular retraction x 40      Patient left up in chair with call button in reach and chair alarm on    GOALS:   Multidisciplinary Problems       Occupational Therapy Goals          Problem: Occupational Therapy    Goal Priority Disciplines Outcome Interventions   Occupational Therapy Goal     OT, PT/OT Progressing    Description: Description: Grooming Status:   Short Term Goal: Pt will perform grooming with s/u sitting EOB.   Long Term Goal: Pt will perform grooming/oral hygiene standing at sink with Mod I      LE dressing Status:   Short Term Goal: Pt will perform LE dressing with mod a.   Long Term Goal: Pt will perform LE dressing with min a.    Toileting Status:   Short Term Goal: Pt will perform toilet hygiene on BSC with min a.  Long Term Goal: Pt will perform toilet hygiene on toilet with no AE with s/u.    Commode Transfer:   Short Term Goal: Pt will perform BSC t/f with min a.  Long Term Goal:  Pt will perform toilet t/f in bathroom with s/u.     Bathing Status:   Long Term Goal: Pt will perform sponge bath with s/u with no unsafe fatigue.     Strength Status:   Long Term Goal: Pt to perform BUE strengthening with weights and/or body weight to increase ADL independence and safety    Endurance Status:   Short Term Goal:pt to perform 15 min OT treatment with 5 or greater rest breaks  Long Term Goal: pt to perform 30 min OT treat with 3 or less rest breaks                       Time Tracking:     OT Date of Treatment: 05/30/24  OT Start Time: 0900  OT Stop Time: 0930  OT Total Time (min): 30 min    Billable Minutes:Therapeutic Exercise 30 min  Jud Kaur OTR/L      5/30/2024

## 2024-05-30 NOTE — PT/OT/SLP PROGRESS
Physical Therapy Treatment    Patient Name:  Milton Johnson   MRN:  [id number]    Recommendations:     Discharge Recommendations: Low Intensity Therapy  Discharge Equipment Recommendations: 3-in-1 commode, walker, rolling  Barriers to discharge: None    Assessment:     Milton Johnson is a 78 y.o. male admitted with a medical diagnosis of Muscular weakness.  He presents with the following impairments/functional limitations: weakness, impaired endurance, impaired functional mobility, gait instability, impaired balance, decreased lower extremity function, pain .  PT provided visual and verbal cues to progress through LE exercises with correct form, speed, and movement through full ROM. Patient also required cueing for improved sequencing to increase ease of sit to stand transfer. PT provided verbal cues and visual demonstration for increased step through gait pattern during gait training. Patient had no adverse effects to PT treatment.     Rehab Prognosis: Good; patient would benefit from acute skilled PT services to address these deficits and reach maximum level of function.    Recent Surgery: * No surgery found *      Plan:     During this hospitalization, patient to be seen 5 x/week to address the identified rehab impairments via gait training, therapeutic activities, therapeutic exercises and progress toward the following goals:    Plan of Care Expires:  06/11/24    Subjective     Chief Complaint: Decreased mobility   Patient/Family Comments/goals: Get stronger   Pain/Comfort:  Pain Rating 1: 0/10      Objective:     Communicated with skilled nursing prior to session.  Patient found seated in suraj chair. He denies pain and is agreeable to PT treatment.   General Precautions: Standard, fall  Orthopedic Precautions: RLE weight bearing as tolerated  Braces: N/A  Respiratory Status: Room air     Functional Mobility:  Transfers:     Sit to Stand:  contact guard assistance with rolling walker  Gait: Approx. 120' x 1   "with RW and CGA  x 1 on level indoor surface with verbal and visual cues for progression to step through gait pattern with decreased weight bearing on assistive device.        AM-PAC 6 CLICK MOBILITY  Turning over in bed (including adjusting bedclothes, sheets and blankets)?: 3  Sitting down on and standing up from a chair with arms (e.g., wheelchair, bedside commode, etc.): 3  Moving from lying on back to sitting on the side of the bed?: 3  Moving to and from a bed to a chair (including a wheelchair)?: 3  Need to walk in hospital room?: 3  Climbing 3-5 steps with a railing?: 1  Basic Mobility Total Score: 16       Therapeutic-exercise  Reps        Hip ADD  3 x 10 3"    Hip ABD  2 x 10 green TB    SAQs  2 x 10 1.5# each    LAQs  2 x 10 1.5# each    Hamstring Curls  2 x 10 green TB                Therapeutic-Activities  Reps        Sit<>stand  3 x 5 reps    Standing March  2 x 10                GOALS:   Multidisciplinary Problems       Physical Therapy Goals          Problem: Physical Therapy    Goal Priority Disciplines Outcome Goal Variances Interventions   Physical Therapy Goal     PT, PT/OT      Description: Short Term Goals  1. Patient will complete 30 reps of B LE exercises with correct form.   2. Patient will complete sit<>stand transfers with SBA.  3. Patient will ambulate 100 feet with RW on level surfaces with CGA.     Long Term Goals   1. Patient will ambulate 300 feet with RW on level and unlevel surfaces with SBA.  2. Patient will complete all functional transfers with MOD I.  3. Patient will negotiate up and down 2 stairs with use of handrail with SBA.                        Time Tracking:     PT Received On: 05/30/24  PT Start Time: 0810     PT Stop Time: 0855  PT Total Time (min): 45 min     Billable Minutes: Gait 8, Therapeutic activities 15, Therapeutic exercise 22      Treatment Type: Treatment  PT/PTA: PT     Number of PTA visits since last PT visit: 0     Continue Plan of Care Per PT order to " progress patient toward rehab goals as tolerated by patient.   Katelynn Waddell, PT, DPT     05/30/2024

## 2024-05-30 NOTE — PLAN OF CARE
Problem: Adult Inpatient Plan of Care  Goal: Plan of Care Review  Outcome: Progressing  Flowsheets (Taken 5/29/2024 1927)  Plan of Care Reviewed With: patient  Goal: Patient-Specific Goal (Individualized)  Outcome: Progressing  Goal: Absence of Hospital-Acquired Illness or Injury  Outcome: Progressing  Intervention: Prevent Infection  Flowsheets (Taken 5/29/2024 1927)  Infection Prevention:   environmental surveillance performed   equipment surfaces disinfected   hand hygiene promoted   personal protective equipment utilized   rest/sleep promoted   single patient room provided  Goal: Optimal Comfort and Wellbeing  Outcome: Progressing  Intervention: Provide Person-Centered Care  Flowsheets (Taken 5/29/2024 1927)  Trust Relationship/Rapport:   choices provided   emotional support provided   empathic listening provided   questions encouraged   thoughts/feelings acknowledged   reassurance provided   questions answered   care explained  Goal: Readiness for Transition of Care  Outcome: Progressing  Intervention: Mutually Develop Transition Plan  Flowsheets (Taken 5/29/2024 1927)  Equipment Currently Used at Home: none  Transportation Anticipated: family or friend will provide  Communicated ARACELI with patient/caregiver: Date not available/Unable to determine  Do you expect to return to your current living situation?: Yes  Do you have help at home or someone to help you manage your care at home?: Yes  Readmission within 30 days?: No  Do you currently have service(s) that help you manage your care at home?: No      Rhomboid Transposition Flap Text: The defect edges were debeveled with a #15 scalpel blade.  Given the location of the defect and the proximity to free margins a rhomboid transposition flap was deemed most appropriate.  Using a sterile surgical marker, an appropriate rhomboid flap was drawn incorporating the defect.    The area thus outlined was incised deep to adipose tissue with a #15 scalpel blade.  The skin margins were undermined to an appropriate distance in all directions utilizing iris scissors.

## 2024-05-31 LAB
GLUCOSE SERPL-MCNC: 122 MG/DL (ref 70–105)
GLUCOSE SERPL-MCNC: 157 MG/DL (ref 70–105)
GLUCOSE SERPL-MCNC: 193 MG/DL (ref 70–105)
GLUCOSE SERPL-MCNC: 245 MG/DL (ref 70–105)

## 2024-05-31 PROCEDURE — 99308 SBSQ NF CARE LOW MDM 20: CPT | Mod: ,,, | Performed by: FAMILY MEDICINE

## 2024-05-31 PROCEDURE — 94761 N-INVAS EAR/PLS OXIMETRY MLT: CPT

## 2024-05-31 PROCEDURE — 97535 SELF CARE MNGMENT TRAINING: CPT

## 2024-05-31 PROCEDURE — 97110 THERAPEUTIC EXERCISES: CPT | Mod: CQ

## 2024-05-31 PROCEDURE — 82962 GLUCOSE BLOOD TEST: CPT

## 2024-05-31 PROCEDURE — 97110 THERAPEUTIC EXERCISES: CPT

## 2024-05-31 PROCEDURE — 25000003 PHARM REV CODE 250: Performed by: EMERGENCY MEDICINE

## 2024-05-31 PROCEDURE — 63600175 PHARM REV CODE 636 W HCPCS: Performed by: FAMILY MEDICINE

## 2024-05-31 PROCEDURE — 11000004 HC SNF PRIVATE

## 2024-05-31 PROCEDURE — 97116 GAIT TRAINING THERAPY: CPT | Mod: CQ

## 2024-05-31 PROCEDURE — 25000003 PHARM REV CODE 250: Performed by: FAMILY MEDICINE

## 2024-05-31 RX ADMIN — HYDROCHLOROTHIAZIDE 25 MG: 25 TABLET ORAL at 08:05

## 2024-05-31 RX ADMIN — METFORMIN HYDROCHLORIDE 500 MG: 500 TABLET, FILM COATED ORAL at 08:05

## 2024-05-31 RX ADMIN — SULFAMETHOXAZOLE AND TRIMETHOPRIM 1 TABLET: 800; 160 TABLET ORAL at 08:05

## 2024-05-31 RX ADMIN — CHOLECALCIFEROL TAB 125 MCG (5000 UNIT) 5000 UNITS: 125 TAB at 08:05

## 2024-05-31 RX ADMIN — POTASSIUM CHLORIDE 20 MEQ: 1500 TABLET, EXTENDED RELEASE ORAL at 08:05

## 2024-05-31 RX ADMIN — LOSARTAN POTASSIUM 100 MG: 100 TABLET, FILM COATED ORAL at 08:05

## 2024-05-31 RX ADMIN — DORZOLAMIDE HCL 1 DROP: 20 SOLUTION/ DROPS OPHTHALMIC at 08:05

## 2024-05-31 RX ADMIN — BIMATOPROST 1 DROP: 0.1 SOLUTION/ DROPS OPHTHALMIC at 08:05

## 2024-05-31 RX ADMIN — INSULIN DETEMIR 30 UNITS: 100 INJECTION, SOLUTION SUBCUTANEOUS at 08:05

## 2024-05-31 RX ADMIN — ATORVASTATIN CALCIUM 80 MG: 40 TABLET, FILM COATED ORAL at 08:05

## 2024-05-31 RX ADMIN — POLYETHYLENE GLYCOL 3350 17 G: 17 POWDER, FOR SOLUTION ORAL at 08:05

## 2024-05-31 RX ADMIN — AMLODIPINE BESYLATE 10 MG: 10 TABLET ORAL at 08:05

## 2024-05-31 RX ADMIN — SENNOSIDES AND DOCUSATE SODIUM 1 TABLET: 8.6; 5 TABLET ORAL at 08:05

## 2024-05-31 RX ADMIN — PIOGLITAZONE 15 MG: 15 TABLET ORAL at 08:05

## 2024-05-31 RX ADMIN — METFORMIN HYDROCHLORIDE 500 MG: 500 TABLET, FILM COATED ORAL at 05:05

## 2024-05-31 RX ADMIN — CARVEDILOL 3.12 MG: 3.12 TABLET, FILM COATED ORAL at 05:05

## 2024-05-31 RX ADMIN — ASPIRIN 81 MG: 81 TABLET, COATED ORAL at 08:05

## 2024-05-31 RX ADMIN — CARVEDILOL 3.12 MG: 3.12 TABLET, FILM COATED ORAL at 08:05

## 2024-05-31 NOTE — PLAN OF CARE
Problem: Adult Inpatient Plan of Care  Goal: Plan of Care Review  Outcome: Progressing  Goal: Patient-Specific Goal (Individualized)  Outcome: Progressing     Problem: Fall Injury Risk  Goal: Absence of Fall and Fall-Related Injury  Outcome: Progressing  Intervention: Identify and Manage Contributors  Flowsheets (Taken 5/31/2024 1736)  Self-Care Promotion:   independence encouraged   BADL personal objects within reach   BADL personal routines maintained  Medication Review/Management:   medications reviewed   high-risk medications identified  Intervention: Promote Injury-Free Environment  Flowsheets (Taken 5/31/2024 1736)  Safety Promotion/Fall Prevention:   assistive device/personal item within reach   chair alarm set   diversional activities provided   Fall Risk reviewed with patient/family   Fall Risk signage in place   high risk medications identified   in recliner, wheels locked   medications reviewed   muscle strengthening facilitated   nonskid shoes/socks when out of bed   side rails raised x 3   instructed to call staff for mobility

## 2024-05-31 NOTE — SUBJECTIVE & OBJECTIVE
Interval History: doing well. Will continue present treatment.    Review of Systems  Objective:     Vital Signs (Most Recent):  Temp: 97.6 °F (36.4 °C) (05/31/24 0709)  Pulse: 77 (05/31/24 0726)  Resp: 18 (05/31/24 0726)  BP: (!) 108/57 (05/31/24 0709)  SpO2: 99 % (05/31/24 0726) Vital Signs (24h Range):  Temp:  [97.4 °F (36.3 °C)-97.6 °F (36.4 °C)] 97.6 °F (36.4 °C)  Pulse:  [76-91] 77  Resp:  [18] 18  SpO2:  [96 %-99 %] 99 %  BP: (107-108)/(57-67) 108/57     Weight: 101.5 kg (223 lb 12.3 oz)  Body mass index is 29.52 kg/m².    Intake/Output Summary (Last 24 hours) at 5/31/2024 0803  Last data filed at 5/30/2024 1141  Gross per 24 hour   Intake 240 ml   Output --   Net 240 ml         Physical Exam        Significant Labs: All pertinent labs within the past 24 hours have been reviewed.    Significant Imaging: I have reviewed all pertinent imaging results/findings within the past 24 hours.

## 2024-05-31 NOTE — PT/OT/SLP PROGRESS
Physical Therapy Treatment    Patient Name:  Milton Johnson   MRN:  [id number]    Recommendations:     Discharge Recommendations: Low Intensity Therapy  Discharge Equipment Recommendations: walker, rolling  Barriers to discharge: None    Assessment:     Milton Johnson is a 78 y.o. male admitted with a medical diagnosis of Muscular weakness.  He presents with the following impairments/functional limitations: weakness, impaired endurance, impaired self care skills, impaired functional mobility, gait instability, decreased upper extremity function, decreased lower extremity function .  Patient requires mod verbal and visual cues to complete standing Therapeutic activities with proper alignment, speed of movement, posture, and count.  Patient requires cues occasionally to stay close to walker to increase MILDRED and for upright posture during gait training.  Occasional tactile cues provided to prevent pathway deviations and LOB.       Rehab Prognosis: Good; patient would benefit from acute skilled PT services to address these deficits and reach maximum level of function.    Recent Surgery: * No surgery found *      Plan:     During this hospitalization, patient to be seen 5 x/week to address the identified rehab impairments via gait training, therapeutic activities, therapeutic exercises and progress toward the following goals:    Plan of Care Expires:  06/11/24    Subjective     Chief Complaint: Decreased mobility   Patient/Family Comments/goals: Get stronger   Pain/Comfort:  Pain Rating 1: 0/10      Objective:     Communicated with skilled nursing prior to session.  Patient found alert with HOB elevated in his room.     General Precautions: Standard, fall  Orthopedic Precautions: RLE weight bearing as tolerated  Braces: N/A  Respiratory Status: Room air     Functional Mobility:  Transfers:     Sit to Stand:  contact guard assistance with rolling walker  Gait: Approx. 120' x 2  with RW and CGA  x 1 on level indoor surface  with verbal and visual cues for proper AD/LE sequencing.        AM-PAC 6 CLICK MOBILITY  Turning over in bed (including adjusting bedclothes, sheets and blankets)?: 3  Sitting down on and standing up from a chair with arms (e.g., wheelchair, bedside commode, etc.): 3  Moving from lying on back to sitting on the side of the bed?: 3  Moving to and from a bed to a chair (including a wheelchair)?: 3  Need to walk in hospital room?: 3  Climbing 3-5 steps with a railing?: 3  Basic Mobility Total Score: 18       Ther-Ex Reps       Ankle pumps    Quad sets    Horizontal Hip Abduction/Hip ADD    Hip ADD 3 x 10   Heelslides    LAQ's 2 x 10, 1.5#    Hamstring curls 3 x 10 yellow TB    Seated Marching 2 x 10, 1.5#    Hip ABD    Seated hip rotation  2 x 10 , 1.5#               Patient left in bed with HOB elevated and call button within reach.     GOALS:   Multidisciplinary Problems       Physical Therapy Goals          Problem: Physical Therapy    Goal Priority Disciplines Outcome Goal Variances Interventions   Physical Therapy Goal     PT, PT/OT      Description: Short Term Goals  1. Patient will complete 30 reps of B LE exercises with correct form.   2. Patient will complete sit<>stand transfers with SBA.  3. Patient will ambulate 100 feet with RW on level surfaces with CGA.     Long Term Goals   1. Patient will ambulate 300 feet with RW on level and unlevel surfaces with SBA.  2. Patient will complete all functional transfers with MOD I.  3. Patient will negotiate up and down 2 stairs with use of handrail with SBA.                        Time Tracking:     PT Received On: 05/31/24  PT Start Time: 1215     PT Stop Time: 1240  PT Total Time (min): 25 min     Billable Minutes: Gait 8  Therapeutic exercises 17      Treatment Type: Treatment  PT/PTA: PTA     Number of PTA visits since last PT visit: 1     Continue Plan of Care Per PT order to progress patient toward rehab goals as tolerated by patient.   ROSALVA Alcantara    05/31/2024

## 2024-05-31 NOTE — NURSING
Patient Rounds  Picked up supper tray. Patient getting up to bathroom and back to bed when finished.   by Leia Hamilton, KRUPA at 5/31/24 1804   Patient Rounds  Supper tray delivered/set up. No needs/complaints voiced. No family at bedside.   by Leia Hamilton, RN at 5/31/24 1705   Patient Rounds   by Yoly Alford, Patient Care Assistant at 5/31/24 1652   Patient Rounds  Accu check is 193   by Leia Hamilton, RN at 5/31/24 1621   Patient Rounds  In chair awake. NADN. Refilled ice/water pitcher per request. No other needs/complaints voiced. Spouse at bedside.   by Leia Hamilton RN at 5/31/24 1514   Patient Rounds   by Yoly Alford, Patient Care Assistant at 5/31/24 1434   Patient Rounds  In chair talking with visitors at bedside. NADN. No needs/complaints voiced.   by Leia Hamilton RN at 5/31/24 1306   Patient Rounds   by Yoly Alford, Patient Care Assistant at 5/31/24 1246   Patient Rounds  Lunch tray delivered/set up. Spouse at bedside.   by Leia Hamilton RN at 5/31/24 1105   Patient Rounds  Accu check is 245   by Leia Hamilton RN at 5/31/24 1052   Patient Rounds   by Yoly Alford, Patient Care Assistant at 5/31/24 1041   Patient Rounds  In chair awake. NADN. Routine meds given. Spouse at bedside. No needs/complaints voiced.   by Leia Hamilton RN at 5/31/24 0900   Patient Rounds   by Yoly Alford, Patient Care Assistant at 5/31/24 0810   Patient Rounds  In chair awake. NADN. No c/o pain, dressing noted c/d/i to R hip/leg. No needs/complaints voiced.   by Leia Hamilton, KRUPA at 5/31/24 0710

## 2024-05-31 NOTE — NURSING
During 0100 rounds patient found sitting up in bed reading on his tablet. When patient is asked if he needed anything, patient complained about left small toe pain. Patient stated he needs toenails cut and if nurse could readjust wolfgang hose and socks. Patient states immediate relief. Nurse looked at toes and toenails are extremely long. Skin intact, no redness noted or breakdown.

## 2024-05-31 NOTE — PROGRESS NOTES
[facility]  Hospital Medicine  Progress Note    Patient Name: Milton Johnson  MRN: [id number]  Patient Class: IP- Swing   Admission Date: 5/21/2024  Length of Stay: 10 days  Attending Physician: Valentina Walker,*  Primary Care Provider: Dontrell Shetty MD        Subjective:     Principal Problem:Muscular weakness        HPI:  This 78 yr. Old WM fell at home and fractured his right hip. He underwent an ORIF of said fracture. He is here for therapy after surgery. He has hypertension, diabetes, hyperlipidemia, and hyponatremia. He was found to have a UTI while there. Culture is pending.    Overview/Hospital Course:  5/22/24 - pt. Is doing well so far. Voices no complaint. Will continue present treatment.    5/24/24 - pt. Is doing well. Eating. Now BS's are elevating. Orders revised.    5/28/24 - doing well. No complaints voiced. Will continue present treatment.    5/31/24 - Doing well. No complaints voiced. Will continue present treatment.    Interval History: doing well. Will continue present treatment.    Review of Systems  Objective:     Vital Signs (Most Recent):  Temp: 97.6 °F (36.4 °C) (05/31/24 0709)  Pulse: 77 (05/31/24 0726)  Resp: 18 (05/31/24 0726)  BP: (!) 108/57 (05/31/24 0709)  SpO2: 99 % (05/31/24 0726) Vital Signs (24h Range):  Temp:  [97.4 °F (36.3 °C)-97.6 °F (36.4 °C)] 97.6 °F (36.4 °C)  Pulse:  [76-91] 77  Resp:  [18] 18  SpO2:  [96 %-99 %] 99 %  BP: (107-108)/(57-67) 108/57     Weight: 101.5 kg (223 lb 12.3 oz)  Body mass index is 29.52 kg/m².    Intake/Output Summary (Last 24 hours) at 5/31/2024 0803  Last data filed at 5/30/2024 1141  Gross per 24 hour   Intake 240 ml   Output --   Net 240 ml         Physical Exam        Significant Labs: All pertinent labs within the past 24 hours have been reviewed.    Significant Imaging: I have reviewed all pertinent imaging results/findings within the past 24 hours.    Assessment/Plan:      No notes have been  filed under this hospital service.  Service: Hospital Medicine    VTE Risk Mitigation (From admission, onward)           Ordered     IP VTE LOW RISK PATIENT  Once         05/21/24 1355     Place ESTEFANIA hose  Until discontinued         05/21/24 1355                    Discharge Planning   ARACELI:      Code Status: Full Code   Is the patient medically ready for discharge?:     Reason for patient still in hospital (select all that apply): Patient trending condition  Discharge Plan A: Home with family                  Valentina Walker MD  Department of Hospital Medicine   [facility]

## 2024-05-31 NOTE — PT/OT/SLP PROGRESS
Occupational Therapy   Treatment    Name: Milton Johnson  MRN: [id number]  Admitting Diagnosis:  Muscular weakness       Recommendations:     Discharge Recommendations:    Discharge Equipment Recommendations:  walker, rolling  Barriers to discharge:       Assessment:     Milton Johnson is a 78 y.o. male with a medical diagnosis of Muscular weakness.   Performance deficits affecting function are weakness, impaired endurance, impaired self care skills, impaired functional mobility, impaired balance, decreased lower extremity function, decreased safety awareness.     Rehab Prognosis:  Good; patient would benefit from acute skilled OT services to address these deficits and reach maximum level of function.       Plan:     Patient to be seen 5 x/week to address the above listed problems via self-care/home management, therapeutic exercises, therapeutic activities  Plan of Care Expires:    Plan of Care Reviewed with: patient    Subjective     Chief Complaint: Decreased mobility  Patient/Family Comments/goals: Get stronger  Pain/Comfort:  Pain Rating 1: 0/10    Objective:     Communicated with: RN prior to session.  Patient found up in chair with   upon OT entry to room.    General Precautions: Standard, fall    Orthopedic Precautions:   Braces:    Respiratory Status: Room air     Occupational Performance:     Bed Mobility:    Patient was up in his chair already    Functional Mobility/Transfers:  Patient completed Sit <> Stand Transfer with minimum assistance  with  rolling walker   Functional Mobility: min a with RW    Activities of Daily Living:  Feeding S/u   Grooming S/u   Bathing Min a   UB dsg S/u   LB dsg Min a   Toileting Mod I   Toilet t/f Mod I     Patient was assisted to bathroom with ROLLING WALKER Mod I and completed toileting Mod I      [facility] 6 Click ADL: 22    Treatment & Education:  Pt educated on OT role/POC.   Importance of OOB activity with staff assistance.  Importance of sitting up in the chair  throughout the day as tolerated, especially for meals   Safety during functional t/f and mobility  Importance of assisting with self-care activities     Patient completed the following for increased strength to increase I with ADLs: UBE 8 min x 1x, 5# dowel- shoulder press, chest press, bicep curls x 40, red theraflex x 40 both ways, green theraband- scapular retraction x 40      Patient left up in chair with call button in reach and chair alarm on    GOALS:   Multidisciplinary Problems       Occupational Therapy Goals          Problem: Occupational Therapy    Goal Priority Disciplines Outcome Interventions   Occupational Therapy Goal     OT, PT/OT Progressing    Description: Description: Grooming Status:   Short Term Goal: Pt will perform grooming with s/u sitting EOB.   Long Term Goal: Pt will perform grooming/oral hygiene standing at sink with Mod I      LE dressing Status:   Short Term Goal: Pt will perform LE dressing with mod a.   Long Term Goal: Pt will perform LE dressing with min a.    Toileting Status:   Short Term Goal: Pt will perform toilet hygiene on BSC with min a.  Long Term Goal: Pt will perform toilet hygiene on toilet with no AE with s/u.    Commode Transfer:   Short Term Goal: Pt will perform BSC t/f with min a.  Long Term Goal:  Pt will perform toilet t/f in bathroom with s/u.     Bathing Status:   Long Term Goal: Pt will perform sponge bath with s/u with no unsafe fatigue.     Strength Status:   Long Term Goal: Pt to perform BUE strengthening with weights and/or body weight to increase ADL independence and safety    Endurance Status:   Short Term Goal:pt to perform 15 min OT treatment with 5 or greater rest breaks  Long Term Goal: pt to perform 30 min OT treat with 3 or less rest breaks                       Time Tracking:     OT Date of Treatment: 05/31/24  OT Start Time: 0807  OT Stop Time: 0835  OT Total Time (min): 28 min    Billable Minutes:Self Care/Home Management 8 min  Therapeutic  Exercise 20 min  Jud Kaur, OTR/L      5/31/2024

## 2024-05-31 NOTE — PLAN OF CARE
Problem: Adult Inpatient Plan of Care  Goal: Plan of Care Review  Outcome: Progressing  Flowsheets (Taken 5/30/2024 1915)  Plan of Care Reviewed With: patient     Problem: Adult Inpatient Plan of Care  Goal: Plan of Care Review  Outcome: Progressing  Flowsheets (Taken 5/30/2024 1915)  Plan of Care Reviewed With: patient  Goal: Patient-Specific Goal (Individualized)  Outcome: Progressing  Goal: Absence of Hospital-Acquired Illness or Injury  Outcome: Progressing  Intervention: Prevent Infection  Flowsheets (Taken 5/30/2024 1915)  Infection Prevention:   environmental surveillance performed   equipment surfaces disinfected   hand hygiene promoted   personal protective equipment utilized   rest/sleep promoted   single patient room provided   visitors restricted/screened  Goal: Optimal Comfort and Wellbeing  Outcome: Progressing  Intervention: Provide Person-Centered Care  Flowsheets (Taken 5/30/2024 1915)  Trust Relationship/Rapport:   choices provided   care explained   emotional support provided   empathic listening provided   questions answered   reassurance provided   thoughts/feelings acknowledged   questions encouraged  Goal: Readiness for Transition of Care  Outcome: Progressing  Intervention: Mutually Develop Transition Plan  Flowsheets (Taken 5/30/2024 1915)  Equipment Currently Used at Home: none  Transportation Anticipated: family or friend will provide  Communicated ARACELI with patient/caregiver: Date not available/Unable to determine  Do you expect to return to your current living situation?: Yes  Do you have help at home or someone to help you manage your care at home?: Yes  Readmission within 30 days?: No  Do you currently have service(s) that help you manage your care at home?: No

## 2024-06-01 LAB
GLUCOSE SERPL-MCNC: 107 MG/DL (ref 70–105)
GLUCOSE SERPL-MCNC: 176 MG/DL (ref 70–105)
GLUCOSE SERPL-MCNC: 183 MG/DL (ref 70–105)
GLUCOSE SERPL-MCNC: 194 MG/DL (ref 70–105)

## 2024-06-01 PROCEDURE — 63600175 PHARM REV CODE 636 W HCPCS: Performed by: FAMILY MEDICINE

## 2024-06-01 PROCEDURE — 11000004 HC SNF PRIVATE

## 2024-06-01 PROCEDURE — 25000003 PHARM REV CODE 250: Performed by: FAMILY MEDICINE

## 2024-06-01 PROCEDURE — 25000003 PHARM REV CODE 250: Performed by: EMERGENCY MEDICINE

## 2024-06-01 PROCEDURE — 82962 GLUCOSE BLOOD TEST: CPT

## 2024-06-01 PROCEDURE — 94761 N-INVAS EAR/PLS OXIMETRY MLT: CPT

## 2024-06-01 RX ADMIN — POTASSIUM CHLORIDE 20 MEQ: 1500 TABLET, EXTENDED RELEASE ORAL at 08:06

## 2024-06-01 RX ADMIN — METFORMIN HYDROCHLORIDE 500 MG: 500 TABLET, FILM COATED ORAL at 08:06

## 2024-06-01 RX ADMIN — BIMATOPROST 1 DROP: 0.1 SOLUTION/ DROPS OPHTHALMIC at 08:06

## 2024-06-01 RX ADMIN — PIOGLITAZONE 15 MG: 15 TABLET ORAL at 08:06

## 2024-06-01 RX ADMIN — CARVEDILOL 3.12 MG: 3.12 TABLET, FILM COATED ORAL at 04:06

## 2024-06-01 RX ADMIN — SENNOSIDES AND DOCUSATE SODIUM 1 TABLET: 8.6; 5 TABLET ORAL at 08:06

## 2024-06-01 RX ADMIN — ASPIRIN 81 MG: 81 TABLET, COATED ORAL at 08:06

## 2024-06-01 RX ADMIN — SULFAMETHOXAZOLE AND TRIMETHOPRIM 1 TABLET: 800; 160 TABLET ORAL at 08:06

## 2024-06-01 RX ADMIN — CARVEDILOL 3.12 MG: 3.12 TABLET, FILM COATED ORAL at 08:06

## 2024-06-01 RX ADMIN — INSULIN DETEMIR 30 UNITS: 100 INJECTION, SOLUTION SUBCUTANEOUS at 08:06

## 2024-06-01 RX ADMIN — DORZOLAMIDE HCL 1 DROP: 20 SOLUTION/ DROPS OPHTHALMIC at 08:06

## 2024-06-01 RX ADMIN — LOSARTAN POTASSIUM 100 MG: 100 TABLET, FILM COATED ORAL at 08:06

## 2024-06-01 RX ADMIN — ATORVASTATIN CALCIUM 80 MG: 40 TABLET, FILM COATED ORAL at 08:06

## 2024-06-01 RX ADMIN — METFORMIN HYDROCHLORIDE 500 MG: 500 TABLET, FILM COATED ORAL at 04:06

## 2024-06-01 RX ADMIN — HYDROCHLOROTHIAZIDE 25 MG: 25 TABLET ORAL at 08:06

## 2024-06-01 RX ADMIN — CHOLECALCIFEROL TAB 125 MCG (5000 UNIT) 5000 UNITS: 125 TAB at 08:06

## 2024-06-01 RX ADMIN — AMLODIPINE BESYLATE 10 MG: 10 TABLET ORAL at 08:06

## 2024-06-01 NOTE — PLAN OF CARE
Problem: Adult Inpatient Plan of Care  Goal: Plan of Care Review  Outcome: Progressing  Goal: Patient-Specific Goal (Individualized)  Outcome: Progressing     Problem: Fall Injury Risk  Goal: Absence of Fall and Fall-Related Injury  Outcome: Progressing  Intervention: Identify and Manage Contributors  Flowsheets (Taken 6/1/2024 1720)  Self-Care Promotion:   independence encouraged   BADL personal objects within reach   BADL personal routines maintained  Medication Review/Management:   medications reviewed   high-risk medications identified  Intervention: Promote Injury-Free Environment  Flowsheets (Taken 6/1/2024 1720)  Safety Promotion/Fall Prevention:   assistive device/personal item within reach   diversional activities provided   Fall Risk reviewed with patient/family   Fall Risk signage in place   high risk medications identified   in recliner, wheels locked   medications reviewed   muscle strengthening facilitated   nonskid shoes/socks when out of bed   side rails raised x 3   instructed to call staff for mobility

## 2024-06-01 NOTE — NURSING
Patient Rounds   by Yoly Alford, Patient Care Assistant at 6/1/24 1822   Patient Rounds  Supper tray delivered/set up. Routine meds given. No family at bedside. No needs/complaints voiced.   by Leia Hamilton RN at 6/1/24 1705   Patient Rounds  accu check is 194   by Leia Hamilton RN at 6/1/24 1626   Patient Rounds   by Yoly Alford, Patient Care Assistant at 6/1/24 1604   Patient Rounds  Lying in bed watching TV. NADN. No family at bedside. No needs/complaints voiced.   by Leia Hamilton RN at 6/1/24 1549   Patient Rounds   by Yoly Alford, Patient Care Assistant at 6/1/24 1411   Patient Rounds  In chair talking with visitor at bedside. NADN. No needs/complaints voiced.   by Leia Hamilton RN at 6/1/24 1351   Patient Rounds   by Yoly Alford, Patient Care Assistant at 6/1/24 1247   Patient Rounds  Lunch tray delivered/set up. No family at bedside. No needs/complaints voiced.   by Leia Hamilton RN at 6/1/24 1105   Patient Rounds  Accu check is 183   by Leia Hamilton RN at 6/1/24 1041   Patient Rounds  Ambulated with patient in the hallway   by Leia Hamilton, RN at 6/1/24 1030   Patient Rounds   by Yoly Alford, Patient Care Assistant at 6/1/24 1007   Patient Rounds  In chair awake. NADN. Routine meds gvien, patient refused Miralax. No family at bedside. No needs/complaints voiced.   by Leia Hamilton RN at 6/1/24 0900   Patient Rounds   by Yoly Alford, Patient Care Assistant at 6/1/24 0819   Patient Rounds  In chair awake. NADN. No c/o pain. Dressings noted c/d/i to R hip. No needs/complaints voiced. No family at bedside.   by Leia Hamilton RN at 6/1/24 0726

## 2024-06-01 NOTE — PLAN OF CARE
Problem: Diabetes Comorbidity  Goal: Blood Glucose Level Within Targeted Range  Outcome: Progressing     Problem: Pain Acute  Goal: Optimal Pain Control and Function  Outcome: Progressing     Problem: Infection  Goal: Absence of Infection Signs and Symptoms  Outcome: Progressing     Problem: Fall Injury Risk  Goal: Absence of Fall and Fall-Related Injury  Outcome: Progressing     Problem: VTE (Venous Thromboembolism)  Goal: Tissue Perfusion  Outcome: Progressing     Problem: Constipation  Goal: Effective Bowel Elimination  Outcome: Progressing

## 2024-06-01 NOTE — NURSING
Received pt in bed, watching tv. No complaints voiced. Speech clear, denies pain. Hob up, nonskid socks/wolfgang hose on trace of edema noted to yeison legs/feet. Pt encourage to elevate ble while sitting in chair. Understanding voiced. Bed low, alarm on. Safety measures intact. Cb,urinal,water within reach. Pt instructed to call for assistance.

## 2024-06-02 LAB
GLUCOSE SERPL-MCNC: 188 MG/DL (ref 70–105)
GLUCOSE SERPL-MCNC: 205 MG/DL (ref 70–105)
GLUCOSE SERPL-MCNC: 221 MG/DL (ref 70–105)
GLUCOSE SERPL-MCNC: 224 MG/DL (ref 70–105)

## 2024-06-02 PROCEDURE — 63600175 PHARM REV CODE 636 W HCPCS: Performed by: FAMILY MEDICINE

## 2024-06-02 PROCEDURE — 82962 GLUCOSE BLOOD TEST: CPT

## 2024-06-02 PROCEDURE — 25000003 PHARM REV CODE 250: Performed by: FAMILY MEDICINE

## 2024-06-02 PROCEDURE — 94761 N-INVAS EAR/PLS OXIMETRY MLT: CPT

## 2024-06-02 PROCEDURE — 25000003 PHARM REV CODE 250: Performed by: EMERGENCY MEDICINE

## 2024-06-02 PROCEDURE — 11000004 HC SNF PRIVATE

## 2024-06-02 RX ADMIN — AMLODIPINE BESYLATE 10 MG: 10 TABLET ORAL at 08:06

## 2024-06-02 RX ADMIN — HYDROCHLOROTHIAZIDE 25 MG: 25 TABLET ORAL at 08:06

## 2024-06-02 RX ADMIN — LOSARTAN POTASSIUM 100 MG: 100 TABLET, FILM COATED ORAL at 08:06

## 2024-06-02 RX ADMIN — BIMATOPROST 1 DROP: 0.1 SOLUTION/ DROPS OPHTHALMIC at 08:06

## 2024-06-02 RX ADMIN — CARVEDILOL 3.12 MG: 3.12 TABLET, FILM COATED ORAL at 05:06

## 2024-06-02 RX ADMIN — ATORVASTATIN CALCIUM 80 MG: 40 TABLET, FILM COATED ORAL at 08:06

## 2024-06-02 RX ADMIN — SULFAMETHOXAZOLE AND TRIMETHOPRIM 1 TABLET: 800; 160 TABLET ORAL at 08:06

## 2024-06-02 RX ADMIN — METFORMIN HYDROCHLORIDE 500 MG: 500 TABLET, FILM COATED ORAL at 08:06

## 2024-06-02 RX ADMIN — SENNOSIDES AND DOCUSATE SODIUM 1 TABLET: 8.6; 5 TABLET ORAL at 08:06

## 2024-06-02 RX ADMIN — DORZOLAMIDE HCL 1 DROP: 20 SOLUTION/ DROPS OPHTHALMIC at 08:06

## 2024-06-02 RX ADMIN — PIOGLITAZONE 15 MG: 15 TABLET ORAL at 08:06

## 2024-06-02 RX ADMIN — ASPIRIN 81 MG: 81 TABLET, COATED ORAL at 08:06

## 2024-06-02 RX ADMIN — CARVEDILOL 3.12 MG: 3.12 TABLET, FILM COATED ORAL at 08:06

## 2024-06-02 RX ADMIN — METFORMIN HYDROCHLORIDE 500 MG: 500 TABLET, FILM COATED ORAL at 05:06

## 2024-06-02 RX ADMIN — POTASSIUM CHLORIDE 20 MEQ: 1500 TABLET, EXTENDED RELEASE ORAL at 08:06

## 2024-06-02 RX ADMIN — INSULIN DETEMIR 30 UNITS: 100 INJECTION, SOLUTION SUBCUTANEOUS at 08:06

## 2024-06-02 RX ADMIN — CHOLECALCIFEROL TAB 125 MCG (5000 UNIT) 5000 UNITS: 125 TAB at 08:06

## 2024-06-02 NOTE — NURSING
Received pt in bed, watching tv, glasses on for vision.  Speech clear, aox4. No complaints voiced, denies pain and discomfort. States lbm was 6/1/2024. Dsgs to right hip x3 are dry and intact, small amt of edema /busies noted. Pt denies tenderness when touched, no signs of infection noted. Edema noted to ble ,Yonas hose applied , nonskid socks on. Ble elevated on pillows. Bed low, alarm on. Safety measures intact, water given as requested. Cb, urinal, phone/tablet within reach. Pt encourage to call for assistance.

## 2024-06-02 NOTE — PLAN OF CARE
Problem: Pain Acute  Goal: Optimal Pain Control and Function  Outcome: Progressing     Problem: Fall Injury Risk  Goal: Absence of Fall and Fall-Related Injury  Outcome: Progressing     Problem: Infection  Goal: Absence of Infection Signs and Symptoms  Outcome: Progressing     Problem: VTE (Venous Thromboembolism)  Goal: Tissue Perfusion  Outcome: Progressing

## 2024-06-02 NOTE — PLAN OF CARE
Problem: Adult Inpatient Plan of Care  Goal: Plan of Care Review  Outcome: Progressing  Goal: Patient-Specific Goal (Individualized)  Outcome: Progressing     Problem: Fall Injury Risk  Goal: Absence of Fall and Fall-Related Injury  Outcome: Progressing  Intervention: Identify and Manage Contributors  Flowsheets (Taken 6/2/2024 1717)  Self-Care Promotion:   independence encouraged   BADL personal objects within reach   BADL personal routines maintained  Medication Review/Management:   medications reviewed   high-risk medications identified  Intervention: Promote Injury-Free Environment  Flowsheets (Taken 6/2/2024 1717)  Safety Promotion/Fall Prevention:   assistive device/personal item within reach   bed alarm refused   diversional activities provided   Fall Risk reviewed with patient/family   high risk medications identified   in recliner, wheels locked   medications reviewed   muscle strengthening facilitated   nonskid shoes/socks when out of bed   side rails raised x 3   instructed to call staff for mobility

## 2024-06-03 LAB
ANION GAP SERPL CALCULATED.3IONS-SCNC: 15 MMOL/L (ref 7–16)
BASOPHILS # BLD AUTO: 0.08 K/UL (ref 0–0.2)
BASOPHILS NFR BLD AUTO: 0.6 % (ref 0–1)
BUN SERPL-MCNC: 39 MG/DL (ref 7–18)
BUN/CREAT SERPL: 23 (ref 6–20)
CALCIUM SERPL-MCNC: 8.7 MG/DL (ref 8.5–10.1)
CHLORIDE SERPL-SCNC: 92 MMOL/L (ref 98–107)
CO2 SERPL-SCNC: 20 MMOL/L (ref 21–32)
CREAT SERPL-MCNC: 1.69 MG/DL (ref 0.7–1.3)
DIFFERENTIAL METHOD BLD: ABNORMAL
EGFR (NO RACE VARIABLE) (RUSH/TITUS): 41 ML/MIN/1.73M2
EOSINOPHIL # BLD AUTO: 0.09 K/UL (ref 0–0.5)
EOSINOPHIL NFR BLD AUTO: 0.7 % (ref 1–4)
ERYTHROCYTE [DISTWIDTH] IN BLOOD BY AUTOMATED COUNT: 13.6 % (ref 11.5–14.5)
GLUCOSE SERPL-MCNC: 109 MG/DL (ref 74–106)
GLUCOSE SERPL-MCNC: 169 MG/DL (ref 70–105)
GLUCOSE SERPL-MCNC: 190 MG/DL (ref 70–105)
GLUCOSE SERPL-MCNC: 193 MG/DL (ref 70–105)
HCT VFR BLD AUTO: 31.7 % (ref 40–54)
HGB BLD-MCNC: 10.9 G/DL (ref 13.5–18)
IMM GRANULOCYTES # BLD AUTO: 0.15 K/UL (ref 0–0.04)
IMM GRANULOCYTES NFR BLD: 1.2 % (ref 0–0.4)
LYMPHOCYTES # BLD AUTO: 0.39 K/UL (ref 1–4.8)
LYMPHOCYTES NFR BLD AUTO: 3.1 % (ref 27–41)
MCH RBC QN AUTO: 29.6 PG (ref 27–31)
MCHC RBC AUTO-ENTMCNC: 34.4 G/DL (ref 32–36)
MCV RBC AUTO: 86.1 FL (ref 80–96)
MONOCYTES # BLD AUTO: 0.68 K/UL (ref 0–0.8)
MONOCYTES NFR BLD AUTO: 5.4 % (ref 2–6)
MPC BLD CALC-MCNC: 8.9 FL (ref 9.4–12.4)
NEUTROPHILS # BLD AUTO: 11.27 K/UL (ref 1.8–7.7)
NEUTROPHILS NFR BLD AUTO: 89 % (ref 53–65)
NRBC # BLD AUTO: 0 X10E3/UL
NRBC, AUTO (.00): 0 %
PLATELET # BLD AUTO: 261 K/UL (ref 150–400)
POTASSIUM SERPL-SCNC: 4 MMOL/L (ref 3.5–5.1)
RBC # BLD AUTO: 3.68 M/UL (ref 4.6–6.2)
SODIUM SERPL-SCNC: 123 MMOL/L (ref 136–145)
WBC # BLD AUTO: 12.66 K/UL (ref 4.5–11)

## 2024-06-03 PROCEDURE — 99308 SBSQ NF CARE LOW MDM 20: CPT | Mod: ,,, | Performed by: FAMILY MEDICINE

## 2024-06-03 PROCEDURE — 25000003 PHARM REV CODE 250: Performed by: EMERGENCY MEDICINE

## 2024-06-03 PROCEDURE — 97110 THERAPEUTIC EXERCISES: CPT | Mod: CQ

## 2024-06-03 PROCEDURE — 80048 BASIC METABOLIC PNL TOTAL CA: CPT | Performed by: FAMILY MEDICINE

## 2024-06-03 PROCEDURE — 11000004 HC SNF PRIVATE

## 2024-06-03 PROCEDURE — 36415 COLL VENOUS BLD VENIPUNCTURE: CPT | Performed by: FAMILY MEDICINE

## 2024-06-03 PROCEDURE — 94761 N-INVAS EAR/PLS OXIMETRY MLT: CPT

## 2024-06-03 PROCEDURE — 25000003 PHARM REV CODE 250: Performed by: FAMILY MEDICINE

## 2024-06-03 PROCEDURE — 97110 THERAPEUTIC EXERCISES: CPT

## 2024-06-03 PROCEDURE — 85025 COMPLETE CBC W/AUTO DIFF WBC: CPT | Performed by: FAMILY MEDICINE

## 2024-06-03 PROCEDURE — 97116 GAIT TRAINING THERAPY: CPT | Mod: CQ

## 2024-06-03 PROCEDURE — S0179 MEGESTROL 20 MG: HCPCS | Performed by: FAMILY MEDICINE

## 2024-06-03 PROCEDURE — 82962 GLUCOSE BLOOD TEST: CPT

## 2024-06-03 PROCEDURE — 63600175 PHARM REV CODE 636 W HCPCS: Performed by: FAMILY MEDICINE

## 2024-06-03 RX ORDER — MEGESTROL ACETATE 40 MG/ML
400 SUSPENSION ORAL DAILY
Status: DISCONTINUED | OUTPATIENT
Start: 2024-06-03 | End: 2024-06-07 | Stop reason: HOSPADM

## 2024-06-03 RX ADMIN — POTASSIUM CHLORIDE 20 MEQ: 1500 TABLET, EXTENDED RELEASE ORAL at 08:06

## 2024-06-03 RX ADMIN — LOSARTAN POTASSIUM 100 MG: 100 TABLET, FILM COATED ORAL at 08:06

## 2024-06-03 RX ADMIN — AMLODIPINE BESYLATE 10 MG: 10 TABLET ORAL at 08:06

## 2024-06-03 RX ADMIN — BIMATOPROST 1 DROP: 0.1 SOLUTION/ DROPS OPHTHALMIC at 08:06

## 2024-06-03 RX ADMIN — CARVEDILOL 3.12 MG: 3.12 TABLET, FILM COATED ORAL at 05:06

## 2024-06-03 RX ADMIN — ASPIRIN 81 MG: 81 TABLET, COATED ORAL at 08:06

## 2024-06-03 RX ADMIN — DORZOLAMIDE HCL 1 DROP: 20 SOLUTION/ DROPS OPHTHALMIC at 08:06

## 2024-06-03 RX ADMIN — HYDROCHLOROTHIAZIDE 25 MG: 25 TABLET ORAL at 08:06

## 2024-06-03 RX ADMIN — INSULIN DETEMIR 30 UNITS: 100 INJECTION, SOLUTION SUBCUTANEOUS at 08:06

## 2024-06-03 RX ADMIN — PIOGLITAZONE 15 MG: 15 TABLET ORAL at 08:06

## 2024-06-03 RX ADMIN — CARVEDILOL 3.12 MG: 3.12 TABLET, FILM COATED ORAL at 08:06

## 2024-06-03 RX ADMIN — METFORMIN HYDROCHLORIDE 500 MG: 500 TABLET, FILM COATED ORAL at 05:06

## 2024-06-03 RX ADMIN — SENNOSIDES AND DOCUSATE SODIUM 1 TABLET: 8.6; 5 TABLET ORAL at 08:06

## 2024-06-03 RX ADMIN — ATORVASTATIN CALCIUM 80 MG: 40 TABLET, FILM COATED ORAL at 08:06

## 2024-06-03 RX ADMIN — SULFAMETHOXAZOLE AND TRIMETHOPRIM 1 TABLET: 800; 160 TABLET ORAL at 08:06

## 2024-06-03 RX ADMIN — MEGESTROL ACETATE 400 MG: 400 SUSPENSION ORAL at 08:06

## 2024-06-03 RX ADMIN — METFORMIN HYDROCHLORIDE 500 MG: 500 TABLET, FILM COATED ORAL at 08:06

## 2024-06-03 RX ADMIN — OXYCODONE HYDROCHLORIDE AND ACETAMINOPHEN 1 TABLET: 10; 325 TABLET ORAL at 03:06

## 2024-06-03 RX ADMIN — CHOLECALCIFEROL TAB 125 MCG (5000 UNIT) 5000 UNITS: 125 TAB at 08:06

## 2024-06-03 NOTE — PT/OT/SLP PROGRESS
Occupational Therapy   Treatment    Name: Milton Johnson  MRN: [id number]  Admitting Diagnosis:  Muscular weakness       Recommendations:     Discharge Recommendations:    Discharge Equipment Recommendations:  walker, rolling  Barriers to discharge:       Assessment:     Milton Johnson is a 78 y.o. male with a medical diagnosis of Muscular weakness.   Performance deficits affecting function are weakness, impaired endurance, impaired self care skills, impaired functional mobility, impaired balance, decreased lower extremity function, decreased safety awareness.     Rehab Prognosis:  Good; patient would benefit from acute skilled OT services to address these deficits and reach maximum level of function.       Plan:     Patient to be seen 5 x/week to address the above listed problems via therapeutic exercises, therapeutic activities, self-care/home management  Plan of Care Expires:    Plan of Care Reviewed with: patient    Subjective     Chief Complaint: Decreased mobility  Patient/Family Comments/goals: Get stronger  Pain/Comfort:  Pain Rating 1: 0/10    Objective:     Communicated with: RN prior to session.  Patient found up in chair with   upon OT entry to room.    General Precautions: Standard, fall    Orthopedic Precautions:   Braces:    Respiratory Status: Room air     Occupational Performance:     Bed Mobility:    Patient was up in his chair already    Functional Mobility/Transfers:  Patient completed Sit <> Stand Transfer with minimum assistance  with  rolling walker   Functional Mobility: min a with RW    Activities of Daily Living:  Feeding S/u   Grooming S/u   Bathing Min a   UB dsg S/u   LB dsg Min a   Toileting Mod I   Toilet t/f Mod I     Patient was assisted to bathroom with ROLLING WALKER Mod I and completed toileting Mod I      [facility] 6 Click ADL: 22    Treatment & Education:  Pt educated on OT role/POC.   Importance of OOB activity with staff assistance.  Importance of sitting up in the chair  throughout the day as tolerated, especially for meals   Safety during functional t/f and mobility  Importance of assisting with self-care activities     Patient completed the following for increased strength to increase I with ADLs: UBE 8 min x 1x, 5# dowel- shoulder press, chest press, bicep curls x 40, red theraflex x 40 both ways, green theraband- scapular retraction x 40      Patient left up in chair with call button in reach and chair alarm on    GOALS:   Multidisciplinary Problems       Occupational Therapy Goals          Problem: Occupational Therapy    Goal Priority Disciplines Outcome Interventions   Occupational Therapy Goal     OT, PT/OT Progressing    Description: Description: Grooming Status:   Short Term Goal: Pt will perform grooming with s/u sitting EOB.   Long Term Goal: Pt will perform grooming/oral hygiene standing at sink with Mod I      LE dressing Status:   Short Term Goal: Pt will perform LE dressing with mod a.   Long Term Goal: Pt will perform LE dressing with min a.    Toileting Status:   Short Term Goal: Pt will perform toilet hygiene on BSC with min a.  Long Term Goal: Pt will perform toilet hygiene on toilet with no AE with s/u.    Commode Transfer:   Short Term Goal: Pt will perform BSC t/f with min a.  Long Term Goal:  Pt will perform toilet t/f in bathroom with s/u.     Bathing Status:   Long Term Goal: Pt will perform sponge bath with s/u with no unsafe fatigue.     Strength Status:   Long Term Goal: Pt to perform BUE strengthening with weights and/or body weight to increase ADL independence and safety    Endurance Status:   Short Term Goal:pt to perform 15 min OT treatment with 5 or greater rest breaks  Long Term Goal: pt to perform 30 min OT treat with 3 or less rest breaks                       Time Tracking:     OT Date of Treatment: 06/03/24  OT Start Time: 0924  OT Stop Time: 0954  OT Total Time (min): 30 min    Billable Minutes:Therapeutic Exercise 30 min  Jud Kaur  OTR/L      6/3/2024

## 2024-06-03 NOTE — PLAN OF CARE
Problem: Diabetes Comorbidity  Goal: Blood Glucose Level Within Targeted Range  Outcome: Progressing     Problem: Pain Acute  Goal: Optimal Pain Control and Function  Outcome: Progressing     Problem: Fall Injury Risk  Goal: Absence of Fall and Fall-Related Injury  Outcome: Progressing     Problem: Infection  Goal: Absence of Infection Signs and Symptoms  Outcome: Progressing     Problem: VTE (Venous Thromboembolism)  Goal: Tissue Perfusion  Outcome: Progressing

## 2024-06-03 NOTE — PLAN OF CARE
Spoke with patient and pt's wife about next review date being on 6/4/24. Discussed home health PT vs outpt. PT. Pt will notify CM when he makes a decision.

## 2024-06-03 NOTE — SUBJECTIVE & OBJECTIVE
Interval History: not eating. Orders revised.    Review of Systems  Objective:     Vital Signs (Most Recent):  Temp: 98.7 °F (37.1 °C) (06/03/24 0759)  Pulse: 86 (06/03/24 0759)  Resp: 18 (06/03/24 0759)  BP: (!) 102/54 (06/03/24 0759)  SpO2: 98 % (06/03/24 0759) Vital Signs (24h Range):  Temp:  [98.7 °F (37.1 °C)-99.7 °F (37.6 °C)] 98.7 °F (37.1 °C)  Pulse:  [] 86  Resp:  [18-20] 18  SpO2:  [94 %-98 %] 98 %  BP: (102-126)/(54-70) 102/54     Weight: 100.2 kg (220 lb 14.4 oz)  Body mass index is 29.14 kg/m².    Intake/Output Summary (Last 24 hours) at 6/3/2024 0811  Last data filed at 6/3/2024 0104  Gross per 24 hour   Intake 240 ml   Output 300 ml   Net -60 ml         Physical Exam        Significant Labs: All pertinent labs within the past 24 hours have been reviewed.    Significant Imaging: I have reviewed all pertinent imaging results/findings within the past 24 hours.

## 2024-06-03 NOTE — PROGRESS NOTES
[facility]  Hospital Medicine  Progress Note    Patient Name: Milton Johnson  MRN: [id number]  Patient Class: IP- Swing   Admission Date: 5/21/2024  Length of Stay: 13 days  Attending Physician: Vaelntina Walker,*  Primary Care Provider: Dontrell Shetty MD        Subjective:     Principal Problem:Muscular weakness        HPI:  This 78 yr. Old WM fell at home and fractured his right hip. He underwent an ORIF of said fracture. He is here for therapy after surgery. He has hypertension, diabetes, hyperlipidemia, and hyponatremia. He was found to have a UTI while there. Culture is pending.    Overview/Hospital Course:  5/22/24 - pt. Is doing well so far. Voices no complaint. Will continue present treatment.    5/24/24 - pt. Is doing well. Eating. Now BS's are elevating. Orders revised.    5/28/24 - doing well. No complaints voiced. Will continue present treatment.    5/31/24 - Doing well. No complaints voiced. Will continue present treatment.    6/3/24 - not eating. Pt. Requesting something for appetite. Orders revised.    Interval History: not eating. Orders revised.    Review of Systems  Objective:     Vital Signs (Most Recent):  Temp: 98.7 °F (37.1 °C) (06/03/24 0759)  Pulse: 86 (06/03/24 0759)  Resp: 18 (06/03/24 0759)  BP: (!) 102/54 (06/03/24 0759)  SpO2: 98 % (06/03/24 0759) Vital Signs (24h Range):  Temp:  [98.7 °F (37.1 °C)-99.7 °F (37.6 °C)] 98.7 °F (37.1 °C)  Pulse:  [] 86  Resp:  [18-20] 18  SpO2:  [94 %-98 %] 98 %  BP: (102-126)/(54-70) 102/54     Weight: 100.2 kg (220 lb 14.4 oz)  Body mass index is 29.14 kg/m².    Intake/Output Summary (Last 24 hours) at 6/3/2024 0811  Last data filed at 6/3/2024 0104  Gross per 24 hour   Intake 240 ml   Output 300 ml   Net -60 ml         Physical Exam        Significant Labs: All pertinent labs within the past 24 hours have been reviewed.    Significant Imaging: I have reviewed all pertinent imaging results/findings  within the past 24 hours.    Assessment/Plan:      No notes have been filed under this hospital service.  Service: Hospital Medicine    VTE Risk Mitigation (From admission, onward)           Ordered     IP VTE LOW RISK PATIENT  Once         05/21/24 1355     Place ESTEFANIA hose  Until discontinued         05/21/24 1355                    Discharge Planning   ARACELI:      Code Status: Full Code   Is the patient medically ready for discharge?:     Reason for patient still in hospital (select all that apply): Patient trending condition  Discharge Plan A: Home with family                  Valentina Walker MD  Department of Hospital Medicine   [facility]

## 2024-06-03 NOTE — NURSING
Received pt in bed with glasses on watching tv. Alert and orient , speech clear. Denies pain and discomfort. No complaints voiced. States he had a good day, pt reports lbm was today. Bed low, alarm on, walker near bed for ambulation. Pt encourage to call for assistance with ambulation due to report of ambulating without assistance. pt educated on the risk of falling and reinjuring his hip, understancinf vocied. and pt states will call for assistance if needed. Dsging to right hip d/I x3, change due 6/4/2024, healing bruises and edema noted to area, +1 edema noted to BLE, wolfgang hose and nonskid socks reapplied as ordered. Cb,phone,tablet,water, urinal all within reach. Safety measures intact. Room free of clutter.

## 2024-06-03 NOTE — PT/OT/SLP PROGRESS
Physical Therapy Treatment    Patient Name:  Milton Johnson   MRN:  [id number]    Recommendations:     Discharge Recommendations: Low Intensity Therapy  Discharge Equipment Recommendations: walker, rolling  Barriers to discharge: None    Assessment:     Milton Johnson is a 78 y.o. male admitted with a medical diagnosis of Muscular weakness.  He presents with the following impairments/functional limitations: weakness, impaired endurance, impaired self care skills, impaired functional mobility, gait instability, decreased upper extremity function, decreased lower extremity function.  Pt required occasional verbal and visual cues to complete exercises with proper alignment, speed of movement, posture, and count.  Pt displays improved gait mechanics and walker navigation during gait training.  Pt completes tx with improved strength and endurance displayed throughout tx. No adverse effects noted or reported.       Rehab Prognosis: Good; patient would benefit from acute skilled PT services to address these deficits and reach maximum level of function.    Recent Surgery: * No surgery found *      Plan:     During this hospitalization, patient to be seen 5 x/week to address the identified rehab impairments via gait training, therapeutic activities, therapeutic exercises and progress toward the following goals:    Plan of Care Expires:  06/11/24    Subjective     Chief Complaint: Decreased mobility   Patient/Family Comments/goals: Get stronger   Pain/Comfort:  Pain Rating 1: 0/10      Objective:     Communicated with skilled nursing prior to session.  Patient found alert with HOB elevated in his room.     General Precautions: Standard, fall  Orthopedic Precautions: RLE weight bearing as tolerated  Braces: N/A  Respiratory Status: Room air     Functional Mobility:  Transfers:     Sit to Stand:  contact guard assistance with rolling walker  Gait: Approx. 150' x 1  with RW and CGA  x 1 on level indoor surface with verbal and  visual cues for proper AD/LE sequencing.        AM-PAC 6 CLICK MOBILITY  Turning over in bed (including adjusting bedclothes, sheets and blankets)?: 3  Sitting down on and standing up from a chair with arms (e.g., wheelchair, bedside commode, etc.): 3  Moving from lying on back to sitting on the side of the bed?: 3  Moving to and from a bed to a chair (including a wheelchair)?: 3  Need to walk in hospital room?: 3  Climbing 3-5 steps with a railing?: 3  Basic Mobility Total Score: 18       Ther-Ex Reps       Ankle pumps    Quad sets    Horizontal Hip Abduction/Hip ADD    Hip ADD 3 x 10   Heelslides    LAQ's 2 x 10, 1.5#    Hamstring curls 3 x 10 yellow TB    Seated Marching 2 x 10, 1.5#    Hip ABD 2 x 10 yellow TB   Seated hip rotation  2 x 10 , 1.5#               Patient left in restroom with nursing notified and call button within reach.     GOALS:   Multidisciplinary Problems       Physical Therapy Goals          Problem: Physical Therapy    Goal Priority Disciplines Outcome Goal Variances Interventions   Physical Therapy Goal     PT, PT/OT      Description: Short Term Goals  1. Patient will complete 30 reps of B LE exercises with correct form.   2. Patient will complete sit<>stand transfers with SBA.  3. Patient will ambulate 100 feet with RW on level surfaces with CGA.     Long Term Goals   1. Patient will ambulate 300 feet with RW on level and unlevel surfaces with SBA.  2. Patient will complete all functional transfers with MOD I.  3. Patient will negotiate up and down 2 stairs with use of handrail with SBA.                        Time Tracking:     PT Received On: 06/03/24  PT Start Time: 0810     PT Stop Time: 0840  PT Total Time (min): 30 min     Billable Minutes: Gait 8  Therapeutic exercises 22      Treatment Type: Treatment  PT/PTA: PTA     Number of PTA visits since last PT visit: 2     Continue Plan of Care Per PT order to progress patient toward rehab goals as tolerated by patient.   Ankur Moore,  LPTA   06/03/2024

## 2024-06-04 LAB
GLUCOSE SERPL-MCNC: 133 MG/DL (ref 70–105)
GLUCOSE SERPL-MCNC: 145 MG/DL (ref 70–105)
GLUCOSE SERPL-MCNC: 162 MG/DL (ref 70–105)
GLUCOSE SERPL-MCNC: 98 MG/DL (ref 70–105)

## 2024-06-04 PROCEDURE — 97110 THERAPEUTIC EXERCISES: CPT | Mod: CQ

## 2024-06-04 PROCEDURE — 82962 GLUCOSE BLOOD TEST: CPT

## 2024-06-04 PROCEDURE — 94761 N-INVAS EAR/PLS OXIMETRY MLT: CPT

## 2024-06-04 PROCEDURE — 11000004 HC SNF PRIVATE

## 2024-06-04 PROCEDURE — 25000003 PHARM REV CODE 250: Performed by: FAMILY MEDICINE

## 2024-06-04 PROCEDURE — 63600175 PHARM REV CODE 636 W HCPCS: Performed by: FAMILY MEDICINE

## 2024-06-04 PROCEDURE — 97116 GAIT TRAINING THERAPY: CPT | Mod: CQ

## 2024-06-04 PROCEDURE — 97110 THERAPEUTIC EXERCISES: CPT

## 2024-06-04 PROCEDURE — S0179 MEGESTROL 20 MG: HCPCS | Performed by: FAMILY MEDICINE

## 2024-06-04 RX ADMIN — CHOLECALCIFEROL TAB 125 MCG (5000 UNIT) 5000 UNITS: 125 TAB at 09:06

## 2024-06-04 RX ADMIN — METFORMIN HYDROCHLORIDE 500 MG: 500 TABLET, FILM COATED ORAL at 04:06

## 2024-06-04 RX ADMIN — BIMATOPROST 1 DROP: 0.1 SOLUTION/ DROPS OPHTHALMIC at 08:06

## 2024-06-04 RX ADMIN — POTASSIUM CHLORIDE 20 MEQ: 1500 TABLET, EXTENDED RELEASE ORAL at 09:06

## 2024-06-04 RX ADMIN — DORZOLAMIDE HCL 1 DROP: 20 SOLUTION/ DROPS OPHTHALMIC at 08:06

## 2024-06-04 RX ADMIN — ASPIRIN 81 MG: 81 TABLET, COATED ORAL at 08:06

## 2024-06-04 RX ADMIN — POTASSIUM CHLORIDE 20 MEQ: 1500 TABLET, EXTENDED RELEASE ORAL at 08:06

## 2024-06-04 RX ADMIN — PIOGLITAZONE 15 MG: 15 TABLET ORAL at 09:06

## 2024-06-04 RX ADMIN — ASPIRIN 81 MG: 81 TABLET, COATED ORAL at 09:06

## 2024-06-04 RX ADMIN — OXYCODONE HYDROCHLORIDE AND ACETAMINOPHEN 1 TABLET: 10; 325 TABLET ORAL at 03:06

## 2024-06-04 RX ADMIN — INSULIN DETEMIR 30 UNITS: 100 INJECTION, SOLUTION SUBCUTANEOUS at 08:06

## 2024-06-04 RX ADMIN — DORZOLAMIDE HCL 1 DROP: 20 SOLUTION/ DROPS OPHTHALMIC at 09:06

## 2024-06-04 RX ADMIN — CARVEDILOL 3.12 MG: 3.12 TABLET, FILM COATED ORAL at 04:06

## 2024-06-04 RX ADMIN — METFORMIN HYDROCHLORIDE 500 MG: 500 TABLET, FILM COATED ORAL at 09:06

## 2024-06-04 RX ADMIN — ATORVASTATIN CALCIUM 80 MG: 40 TABLET, FILM COATED ORAL at 08:06

## 2024-06-04 RX ADMIN — MEGESTROL ACETATE 400 MG: 400 SUSPENSION ORAL at 09:06

## 2024-06-04 NOTE — PLAN OF CARE
Problem: Adult Inpatient Plan of Care  Goal: Plan of Care Review  6/4/2024 0515 by Mable Wheat RN  Outcome: Progressing  6/3/2024 2010 by Mable Wheat RN  Outcome: Progressing  Goal: Patient-Specific Goal (Individualized)  6/4/2024 0515 by Mable Wheat RN  Outcome: Progressing  6/3/2024 2010 by Mable Wheat RN  Outcome: Progressing  Goal: Absence of Hospital-Acquired Illness or Injury  6/4/2024 0515 by Mable Wheat RN  Outcome: Progressing  6/3/2024 2010 by Mable Wheat RN  Outcome: Progressing  Goal: Optimal Comfort and Wellbeing  6/4/2024 0515 by Mable Wheat RN  Outcome: Progressing  6/3/2024 2010 by Mable Wheat RN  Outcome: Progressing  Goal: Readiness for Transition of Care  6/4/2024 0515 by Mable Wheat RN  Outcome: Progressing  6/3/2024 2010 by Mable Wheat RN  Outcome: Progressing     Problem: Diabetes Comorbidity  Goal: Blood Glucose Level Within Targeted Range  6/4/2024 0515 by Mable Wheat RN  Outcome: Progressing  6/3/2024 2010 by Mable Wheat RN  Outcome: Progressing     Problem: Pain Acute  Goal: Optimal Pain Control and Function  6/4/2024 0515 by Mable Wheat RN  Outcome: Progressing  6/3/2024 2010 by Mable Wheat RN  Outcome: Progressing     Problem: Fall Injury Risk  Goal: Absence of Fall and Fall-Related Injury  6/4/2024 0515 by Mable Wheat RN  Outcome: Progressing  6/3/2024 2010 by Mable Wheat RN  Outcome: Progressing     Problem: Infection  Goal: Absence of Infection Signs and Symptoms  6/4/2024 0515 by Mable Wheat RN  Outcome: Progressing  6/3/2024 2010 by Mable Wheat RN  Outcome: Progressing     Problem: Wound  Goal: Optimal Coping  6/4/2024 0515 by Mable Wheat RN  Outcome: Progressing  6/3/2024 2010 by Mable Wheat RN  Outcome: Progressing  Goal: Optimal Functional Ability  6/4/2024 0515 by Mable Wheat RN  Outcome: Progressing  6/3/2024 2010 by Jarret, Mable, RN  Outcome: Progressing  Goal: Absence of Infection Signs and  Symptoms  6/4/2024 0515 by Mable Wheat RN  Outcome: Progressing  6/3/2024 2010 by Mable Wheat RN  Outcome: Progressing  Goal: Improved Oral Intake  6/4/2024 0515 by Mable Wheat RN  Outcome: Progressing  6/3/2024 2010 by Mable Wheat RN  Outcome: Progressing  Goal: Optimal Pain Control and Function  6/4/2024 0515 by Mable Wheat RN  Outcome: Progressing  6/3/2024 2010 by Mable Wheat RN  Outcome: Progressing  Goal: Skin Health and Integrity  6/4/2024 0515 by Mable Wheat RN  Outcome: Progressing  6/3/2024 2010 by Mable Wheat RN  Outcome: Progressing  Goal: Optimal Wound Healing  6/4/2024 0515 by Mable Wheat RN  Outcome: Progressing  6/3/2024 2010 by Mable Wheat RN  Outcome: Progressing     Problem: VTE (Venous Thromboembolism)  Goal: Tissue Perfusion  6/4/2024 0515 by Mable Wheat RN  Outcome: Progressing  6/3/2024 2010 by Mable Wheat RN  Outcome: Progressing     Problem: Constipation  Goal: Effective Bowel Elimination  6/4/2024 0515 by Mable Wheat RN  Outcome: Progressing  6/3/2024 2010 by Mable Wheat RN  Outcome: Progressing

## 2024-06-04 NOTE — PT/OT/SLP PROGRESS
Occupational Therapy   Treatment    Name: Milton Johnson  MRN: [id number]  Admitting Diagnosis:  Muscular weakness       Recommendations:     Discharge Recommendations:    Discharge Equipment Recommendations:  walker, rolling  Barriers to discharge:       Assessment:     Milton Johnson is a 78 y.o. male with a medical diagnosis of Muscular weakness.   Performance deficits affecting function are weakness, impaired endurance, impaired self care skills, impaired functional mobility, impaired balance, decreased lower extremity function, decreased safety awareness.     Rehab Prognosis:  Good; patient would benefit from acute skilled OT services to address these deficits and reach maximum level of function.       Plan:     Patient to be seen 5 x/week to address the above listed problems via therapeutic exercises, therapeutic activities, self-care/home management  Plan of Care Expires:    Plan of Care Reviewed with: patient    Subjective     Chief Complaint: Decreased mobility  Patient/Family Comments/goals: Get stronger  Pain/Comfort:  Pain Rating 1: 0/10    Objective:     Communicated with: RN prior to session.  Patient found up in chair with   upon OT entry to room.    General Precautions: Standard, fall    Orthopedic Precautions:   Braces:    Respiratory Status: Room air     Occupational Performance:     Bed Mobility:    Patient was up in his chair already    Functional Mobility/Transfers:  Patient completed Sit <> Stand Transfer with minimum assistance  with  rolling walker   Functional Mobility: min a with RW    Activities of Daily Living:  Feeding S/u   Grooming S/u   Bathing Min a   UB dsg S/u   LB dsg Min a   Toileting Mod I   Toilet t/f Mod I     Patient was assisted to bathroom with ROLLING WALKER Mod I and completed toileting Mod I      [facility] 6 Click ADL: 22    Treatment & Education:  Pt educated on OT role/POC.   Importance of OOB activity with staff assistance.  Importance of sitting up in the chair  throughout the day as tolerated, especially for meals   Safety during functional t/f and mobility  Importance of assisting with self-care activities     Patient completed the following for increased strength to increase I with ADLs: UBE 8 min x 1x, 5# dowel- shoulder press, chest press, bicep curls x 40, red theraflex x 40 both ways, green theraband- scapular retraction x 40      Patient left up in chair with call button in reach and chair alarm on    GOALS:   Multidisciplinary Problems       Occupational Therapy Goals          Problem: Occupational Therapy    Goal Priority Disciplines Outcome Interventions   Occupational Therapy Goal     OT, PT/OT Progressing    Description: Description: Grooming Status:   Short Term Goal: Pt will perform grooming with s/u sitting EOB.   Long Term Goal: Pt will perform grooming/oral hygiene standing at sink with Mod I      LE dressing Status:   Short Term Goal: Pt will perform LE dressing with mod a.   Long Term Goal: Pt will perform LE dressing with min a.    Toileting Status:   Short Term Goal: Pt will perform toilet hygiene on BSC with min a.  Long Term Goal: Pt will perform toilet hygiene on toilet with no AE with s/u.    Commode Transfer:   Short Term Goal: Pt will perform BSC t/f with min a.  Long Term Goal:  Pt will perform toilet t/f in bathroom with s/u.     Bathing Status:   Long Term Goal: Pt will perform sponge bath with s/u with no unsafe fatigue.     Strength Status:   Long Term Goal: Pt to perform BUE strengthening with weights and/or body weight to increase ADL independence and safety    Endurance Status:   Short Term Goal:pt to perform 15 min OT treatment with 5 or greater rest breaks  Long Term Goal: pt to perform 30 min OT treat with 3 or less rest breaks                       Time Tracking:     OT Date of Treatment: 06/04/24  OT Start Time: 1246  OT Stop Time: 1312  OT Total Time (min): 26 min    Billable Minutes:Therapeutic Exercise 26 min  Jud Kaur  OTR/L      6/4/2024

## 2024-06-04 NOTE — PLAN OF CARE
Problem: Adult Inpatient Plan of Care  Goal: Plan of Care Review  Outcome: Progressing  Flowsheets (Taken 6/4/2024 1850)  Plan of Care Reviewed With: patient  Goal: Readiness for Transition of Care  Outcome: Progressing  Intervention: Mutually Develop Transition Plan  Flowsheets (Taken 6/4/2024 1850)  Equipment Currently Used at Home: walker, rolling  Transportation Anticipated: family or friend will provide  Communicated ARACELI with patient/caregiver: Date not available/Unable to determine  Do you expect to return to your current living situation?: Yes  Do you have help at home or someone to help you manage your care at home?: Yes  Readmission within 30 days?: No  Do you currently have service(s) that help you manage your care at home?: No     Problem: Adult Inpatient Plan of Care  Goal: Optimal Comfort and Wellbeing  Intervention: Monitor Pain and Promote Comfort  Flowsheets (Taken 6/4/2024 1850)  Pain Management Interventions:   pillow support provided   quiet environment facilitated   care clustered  Intervention: Provide Person-Centered Care  Flowsheets (Taken 6/4/2024 1850)  Trust Relationship/Rapport:   care explained   questions encouraged   emotional support provided   choices provided   reassurance provided   thoughts/feelings acknowledged   empathic listening provided   questions answered

## 2024-06-04 NOTE — PROGRESS NOTES
BP 95/55 during morning rounds. Patient is asymptomatic of hypotension. Instructed to notify nursing staff before ambulating independently. Orthostatic VS performed. See below for results. Will continue to monitor.    Patient is currently taking amLODIPine tablet 10 mg daily, Coreg 3.125 mg BID and lorsatan 100 mg/hctz 25 mg. Dr. Walker notified of BP and BP medication. Telephone order received to d/c amlodipine 10 mg daily.          06/04/24 0845 06/04/24 0846 06/04/24 0900   Vital Signs   BP (!) 89/56 (!) 95/59 102/62   MAP (mmHg) 85 89 111   BP Location Left arm Left arm Left arm   Patient Position Sitting Standing Lying   Orthostatic VS Yes Yes Yes   Is Patient Symptomatic in this Position? No No No

## 2024-06-04 NOTE — PROGRESS NOTES
Patient resting in bed. NADN. No complaints or discomforts voiced. BP re-check listed below. Will continue to monitor.    06/04/24 1102   Vital Signs   /68

## 2024-06-05 LAB
GLUCOSE SERPL-MCNC: 100 MG/DL (ref 70–105)
GLUCOSE SERPL-MCNC: 127 MG/DL (ref 70–105)
GLUCOSE SERPL-MCNC: 130 MG/DL (ref 70–105)
GLUCOSE SERPL-MCNC: 169 MG/DL (ref 70–105)

## 2024-06-05 PROCEDURE — 25000003 PHARM REV CODE 250: Performed by: FAMILY MEDICINE

## 2024-06-05 PROCEDURE — 94761 N-INVAS EAR/PLS OXIMETRY MLT: CPT

## 2024-06-05 PROCEDURE — 97110 THERAPEUTIC EXERCISES: CPT

## 2024-06-05 PROCEDURE — 97116 GAIT TRAINING THERAPY: CPT | Mod: CQ

## 2024-06-05 PROCEDURE — 97530 THERAPEUTIC ACTIVITIES: CPT | Mod: CQ

## 2024-06-05 PROCEDURE — S0179 MEGESTROL 20 MG: HCPCS | Performed by: FAMILY MEDICINE

## 2024-06-05 PROCEDURE — 82962 GLUCOSE BLOOD TEST: CPT

## 2024-06-05 PROCEDURE — 11000004 HC SNF PRIVATE

## 2024-06-05 PROCEDURE — 63600175 PHARM REV CODE 636 W HCPCS: Performed by: FAMILY MEDICINE

## 2024-06-05 RX ADMIN — CARVEDILOL 3.12 MG: 3.12 TABLET, FILM COATED ORAL at 04:06

## 2024-06-05 RX ADMIN — DORZOLAMIDE HCL 1 DROP: 20 SOLUTION/ DROPS OPHTHALMIC at 08:06

## 2024-06-05 RX ADMIN — CHOLECALCIFEROL TAB 125 MCG (5000 UNIT) 5000 UNITS: 125 TAB at 08:06

## 2024-06-05 RX ADMIN — MEGESTROL ACETATE 400 MG: 400 SUSPENSION ORAL at 08:06

## 2024-06-05 RX ADMIN — LOSARTAN POTASSIUM 100 MG: 100 TABLET, FILM COATED ORAL at 08:06

## 2024-06-05 RX ADMIN — POTASSIUM CHLORIDE 20 MEQ: 1500 TABLET, EXTENDED RELEASE ORAL at 08:06

## 2024-06-05 RX ADMIN — ATORVASTATIN CALCIUM 80 MG: 40 TABLET, FILM COATED ORAL at 08:06

## 2024-06-05 RX ADMIN — DORZOLAMIDE HCL 1 DROP: 20 SOLUTION/ DROPS OPHTHALMIC at 09:06

## 2024-06-05 RX ADMIN — SENNOSIDES AND DOCUSATE SODIUM 1 TABLET: 8.6; 5 TABLET ORAL at 08:06

## 2024-06-05 RX ADMIN — ASPIRIN 81 MG: 81 TABLET, COATED ORAL at 08:06

## 2024-06-05 RX ADMIN — BIMATOPROST 1 DROP: 0.1 SOLUTION/ DROPS OPHTHALMIC at 08:06

## 2024-06-05 RX ADMIN — HYDROCHLOROTHIAZIDE 25 MG: 25 TABLET ORAL at 08:06

## 2024-06-05 RX ADMIN — INSULIN DETEMIR 30 UNITS: 100 INJECTION, SOLUTION SUBCUTANEOUS at 08:06

## 2024-06-05 RX ADMIN — CARVEDILOL 3.12 MG: 3.12 TABLET, FILM COATED ORAL at 08:06

## 2024-06-05 RX ADMIN — PIOGLITAZONE 15 MG: 15 TABLET ORAL at 08:06

## 2024-06-05 RX ADMIN — METFORMIN HYDROCHLORIDE 500 MG: 500 TABLET, FILM COATED ORAL at 08:06

## 2024-06-05 RX ADMIN — METFORMIN HYDROCHLORIDE 500 MG: 500 TABLET, FILM COATED ORAL at 04:06

## 2024-06-05 NOTE — NURSING
Pt received this morning in stable condition. Pt in recliner with legs elevated. No complaints at this time. Callbell in reach. Chair alarm on. Instructed to call for assistance.

## 2024-06-05 NOTE — PT/OT/SLP PROGRESS
Occupational Therapy   Treatment    Name: Milton Johnson  MRN: [id number]  Admitting Diagnosis:  Muscular weakness       Recommendations:     Discharge Recommendations:    Discharge Equipment Recommendations:  walker, rolling  Barriers to discharge:       Assessment:     Milton Johnson is a 78 y.o. male with a medical diagnosis of Muscular weakness.   Performance deficits affecting function are weakness, impaired endurance, impaired self care skills, impaired functional mobility, impaired balance, decreased lower extremity function, decreased safety awareness.     Rehab Prognosis:  Good; patient would benefit from acute skilled OT services to address these deficits and reach maximum level of function.       Plan:     Patient to be seen 5 x/week to address the above listed problems via therapeutic exercises, therapeutic activities, self-care/home management  Plan of Care Expires:    Plan of Care Reviewed with: patient    Subjective     Chief Complaint: Decreased mobility  Patient/Family Comments/goals: Get stronger  Pain/Comfort:  Pain Rating 1: 0/10    Objective:     Communicated with: RN prior to session.  Patient found up in chair with   upon OT entry to room.    General Precautions: Standard, fall    Orthopedic Precautions:   Braces:    Respiratory Status: Room air     Occupational Performance:     Bed Mobility:    Patient was up in his chair already    Functional Mobility/Transfers:  Patient completed Sit <> Stand Transfer with minimum assistance  with  rolling walker   Functional Mobility: min a with RW    Activities of Daily Living:  Feeding S/u   Grooming S/u   Bathing Min a   UB dsg S/u   LB dsg Min a   Toileting Mod I   Toilet t/f Mod I         [facility] 6 Click ADL: 23    Treatment & Education:  Pt educated on OT role/POC.   Importance of OOB activity with staff assistance.  Importance of sitting up in the chair throughout the day as tolerated, especially for meals   Safety during functional t/f and  mobility  Importance of assisting with self-care activities     Patient completed the following for increased strength to increase I with ADLs: UBE 8 min x 1x, 5# dowel- shoulder press, chest press, bicep curls x 40, blue theraflex x 40 both ways, green theraband- scapular retraction x 40      Patient left up in chair with call button in reach and chair alarm on    GOALS:   Multidisciplinary Problems       Occupational Therapy Goals          Problem: Occupational Therapy    Goal Priority Disciplines Outcome Interventions   Occupational Therapy Goal     OT, PT/OT Progressing    Description: Description: Grooming Status:   Short Term Goal: Pt will perform grooming with s/u sitting EOB.   Long Term Goal: Pt will perform grooming/oral hygiene standing at sink with Mod I      LE dressing Status:   Short Term Goal: Pt will perform LE dressing with mod a.   Long Term Goal: Pt will perform LE dressing with min a.    Toileting Status:   Short Term Goal: Pt will perform toilet hygiene on BSC with min a.  Long Term Goal: Pt will perform toilet hygiene on toilet with no AE with s/u.    Commode Transfer:   Short Term Goal: Pt will perform BSC t/f with min a.  Long Term Goal:  Pt will perform toilet t/f in bathroom with s/u.     Bathing Status:   Long Term Goal: Pt will perform sponge bath with s/u with no unsafe fatigue.     Strength Status:   Long Term Goal: Pt to perform BUE strengthening with weights and/or body weight to increase ADL independence and safety    Endurance Status:   Short Term Goal:pt to perform 15 min OT treatment with 5 or greater rest breaks  Long Term Goal: pt to perform 30 min OT treat with 3 or less rest breaks                       Time Tracking:     OT Date of Treatment: 06/05/24  OT Start Time: 0927  OT Stop Time: 0954  OT Total Time (min): 27 min    Billable Minutes:Therapeutic Exercise 27 min  Jud Kaur OTR/L      6/5/2024

## 2024-06-05 NOTE — NURSING
Patient lying in bed, with eyes open on tablet. NADN. Denies pain and discomfort. Refused toileting at this time. Water cup remains full of water. Patient encourage drink plenty of water when awake due to low sodium levels, understanding voiced.safety measures intact. Call bell within reach.water, urinal on bedside table. Patient instructed to call for assistance.

## 2024-06-05 NOTE — PLAN OF CARE
Problem: Pain Acute  Goal: Optimal Pain Control and Function  6/4/2024 2003 by Julieth Dasilva RN  Outcome: Progressing  6/4/2024 2003 by Julieth Dasilva RN  Outcome: Progressing     Problem: Fall Injury Risk  Goal: Absence of Fall and Fall-Related Injury  6/4/2024 2003 by Julieth Dasilva RN  Outcome: Progressing  6/4/2024 2003 by Julieth Dasilva RN  Outcome: Progressing     Problem: Infection  Goal: Absence of Infection Signs and Symptoms  6/4/2024 2003 by Julieth Dasilva RN  Outcome: Progressing  6/4/2024 2003 by Julieth Dasilva RN  Outcome: Progressing     Problem: VTE (Venous Thromboembolism)  Goal: Tissue Perfusion  6/4/2024 2003 by Julieth Dasilva RN  Outcome: Progressing  6/4/2024 2003 by Julieth Dasivla RN  Outcome: Progressing

## 2024-06-05 NOTE — PLAN OF CARE
Problem: Adult Inpatient Plan of Care  Goal: Patient-Specific Goal (Individualized)  Outcome: Progressing  Goal: Optimal Comfort and Wellbeing  Outcome: Progressing  Intervention: Monitor Pain and Promote Comfort  Flowsheets (Taken 6/5/2024 1346)  Pain Management Interventions:   care clustered   medication offered but refused   pillow support provided   position adjusted  Intervention: Provide Person-Centered Care  Flowsheets (Taken 6/5/2024 1346)  Trust Relationship/Rapport:   care explained   choices provided   emotional support provided   empathic listening provided   questions answered   questions encouraged   reassurance provided   thoughts/feelings acknowledged     Problem: Fall Injury Risk  Goal: Absence of Fall and Fall-Related Injury  Outcome: Progressing  Intervention: Identify and Manage Contributors  Flowsheets (Taken 6/5/2024 1346)  Self-Care Promotion:   independence encouraged   BADL personal objects within reach   BADL personal routines maintained   meal set-up provided   adaptive equipment use encouraged  Medication Review/Management:   medications reviewed   dosing adjusted   high-risk medications identified  Intervention: Promote Injury-Free Environment  Flowsheets (Taken 6/5/2024 1346)  Safety Promotion/Fall Prevention:   assistive device/personal item within reach   chair alarm set   Fall Risk reviewed with patient/family   high risk medications identified   in recliner, wheels locked   medications reviewed   nonskid shoes/socks when out of bed   instructed to call staff for mobility

## 2024-06-05 NOTE — PT/OT/SLP PROGRESS
Physical Therapy Treatment    Patient Name:  Milton Johnson   MRN:  [id number]    Recommendations:     Discharge Recommendations: Low Intensity Therapy  Discharge Equipment Recommendations: walker, rolling  Barriers to discharge: None    Assessment:     Milton Johnson is a 78 y.o. male admitted with a medical diagnosis of Muscular weakness.  He presents with the following impairments/functional limitations: weakness, impaired self care skills, impaired functional mobility, decreased lower extremity function, gait instability, decreased upper extremity function.  Pt required occasional verbal and visual cues to complete exercises with proper alignment, speed of movement, posture, and count.  Patient requires frequent rest breaks throughout PT treatment session secondary to reports of muscle fatigue. Patient ambulates with slow genaro and occasional pathway deviations requiring verbal and tactile cues during gait training.  Patient is slow and deliberate negotiating steps.        Rehab Prognosis: Good; patient would benefit from acute skilled PT services to address these deficits and reach maximum level of function.    Recent Surgery: * No surgery found *      Plan:     During this hospitalization, patient to be seen 5 x/week to address the identified rehab impairments via gait training, therapeutic activities, therapeutic exercises and progress toward the following goals:    Plan of Care Expires:  06/11/24    Subjective     Chief Complaint: Decreased mobility   Patient/Family Comments/goals: Get stronger   Pain/Comfort:  Pain Rating 1: 0/10      Objective:     Patient found seated in suraj-chair in rehab gym.  Patient states he is feeling better today than yesterday, and is planning to go home Friday.  Patient is concerned about steps at his home.     General Precautions: Standard, fall  Orthopedic Precautions: RLE weight bearing as tolerated  Braces: N/A  Respiratory Status: Room air     Functional  Mobility:  Transfers:     Sit to Stand:  contact guard assistance with rolling walker  Gait: Approx. 150' x 1  with RW and CGA  x 1 on level indoor surface with verbal and visual cues for proper AD/LE sequencing.        AM-PAC 6 CLICK MOBILITY  Turning over in bed (including adjusting bedclothes, sheets and blankets)?: 3  Sitting down on and standing up from a chair with arms (e.g., wheelchair, bedside commode, etc.): 3  Moving from lying on back to sitting on the side of the bed?: 3  Moving to and from a bed to a chair (including a wheelchair)?: 3  Need to walk in hospital room?: 3  Climbing 3-5 steps with a railing?: 3  Basic Mobility Total Score: 18       Therapuetic activities:  stair training x 6 trials on varying step height with bilateral hand rails CGA   - Standing to bilateral LE's , 2 x 10:  heel raises, mini squats  -Standing to Right LE, 2 x 10, hip abduction, hip and knee flexion, hip extension   -Sit to stands 8x     Patient left in [facility] with call bell handy and his wife present in his room.     GOALS:   Multidisciplinary Problems       Physical Therapy Goals          Problem: Physical Therapy    Goal Priority Disciplines Outcome Goal Variances Interventions   Physical Therapy Goal     PT, PT/OT      Description: Short Term Goals  1. Patient will complete 30 reps of B LE exercises with correct form.   2. Patient will complete sit<>stand transfers with SBA.  3. Patient will ambulate 100 feet with RW on level surfaces with CGA.     Long Term Goals   1. Patient will ambulate 300 feet with RW on level and unlevel surfaces with SBA.  2. Patient will complete all functional transfers with MOD I.  3. Patient will negotiate up and down 2 stairs with use of handrail with SBA.                        Time Tracking:     PT Received On: 06/05/24  PT Start Time: 1423     PT Stop Time: 1459  PT Total Time (min): 36 min     Billable Minutes: Gait 8, Therapeutic activities 28       Treatment Type:  Treatment  PT/PTA: PTA     Number of PTA visits since last PT visit: 4     Continue Plan of Care Per PT order to progress patient toward rehab goals as tolerated by patient.   ROSALVA Alcantara   06/05/2024

## 2024-06-05 NOTE — NURSING
Spoke with pt about GI consult.  Asked pt if he had a preference.  Pt stated he would prefer for Dr. Shetty to make referral when he d/c's and sees him at his office.

## 2024-06-05 NOTE — PLAN OF CARE
Pt notified of NOMNC being issued by [facility]. NOMNC explained and pt signed.

## 2024-06-05 NOTE — NURSING
No change in pt status. Patient in chair. Alert and orient, nadn. No complaints of difficulty swallow this shift. Denies pain and discomfort. Glasses on for vision. Safety measures intact, cb,urinal,water within reach. Pt encourage to call for assistance. Walker noted for ambulation. Room clutter free.

## 2024-06-05 NOTE — NURSING
Pt in bed with eyes closed. Visual rise and fall. Nadn. Safety measures intact. Bed low. Nonskid socks on. Call bell, walker, water and urinal within reach. Room clutter free.

## 2024-06-06 LAB
ANION GAP SERPL CALCULATED.3IONS-SCNC: 12 MMOL/L (ref 7–16)
BASOPHILS # BLD AUTO: 0.05 K/UL (ref 0–0.2)
BASOPHILS NFR BLD AUTO: 0.5 % (ref 0–1)
BUN SERPL-MCNC: 51 MG/DL (ref 7–18)
BUN/CREAT SERPL: 31 (ref 6–20)
CALCIUM SERPL-MCNC: 9 MG/DL (ref 8.5–10.1)
CHLORIDE SERPL-SCNC: 94 MMOL/L (ref 98–107)
CO2 SERPL-SCNC: 21 MMOL/L (ref 21–32)
CREAT SERPL-MCNC: 1.66 MG/DL (ref 0.7–1.3)
DIFFERENTIAL METHOD BLD: ABNORMAL
EGFR (NO RACE VARIABLE) (RUSH/TITUS): 42 ML/MIN/1.73M2
EOSINOPHIL # BLD AUTO: 0.2 K/UL (ref 0–0.5)
EOSINOPHIL NFR BLD AUTO: 2 % (ref 1–4)
ERYTHROCYTE [DISTWIDTH] IN BLOOD BY AUTOMATED COUNT: 13.6 % (ref 11.5–14.5)
GLUCOSE SERPL-MCNC: 108 MG/DL (ref 70–105)
GLUCOSE SERPL-MCNC: 111 MG/DL (ref 70–105)
GLUCOSE SERPL-MCNC: 115 MG/DL (ref 70–105)
GLUCOSE SERPL-MCNC: 92 MG/DL (ref 74–106)
HCT VFR BLD AUTO: 30.1 % (ref 40–54)
HGB BLD-MCNC: 10.3 G/DL (ref 13.5–18)
IMM GRANULOCYTES # BLD AUTO: 0.07 K/UL (ref 0–0.04)
IMM GRANULOCYTES NFR BLD: 0.7 % (ref 0–0.4)
LYMPHOCYTES # BLD AUTO: 1.05 K/UL (ref 1–4.8)
LYMPHOCYTES NFR BLD AUTO: 10.4 % (ref 27–41)
MCH RBC QN AUTO: 29.5 PG (ref 27–31)
MCHC RBC AUTO-ENTMCNC: 34.2 G/DL (ref 32–36)
MCV RBC AUTO: 86.2 FL (ref 80–96)
MONOCYTES # BLD AUTO: 0.82 K/UL (ref 0–0.8)
MONOCYTES NFR BLD AUTO: 8.1 % (ref 2–6)
MPC BLD CALC-MCNC: 9.1 FL (ref 9.4–12.4)
NEUTROPHILS # BLD AUTO: 7.89 K/UL (ref 1.8–7.7)
NEUTROPHILS NFR BLD AUTO: 78.3 % (ref 53–65)
NRBC # BLD AUTO: 0 X10E3/UL
NRBC, AUTO (.00): 0 %
PLATELET # BLD AUTO: 267 K/UL (ref 150–400)
POTASSIUM SERPL-SCNC: 4.5 MMOL/L (ref 3.5–5.1)
RBC # BLD AUTO: 3.49 M/UL (ref 4.6–6.2)
SODIUM SERPL-SCNC: 122 MMOL/L (ref 136–145)
WBC # BLD AUTO: 10.08 K/UL (ref 4.5–11)

## 2024-06-06 PROCEDURE — 82962 GLUCOSE BLOOD TEST: CPT

## 2024-06-06 PROCEDURE — 36415 COLL VENOUS BLD VENIPUNCTURE: CPT | Performed by: FAMILY MEDICINE

## 2024-06-06 PROCEDURE — 97116 GAIT TRAINING THERAPY: CPT | Mod: CQ

## 2024-06-06 PROCEDURE — 94761 N-INVAS EAR/PLS OXIMETRY MLT: CPT

## 2024-06-06 PROCEDURE — 25000003 PHARM REV CODE 250: Performed by: FAMILY MEDICINE

## 2024-06-06 PROCEDURE — 11000004 HC SNF PRIVATE

## 2024-06-06 PROCEDURE — 97110 THERAPEUTIC EXERCISES: CPT | Mod: CQ

## 2024-06-06 PROCEDURE — S0179 MEGESTROL 20 MG: HCPCS | Performed by: FAMILY MEDICINE

## 2024-06-06 PROCEDURE — 97110 THERAPEUTIC EXERCISES: CPT

## 2024-06-06 PROCEDURE — 85025 COMPLETE CBC W/AUTO DIFF WBC: CPT | Performed by: FAMILY MEDICINE

## 2024-06-06 PROCEDURE — 80048 BASIC METABOLIC PNL TOTAL CA: CPT | Performed by: FAMILY MEDICINE

## 2024-06-06 RX ADMIN — CARVEDILOL 3.12 MG: 3.12 TABLET, FILM COATED ORAL at 04:06

## 2024-06-06 RX ADMIN — HYDROCHLOROTHIAZIDE 25 MG: 25 TABLET ORAL at 08:06

## 2024-06-06 RX ADMIN — PIOGLITAZONE 15 MG: 15 TABLET ORAL at 08:06

## 2024-06-06 RX ADMIN — LOSARTAN POTASSIUM 100 MG: 100 TABLET, FILM COATED ORAL at 08:06

## 2024-06-06 RX ADMIN — BIMATOPROST 1 DROP: 0.1 SOLUTION/ DROPS OPHTHALMIC at 08:06

## 2024-06-06 RX ADMIN — ATORVASTATIN CALCIUM 80 MG: 40 TABLET, FILM COATED ORAL at 08:06

## 2024-06-06 RX ADMIN — MEGESTROL ACETATE 400 MG: 400 SUSPENSION ORAL at 08:06

## 2024-06-06 RX ADMIN — SENNOSIDES AND DOCUSATE SODIUM 1 TABLET: 8.6; 5 TABLET ORAL at 08:06

## 2024-06-06 RX ADMIN — POTASSIUM CHLORIDE 20 MEQ: 1500 TABLET, EXTENDED RELEASE ORAL at 08:06

## 2024-06-06 RX ADMIN — METFORMIN HYDROCHLORIDE 500 MG: 500 TABLET, FILM COATED ORAL at 08:06

## 2024-06-06 RX ADMIN — CARVEDILOL 3.12 MG: 3.12 TABLET, FILM COATED ORAL at 08:06

## 2024-06-06 RX ADMIN — DORZOLAMIDE HCL 1 DROP: 20 SOLUTION/ DROPS OPHTHALMIC at 08:06

## 2024-06-06 RX ADMIN — ASPIRIN 81 MG: 81 TABLET, COATED ORAL at 08:06

## 2024-06-06 RX ADMIN — CHOLECALCIFEROL TAB 125 MCG (5000 UNIT) 5000 UNITS: 125 TAB at 08:06

## 2024-06-06 RX ADMIN — METFORMIN HYDROCHLORIDE 500 MG: 500 TABLET, FILM COATED ORAL at 04:06

## 2024-06-06 NOTE — NURSING
Pt sitting up in chair with glasses on for vision. Alert and orient. No change in pt status this shift. Speech clear. No complaints voiced. Denies pain and discomfort. NADN. Safety measures intact, chair alarm on. Cb,water,urinal within reach. Walker noted for ambulation. Pt instructed to call for assistance with ambulating.

## 2024-06-06 NOTE — NURSING
Received patient in bed, alert and orient, speech clear. Denies pain and discomfort. No complaints voiced. Glasses on for vision. Tablet,cell phone,water,call bell on bed side table, within reach. Walker noted for ambulation. Bed low, patient refused bed alarm, states's the light keeps him form sleeping, education done, understanding voiced. Patient states he will call for assistance if needed. Patient encourage to drink water as instructed by doctors. Patient educated about the risk of low sodium levels. Understanding voiced. Patient states lbm was today.Room clutter free.

## 2024-06-06 NOTE — PLAN OF CARE
[facility] - Medical Surgical Unit - Swing Bed   Interdisciplinary Team Meeting    Patient: Milton Johnson   Today's Date: 6/6/2024   Estimated D/C Date:         Physician: Valentina Walker MD Unit Director: Rose Mary Em RN   Pharmacy: Julieth Richardson, PharmD Nursing: STEWART Tran RN   : Johana Patel RN Physical/Occupational Therapy: Jud Kaur OT, PELON Waddell PT   Speech Therapy: ST [facility] Respiratory: See respiratory notes   Dietary: See dietary notes   Other: n/a     Nursing  New Symptoms/Problems: none at this time      Urine: continent  Jackson: No   Bowel: continent  Last Bowel Movement: 06/06/24 (per patient)   Constipated: No  Diarrhea: No   Isolation: No  Cognition: WNL  Aspiration Precautions: No  Wound Care: Yes  Wound Location/Tx: right hip  Comment(s): n/a     Respiratory  O2 Device: Room Air  O2 Flow: n/a  SpO2: 96%  Neb Tx: No  Comment(s): n/a     Dietary  Nutrition: Diabetic  Comment(s): 2000 moshe.    Speech Therapy  Speech/Swallowing: No current speech or swallowing issues  Comment(s): n/a    Physical Therapy  Gait/Assistive Device: Approx. 150' x 1 with RW and CGA x 1 on level indoor surface with verbal and visual cues for proper AD/LE sequencing.  ELOS: Plan to DC     Transfers: Contact Guard Assistance  Bed Mobility: Activity did not occur Range of Motion/Restrictions: RLE weight bearing as tolerated   Comment(s): n/a      Occupational Therapy  Eating/Grooming: Supervision or Set-up Assistance Toileting: Moderate Assistance   Bathing: Minimal Assistance Dressing (Upper Body): Supervision or Set-up Assistance   Dressing (Lower Body): Minimal Assistance   Comment(s): n/a   Activity Therapy  Level of participation: Active participation  Comment(s): n/a    Pharmacy  Medication Changes: No  Labs Reviewed: Yes  New Lab Orders: No  Comment(s): lab q mon/thurs      Tx Plan/Recommendations reviewed with family and/or patient on 6/5/24.  Additional family  Conference/Training: n/a  D/C Plan/Recommendations: Home with family and Outpatient Rehab  ARACELI:   Comment(s): pt discharging home tomorrow

## 2024-06-06 NOTE — PLAN OF CARE
Problem: Adult Inpatient Plan of Care  Goal: Plan of Care Review  Outcome: Progressing  Goal: Patient-Specific Goal (Individualized)  Outcome: Progressing     Problem: Fall Injury Risk  Goal: Absence of Fall and Fall-Related Injury  Outcome: Progressing  Intervention: Identify and Manage Contributors  Flowsheets (Taken 6/6/2024 1639)  Self-Care Promotion:   independence encouraged   BADL personal objects within reach   BADL personal routines maintained  Medication Review/Management:   medications reviewed   high-risk medications identified  Intervention: Promote Injury-Free Environment  Flowsheets (Taken 6/6/2024 1639)  Safety Promotion/Fall Prevention:   assistive device/personal item within reach   diversional activities provided   Fall Risk reviewed with patient/family   Fall Risk signage in place   high risk medications identified   in recliner, wheels locked   medications reviewed   muscle strengthening facilitated   nonskid shoes/socks when out of bed   side rails raised x 3   instructed to call staff for mobility

## 2024-06-06 NOTE — PT/OT/SLP PROGRESS
"Physical Therapy Treatment    Patient Name:  Milton Johnson   MRN:  [id number]    Recommendations:     Discharge Recommendations: Low Intensity Therapy  Discharge Equipment Recommendations: walker, rolling  Barriers to discharge: None    Assessment:     Milton Johnson is a 78 y.o. male admitted with a medical diagnosis of Muscular weakness.  He presents with the following impairments/functional limitations: weakness, impaired self care skills, impaired functional mobility, gait instability, decreased upper extremity function, decreased lower extremity function, orthopedic precautions.  Pt required occasional verbal and visual cues to complete exercises with proper alignment, speed of movement, posture, and count.  Patient continues to require frequent rest breaks throughout PT treatment session, but is able to increase distance during gait training.  No adverse effects noted or reported.     Rehab Prognosis: Good; patient would benefit from acute skilled PT services to address these deficits and reach maximum level of function.    Recent Surgery: * No surgery found *      Plan:     During this hospitalization, patient to be seen 5 x/week to address the identified rehab impairments via gait training, therapeutic activities, therapeutic exercises and progress toward the following goals:    Plan of Care Expires:  06/11/24    Subjective     Chief Complaint: Decreased mobility   Patient/Family Comments/goals: Get stronger   Pain/Comfort:  Pain Rating 1: 0/10  Pain Addressed 1: Cessation of Activity      Objective:     Patient is agreeable to physical therapy treatment session.  Patient reports soreness in bilateral LE's and states "I think it's from practicing on the steps yesterday".     General Precautions: Standard, fall  Orthopedic Precautions: RLE weight bearing as tolerated  Braces: N/A  Respiratory Status: Room air     Functional Mobility:  Transfers:     Sit to Stand:  contact guard assistance with rolling " "walker  Gait: Approx. 150' x 2 with RW and CGA  x 1 on level indoor surface with verbal and visual cues for proper AD/LE sequencing.        AM-PAC 6 CLICK MOBILITY  Turning over in bed (including adjusting bedclothes, sheets and blankets)?: 3  Sitting down on and standing up from a chair with arms (e.g., wheelchair, bedside commode, etc.): 3  Moving from lying on back to sitting on the side of the bed?: 3  Moving to and from a bed to a chair (including a wheelchair)?: 3  Need to walk in hospital room?: 3  Climbing 3-5 steps with a railing?: 3  Basic Mobility Total Score: 18     Ther-Ex Reps       Ankle pumps 30 x    Quad sets 30 x 3"   Horizontal Hip Abduction/Hip ADD 2 x 10   Hip ADD 2 x 10   Heelslides    LAQ's 2 x 10   Hamstring curls    Seated Marching    Hip ABD    Glut sets  30 x 3" *    Supine straight leg raises'  2 x 10, active assisted *           Patient left in bed with call bell within reach.     GOALS:   Multidisciplinary Problems       Physical Therapy Goals          Problem: Physical Therapy    Goal Priority Disciplines Outcome Goal Variances Interventions   Physical Therapy Goal     PT, PT/OT      Description: Short Term Goals  1. Patient will complete 30 reps of B LE exercises with correct form.   2. Patient will complete sit<>stand transfers with SBA.  3. Patient will ambulate 100 feet with RW on level surfaces with CGA.     Long Term Goals   1. Patient will ambulate 300 feet with RW on level and unlevel surfaces with SBA.  2. Patient will complete all functional transfers with MOD I.  3. Patient will negotiate up and down 2 stairs with use of handrail with SBA.                        Time Tracking:     PT Received On: 06/06/24  PT Start Time: 1255     PT Stop Time: 1325  PT Total Time (min): 30 min     Billable Minutes: Gait 8, Therapeutic activities 20      Treatment Type: Treatment  PT/PTA: PTA     Number of PTA visits since last PT visit: 5     Continue Plan of Care Per PT order to progress " patient toward rehab goals as tolerated by patient.   ROSALVA Alcantara   06/06/2024

## 2024-06-06 NOTE — PT/OT/SLP PROGRESS
Occupational Therapy   Treatment    Name: Milton Johnson  MRN: [id number]  Admitting Diagnosis:  Muscular weakness       Recommendations:     Discharge Recommendations:    Discharge Equipment Recommendations:  walker, rolling  Barriers to discharge:       Assessment:     Milton Johnson is a 78 y.o. male with a medical diagnosis of Muscular weakness.   Performance deficits affecting function are weakness, impaired endurance, impaired self care skills, impaired functional mobility, impaired balance, decreased lower extremity function, decreased safety awareness.     Rehab Prognosis:  Good; patient would benefit from acute skilled OT services to address these deficits and reach maximum level of function.       Plan:     Patient to be seen 5 x/week to address the above listed problems via therapeutic exercises, therapeutic activities, self-care/home management  Plan of Care Expires:    Plan of Care Reviewed with: patient    Subjective     Chief Complaint: Decreased mobility  Patient/Family Comments/goals: Get stronger  Pain/Comfort:  Pain Rating 1: 0/10    Objective:     Communicated with: RN prior to session.  Patient found up in chair with   upon OT entry to room.    General Precautions: Standard, fall    Orthopedic Precautions:   Braces:    Respiratory Status: Room air     Occupational Performance:     Bed Mobility:    Patient was up in his chair already    Functional Mobility/Transfers:  Patient completed Sit <> Stand Transfer with minimum assistance  with  rolling walker   Functional Mobility: min a with RW    Activities of Daily Living:  Feeding S/u   Grooming S/u   Bathing Min a   UB dsg S/u   LB dsg Min a   Toileting Mod I   Toilet t/f Mod I         AMPAC 6 Click ADL: 23    Treatment & Education:  Pt educated on OT role/POC.   Importance of OOB activity with staff assistance.  Importance of sitting up in the chair throughout the day as tolerated, especially for meals   Safety during functional t/f and  mobility  Importance of assisting with self-care activities     Patient completed the following for increased strength to increase I with ADLs: UBE 8 min x 1x, 5# dowel- shoulder press, chest press, bicep curls x 40, blue theraflex x 40 both ways, green theraband- scapular retraction and tricep extension x 40      Patient left up in chair with call button in reach and chair alarm on    GOALS:   Multidisciplinary Problems       Occupational Therapy Goals          Problem: Occupational Therapy    Goal Priority Disciplines Outcome Interventions   Occupational Therapy Goal     OT, PT/OT Progressing    Description: Description: Grooming Status:   Short Term Goal: Pt will perform grooming with s/u sitting EOB.   Long Term Goal: Pt will perform grooming/oral hygiene standing at sink with Mod I      LE dressing Status:   Short Term Goal: Pt will perform LE dressing with mod a.   Long Term Goal: Pt will perform LE dressing with min a.    Toileting Status:   Short Term Goal: Pt will perform toilet hygiene on BSC with min a.  Long Term Goal: Pt will perform toilet hygiene on toilet with no AE with s/u.    Commode Transfer:   Short Term Goal: Pt will perform BSC t/f with min a.  Long Term Goal:  Pt will perform toilet t/f in bathroom with s/u.     Bathing Status:   Long Term Goal: Pt will perform sponge bath with s/u with no unsafe fatigue.     Strength Status:   Long Term Goal: Pt to perform BUE strengthening with weights and/or body weight to increase ADL independence and safety    Endurance Status:   Short Term Goal:pt to perform 15 min OT treatment with 5 or greater rest breaks  Long Term Goal: pt to perform 30 min OT treat with 3 or less rest breaks                       Time Tracking:     OT Date of Treatment: 06/06/24  OT Start Time: 0809  OT Stop Time: 0844  OT Total Time (min): 35 min    Billable Minutes:Therapeutic Exercise 35 min  Jud Kaur OTR/L      6/6/2024

## 2024-06-06 NOTE — PROGRESS NOTES
[facility]  Adult Nutrition  Follow-up Note         Reason for Assessment  Reason For Assessment: RD follow-up assess  Nutrition Risk Screen: no indicators present    Assessment and Plan    6/6/2024: RD follow up. Patient continues on a 2000kcal Consistent Carbohydrate Diet and tolerated well. Documented intake 100% per flowsheet. Current weight 100.2kg. Last BM 6/5 per flowsheet. Recommend continue current diet as tolerated. Encourage continued good PO intakes. RD following.     5/28/2024 RD Follow up: Patient continues on a 2000 CCD. Po intake remains good with 100% intake per flowsheets. Current weight 101.5 kg. Last BM 5/26. Continue current POC. RD Following.     Consult received and appreciated. Patient admitted 5/21 with a dx of R hip Fx s/p ORIF, Muscular weakness, and DM. Patient is ordered a 2000 calorie consistent carbohydrate diet. Po intake since admit is good with 100% per flowsheets.     Patient is 105 kg with a BMI of 30.54 which is overweight. Diet appropriate at this time. Recommend to continue diet as tolerated. RD Following.           Learning Needs/Social Determinants of Health  Learning Assessment       05/21/2024 1343 [facility] (5/21/2024 - Present)   Created by Karol Jackson, RN - RN (Nurse) Status: Complete                 PRIMARY LEARNER     Primary Learner Name:  Mr. Johnson TH - 05/21/2024 1343    Relationship:  Patient TH - 05/21/2024 1343    Does the primary learner have any barriers to learning?:  No Barriers TH - 05/21/2024 1343    What is the preferred language of the primary learner?:  English TH - 05/21/2024 1343    Is an  required?:  Yes TH - 05/21/2024 1343    How does the primary learner prefer to learn new concepts?:  Listening TH - 05/21/2024 1343    How often do you need to have someone help you read instructions, pamphlets, or written material from your doctor or pharmacy?:  Never TH -  05/21/2024 1343        CO-LEARNER #1     No question answered        CO-LEARNER #2     No question answered        SPECIAL TOPICS     No question answered        ANSWERED BY:     No question answered        Comments         Edit History       Karol Jackson, RN - RN (Nurse)   05/21/2024 1343                           Social Determinants of Health     Tobacco Use: Low Risk  (5/21/2024)    Patient History     Smoking Tobacco Use: Never     Smokeless Tobacco Use: Never     Passive Exposure: Past   Alcohol Use: Not At Risk (5/21/2024)    AUDIT-C     Frequency of Alcohol Consumption: Never     Average Number of Drinks: Patient does not drink     Frequency of Binge Drinking: Never   Financial Resource Strain: Low Risk  (5/21/2024)    Overall Financial Resource Strain (CARDIA)     Difficulty of Paying Living Expenses: Not hard at all   Food Insecurity: No Food Insecurity (5/21/2024)    Hunger Vital Sign     Worried About Running Out of Food in the Last Year: Never true     Ran Out of Food in the Last Year: Never true   Transportation Needs: No Transportation Needs (5/21/2024)    TRANSPORTATION NEEDS     Transportation : No   Physical Activity: Sufficiently Active (5/21/2024)    Exercise Vital Sign     Days of Exercise per Week: 5 days     Minutes of Exercise per Session: 30 min   Stress: No Stress Concern Present (5/21/2024)    [facility] [place] [facility] [employer] [facility] - Occupational Stress Questionnaire     Feeling of Stress : Not at all   Housing Stability: Low Risk  (5/21/2024)    Housing Stability Vital Sign     Unable to Pay for Housing in the Last Year: No     Homeless in the Last Year: No   Depression: Low Risk  (2/6/2024)    Depression     Last PHQ-4: Flowsheet Data: 1   Utilities: Not At Risk (5/21/2024)    [facility] Utilities     Threatened with loss of utilities: No   Health Literacy: Adequate Health Literacy (5/21/2024)     Health Literacy     Frequency of need for help with medical instructions: Never    Social Isolation: Socially Integrated (5/21/2024)    Social Isolation     Social Isolation: 1            Malnutrition  Is Patient Malnourished: No    Nutrition Diagnosis  Altered nutrition related laboratory values related to Diabetes Mellitus as evidenced by elevated BG  Comments: diet appropriate    Recent Labs   Lab 06/05/24 2035 06/06/24  0501   GLU  --  92   POCGLU 130*  --          Nutrition Prescription / Recommendations  Recommendation/Intervention: Continue diet as tolerated  Goals: po intake % during admission  Nutrition Goal Status: goal met  Current Diet Order: 2000 calorie consistent carbohydrate  Chewing or Swallowing Difficulty?: No Chewing or swallowing difficulty  Recommended Diet: Consistent Carbohydrate 2000 (75g Carbs)  Recommended Oral Supplement: No Oral Supplements  Is Nutrition Support Recommended: [facility] Support: No  Is Nutrition Education Recommended: No    Monitor and Evaluation  % current Intake: P.O. intake of 75 - 100 %  % intake to meet estimated needs: 75 - 100 %  Food and Nutrient Intake: energy intake, food and beverage intake  Food and Nutrient Adminstration: diet order  Anthropometric Measurements: height/length, weight, body mass index, weight change  Biochemical Data, Medical Tests and Procedures: electrolyte and renal panel, gastrointestinal profile, glucose/endocrine profile, inflammatory profile  Energy Calories Required: meeting needs  Protein Required: meeting needs  Fluid Required: meeting needs  Tolerance: tolerating    Current Medical Diagnosis and Past Medical History     Past Medical History:   Diagnosis Date    Diabetes mellitus     Diabetes mellitus, type 2     Glaucoma     High cholesterol     Hypertension        Nutrition/Diet History  Spiritual, Cultural Beliefs, Sabianism Practices, Values that Affect Care: no  Food Allergies: NKFA  Factors Affecting Nutritional Intake: None identified at this time    Lab/Procedures/Meds  Recent Labs  "  Lab 06/06/24  0501   *   K 4.5   BUN 51*   CREATININE 1.66*   CALCIUM 9.0   CL 94*   Note: Na+, Cl- low. Recommend consider replete to WNL as appropriate. BUN/Cr elevated. PMH CKD3a.    Last A1c:   Lab Results   Component Value Date    HGBA1C 9.4 (H) 04/30/2024    HGBA1C 8.1 (H) 07/07/2022   Note: HbA1c elevated. PMH DM2    Lab Results   Component Value Date    RBC 3.49 (L) 06/06/2024    HGB 10.3 (L) 06/06/2024    HCT 30.1 (L) 06/06/2024    MCV 86.2 06/06/2024    MCH 29.5 06/06/2024    MCHC 34.2 06/06/2024   Note: H&H low    Pertinent Labs Reviewed: reviewed  Pertinent Medications Reviewed: reviewed  Scheduled Meds:   aspirin  81 mg Oral BID    atorvastatin  80 mg Oral QHS    bimatoprost  1 drop Both Eyes QHS    carvediloL  3.125 mg Oral BID WM    cholecalciferol (vitamin D3)  5,000 Units Oral Daily    dorzolamide  1 drop Right Eye BID    losartan  100 mg Oral Daily    And    hydroCHLOROthiazide  25 mg Oral Daily    insulin detemir U-100  30 Units Subcutaneous QHS    liraglutide 0.6 mg/0.1 mL (18 mg/3 mL) subq PNIJ  1.8 mg Subcutaneous Daily    megestroL  400 mg Oral Daily    metFORMIN  500 mg Oral BID WM    pioglitazone  15 mg Oral Daily    polyethylene glycol  17 g Oral Daily    potassium chloride  20 mEq Oral BID    senna-docusate 8.6-50 mg  1 tablet Oral BID     Continuous Infusions:  PRN Meds:.  Current Facility-Administered Medications:     acetaminophen, 650 mg, Oral, Q6H PRN    calcium carbonate, 500 mg, Oral, BID PRN    lactulose, 30 g, Oral, Q6H PRN    melatonin, 6 mg, Oral, Nightly PRN    oxyCODONE-acetaminophen, 1 tablet, Oral, Q6H PRN    Anthropometrics  Temp: 97.6 °F (36.4 °C)  Height Method: Stated  Height: 6' 1" (185.4 cm)  Height (inches): 73 in  Weight Method: Standard Scale  Weight: 100.2 kg (220 lb 14.4 oz)  Weight (lb): 220.9 lb  Ideal Body Weight (IBW), Male: 184 lb  % Ideal Body Weight, Male (lb): 125.81 %  BMI (Calculated): 29.2       Estimated/Assessed Needs      Temp: 97.6 °F (36.4 " °C)Oral  Weight Used For Calorie Calculations: 105 kg (231 lb 7.7 oz)   Energy Need Method: Kcal/kg Energy Calorie Requirements (kcal): 1264-6869  Weight Used For Protein Calculations: 105 kg (231 lb 7.7 oz)  Protein Requirements:   Estimated Fluid Requirement Method: RDA Method    RDA Method (mL): 2100       Nutrition by Nursing  Diet/Nutrition Received: consistent carb/diabetic diet (2000 moshe)  Intake (%): 100%        Last Bowel Movement: 06/05/24                Nutrition Follow-Up  RD Follow-up?: Yes      Nutrition Discharge Planning: home with family; continue consistent carb diet on d/c          Available via Secure Chat

## 2024-06-07 VITALS
DIASTOLIC BLOOD PRESSURE: 60 MMHG | WEIGHT: 220.88 LBS | HEIGHT: 73 IN | SYSTOLIC BLOOD PRESSURE: 95 MMHG | TEMPERATURE: 98 F | HEART RATE: 90 BPM | BODY MASS INDEX: 29.27 KG/M2 | OXYGEN SATURATION: 97 % | RESPIRATION RATE: 18 BRPM

## 2024-06-07 LAB — GLUCOSE SERPL-MCNC: 116 MG/DL (ref 70–105)

## 2024-06-07 PROCEDURE — S0179 MEGESTROL 20 MG: HCPCS | Performed by: FAMILY MEDICINE

## 2024-06-07 PROCEDURE — 94761 N-INVAS EAR/PLS OXIMETRY MLT: CPT

## 2024-06-07 PROCEDURE — 25000003 PHARM REV CODE 250: Performed by: FAMILY MEDICINE

## 2024-06-07 PROCEDURE — 99315 NF DSCHRG MGMT 30 MIN/LESS: CPT | Mod: ,,, | Performed by: FAMILY MEDICINE

## 2024-06-07 PROCEDURE — 82962 GLUCOSE BLOOD TEST: CPT

## 2024-06-07 RX ORDER — METFORMIN HYDROCHLORIDE 500 MG/1
500 TABLET ORAL 2 TIMES DAILY WITH MEALS
Qty: 180 TABLET | Refills: 3 | Status: SHIPPED | OUTPATIENT
Start: 2024-06-07 | End: 2025-06-07

## 2024-06-07 RX ORDER — POTASSIUM CHLORIDE 20 MEQ/1
20 TABLET, EXTENDED RELEASE ORAL 2 TIMES DAILY
Qty: 60 TABLET | Refills: 0 | Status: SHIPPED | OUTPATIENT
Start: 2024-06-07

## 2024-06-07 RX ADMIN — METFORMIN HYDROCHLORIDE 500 MG: 500 TABLET, FILM COATED ORAL at 08:06

## 2024-06-07 RX ADMIN — DORZOLAMIDE HCL 1 DROP: 20 SOLUTION/ DROPS OPHTHALMIC at 08:06

## 2024-06-07 RX ADMIN — POTASSIUM CHLORIDE 20 MEQ: 1500 TABLET, EXTENDED RELEASE ORAL at 08:06

## 2024-06-07 RX ADMIN — CHOLECALCIFEROL TAB 125 MCG (5000 UNIT) 5000 UNITS: 125 TAB at 08:06

## 2024-06-07 RX ADMIN — ASPIRIN 81 MG: 81 TABLET, COATED ORAL at 08:06

## 2024-06-07 RX ADMIN — MEGESTROL ACETATE 400 MG: 400 SUSPENSION ORAL at 08:06

## 2024-06-07 RX ADMIN — PIOGLITAZONE 15 MG: 15 TABLET ORAL at 08:06

## 2024-06-07 NOTE — DISCHARGE SUMMARY
[facility]  Hospital Medicine  Discharge Summary      Patient Name: Milton Johnson  MRN: [id number]  KARLOS: [id number]  Patient Class: IP- Swing  Admission Date: 5/21/2024  Hospital Length of Stay: 17 days  Discharge Date and Time:  06/07/2024 8:15 AM  Attending Physician: Valentina Marin,*   Discharging Provider: Valentina Marin MD  Primary Care Provider: Dontrell Shetty MD    Primary Care Team: Networked reference to record PCT     HPI:   This 78 yr. Old WM fell at home and fractured his right hip. He underwent an ORIF of said fracture. He is here for therapy after surgery. He has hypertension, diabetes, hyperlipidemia, and hyponatremia. He was found to have a UTI while there. Culture is pending.    * No surgery found *      Hospital Course:   5/22/24 - pt. Is doing well so far. Voices no complaint. Will continue present treatment.    5/24/24 - pt. Is doing well. Eating. Now BS's are elevating. Orders revised.    5/28/24 - doing well. No complaints voiced. Will continue present treatment.    5/31/24 - Doing well. No complaints voiced. Will continue present treatment.    6/3/24 - not eating. Pt. Requesting something for appetite. Orders revised.    6/7/24 - pt. Is ready to go home. He is to see his PCP next week and get a referral for a GI consult concerning dysphagia to solid food. He will continue PT as an outpatient.     Goals of Care Treatment Preferences:  Code Status: Full Code      Consults:   Consults (From admission, onward)          Status Ordering Provider     Inpatient consult to Social Work  Once        Provider:  (Not yet assigned)    Acknowledged VALENTINA MARIN     Inpatient consult to Social Work  Once        Provider:  (Not yet assigned)    Acknowledged VALENTINA MARIN     Inpatient consult to Registered Dietitian/Nutritionist  Once        Provider:  (Not yet assigned)    Completed VALENTINA MARIN            No new  Assessment & Plan notes have been filed under this hospital service since the last note was generated.  Service: Hospital Medicine    Final Active Diagnoses:    Diagnosis Date Noted POA    PRINCIPAL PROBLEM:  Muscular weakness [M62.81] 05/21/2024 Yes      Problems Resolved During this Admission:       Discharged Condition: stable    Disposition:     Follow Up:    Patient Instructions:      Ambulatory referral/consult to Physical/Occupational Therapy   Standing Status: Future   Referral Priority: Routine Referral Type: Physical Medicine   Referral Reason: Specialty Services Required   Number of Visits Requested: 1       Significant Diagnostic Studies: Labs: All labs within the past 24 hours have been reviewed    Pending Diagnostic Studies:       None           Medications:  Reconciled Home Medications:      Medication List        START taking these medications      potassium chloride SA 20 MEQ tablet  Commonly known as: K-DUR,KLOR-CON  Take 1 tablet (20 mEq total) by mouth 2 (two) times daily.            CHANGE how you take these medications      metFORMIN 500 MG tablet  Commonly known as: GLUCOPHAGE  Take 1 tablet (500 mg total) by mouth 2 (two) times daily with meals.  What changed:   medication strength  how much to take            CONTINUE taking these medications      aspirin 81 MG EC tablet  Commonly known as: ECOTRIN  Take 81 mg by mouth 2 (two) times a day. For 30 days     bimatoprost 0.01 % Drop  Commonly known as: LUMIGAN  Place 1 drop into both eyes every evening.     carvediloL 3.125 MG tablet  Commonly known as: COREG  Take 3.125 mg by mouth 2 (two) times daily with meals.     cholecalciferol (vitamin D3) 125 mcg (5,000 unit) Tbdl  Take 5,000 Units by mouth once daily.     dorzolamide 2 % ophthalmic solution  Commonly known as: TRUSOPT  Place 1 drop into both eyes 2 (two) times a day.     insulin glargine (TOUJEO) 300 unit/mL (1.5 mL) Inpn pen  Commonly known as: TOUJEO SOLOSTAR U-300 INSULIN  Inject 30  "Units into the skin every evening.     losartan-hydrochlorothiazide 100-25 mg 100-25 mg per tablet  Commonly known as: HYZAAR  Take 1 tablet by mouth once daily.     pen needle, diabetic 31 gauge x 1/4" Ndle  Inject 2 each into the skin 2 (two) times a day.     pioglitazone 15 MG tablet  Commonly known as: ACTOS  Take 1 tablet (15 mg total) by mouth once daily.     rosuvastatin 40 MG Tab  Commonly known as: CRESTOR  Take 40 mg by mouth every evening.     VICTOZA 3-LORI 0.6 mg/0.1 mL (18 mg/3 mL) Pnij pen  Generic drug: liraglutide 0.6 mg/0.1 mL (18 mg/3 mL) subq PNIJ  Inject 1.8 mg into the skin once daily.            STOP taking these medications      amLODIPine 10 MG tablet  Commonly known as: NORVASC              Indwelling Lines/Drains at time of discharge:   Lines/Drains/Airways       None                   Time spent on the discharge of patient: 30 minutes         Valentina Walker MD  Department of Hospital Medicine  [facility]  "

## 2024-06-07 NOTE — PLAN OF CARE
[facility]  Discharge Final Note    Primary Care Provider: Dontrell Shetty MD    Expected Discharge Date: 6/7/2024    Final Discharge Note (most recent)       Final Note - 06/07/24 0834          Final Note    Assessment Type Final Discharge Note (P)      Anticipated Discharge Disposition Home or Self Care (P)      What phone number can be called within the next 1-3 days to see how you are doing after discharge? [phone] (P)      Hospital Resources/Appts/Education Provided Provided patient/caregiver with written discharge plan information;Provided education on problems/symptoms using teachback;Appointments scheduled and added to AVS (P)         Post-Acute Status    Coverage [employer] (P)      Discharge Delays None known at this time (P)                      Important Message from Medicare      Pt is discharging home and has outpt therapy scheduled. Pt also has a new rolling walker and bedside commode.

## 2024-06-07 NOTE — NURSING
No change in pt status this shift. Pt in chair, no complaints voiced. Nadn. Glasses on for vision,pt watching tv. Safety measures intact, call bell within reach. Water,cell phone, tablet on bedside table within patients reach. Walker noted for ambulation. Pt encourage to call for assistance.

## 2024-06-07 NOTE — NURSING
Received pt in bed, with glasses on watching tv. Alert and orient. Speech clear. Denies episodes of difficulty swallow today. Denies pain and discomfort. States lbm was today. Dsgs x3 to right hip dry and intact. Change due on 6/11/2024 , discoloration from an old bruise noted around incision yellow/pink/maroon. Trace of edema noted to BLE. Wolfgang hose removed at patient request, education done on the importance of wearing wolfgang hose, understanding voiced. Bed low, pt refused bed alarm, states he will call for assistance if need. Water,call bell within reach. Walker noted for ambulation. Tablet and cell phone on bedside table next to patient for use if needed. Room clutter free.

## 2024-06-07 NOTE — PLAN OF CARE
Problem: Adult Inpatient Plan of Care  Goal: Readiness for Transition of Care  Outcome: Progressing  Intervention: Mutually Develop Transition Plan  Flowsheets (Taken 6/7/2024 1342)  Equipment Currently Used at Home:   walker, rolling   bedside commode  Transportation Anticipated: family or friend will provide  Communicated ARACELI with patient/caregiver: Yes  Do you expect to return to your current living situation?: Yes  Do you have help at home or someone to help you manage your care at home?: Yes  Readmission within 30 days?: No  Do you currently have service(s) that help you manage your care at home?: No     Problem: Diabetes Comorbidity  Goal: Blood Glucose Level Within Targeted Range  Outcome: Progressing  Intervention: Monitor and Manage Glycemia  Flowsheets (Taken 6/7/2024 1342)  Glycemic Management:   blood glucose monitored   oral hydration promoted   oral glucose given     Problem: Adult Inpatient Plan of Care  Goal: Plan of Care Review  Outcome: Met  Flowsheets (Taken 6/7/2024 1342)  Plan of Care Reviewed With: patient  Goal: Patient-Specific Goal (Individualized)  Outcome: Met  Goal: Absence of Hospital-Acquired Illness or Injury  Outcome: Met  Goal: Optimal Comfort and Wellbeing  Outcome: Met

## 2024-06-07 NOTE — NURSING
Discharged home with wife via private vehicle. All home medications sent home with wife at this time.

## 2024-06-07 NOTE — PLAN OF CARE
Problem: Diabetes Comorbidity  Goal: Blood Glucose Level Within Targeted Range  Outcome: Not Progressing     Problem: Pain Acute  Goal: Optimal Pain Control and Function  Outcome: Not Progressing     Problem: Fall Injury Risk  Goal: Absence of Fall and Fall-Related Injury  Outcome: Not Progressing     Problem: VTE (Venous Thromboembolism)  Goal: Tissue Perfusion  Outcome: Not Progressing   Pt educated on the importance of wearing wolfgang hose and the use of the bed alarm. Understanding voiced pt continue to refuse for this nurse to reapply wolfgang hose and turn bed alarm on. Pt states he will call for assistance of needed.

## 2024-06-10 NOTE — PLAN OF CARE
Post d/c follow up call: spoke with pt's wife who states patient is doing well and starts outpatient therapy on 6/12/24.

## 2024-06-11 ENCOUNTER — PATIENT OUTREACH (OUTPATIENT)
Dept: ADMINISTRATIVE | Facility: CLINIC | Age: 79
End: 2024-06-11

## 2024-06-11 ENCOUNTER — OFFICE VISIT (OUTPATIENT)
Dept: FAMILY MEDICINE | Facility: CLINIC | Age: 79
End: 2024-06-11
Payer: MEDICARE

## 2024-06-11 VITALS
TEMPERATURE: 98 F | HEIGHT: 73 IN | DIASTOLIC BLOOD PRESSURE: 56 MMHG | BODY MASS INDEX: 28.36 KG/M2 | WEIGHT: 214 LBS | OXYGEN SATURATION: 98 % | HEART RATE: 93 BPM | SYSTOLIC BLOOD PRESSURE: 98 MMHG

## 2024-06-11 DIAGNOSIS — N18.31 TYPE 2 DIABETES MELLITUS WITH STAGE 3A CHRONIC KIDNEY DISEASE, WITH LONG-TERM CURRENT USE OF INSULIN: ICD-10-CM

## 2024-06-11 DIAGNOSIS — E11.22 TYPE 2 DIABETES MELLITUS WITH STAGE 3A CHRONIC KIDNEY DISEASE, WITH LONG-TERM CURRENT USE OF INSULIN: ICD-10-CM

## 2024-06-11 DIAGNOSIS — S72.001S CLOSED FRACTURE OF RIGHT HIP, SEQUELA: ICD-10-CM

## 2024-06-11 DIAGNOSIS — Z79.4 TYPE 2 DIABETES MELLITUS WITH STAGE 3A CHRONIC KIDNEY DISEASE, WITH LONG-TERM CURRENT USE OF INSULIN: ICD-10-CM

## 2024-06-11 DIAGNOSIS — I10 HYPERTENSION, UNSPECIFIED TYPE: Primary | ICD-10-CM

## 2024-06-11 PROCEDURE — 3074F SYST BP LT 130 MM HG: CPT | Mod: ,,, | Performed by: FAMILY MEDICINE

## 2024-06-11 PROCEDURE — 99214 OFFICE O/P EST MOD 30 MIN: CPT | Mod: ,,, | Performed by: FAMILY MEDICINE

## 2024-06-11 PROCEDURE — 3288F FALL RISK ASSESSMENT DOCD: CPT | Mod: ,,, | Performed by: FAMILY MEDICINE

## 2024-06-11 PROCEDURE — 1100F PTFALLS ASSESS-DOCD GE2>/YR: CPT | Mod: ,,, | Performed by: FAMILY MEDICINE

## 2024-06-11 PROCEDURE — 3078F DIAST BP <80 MM HG: CPT | Mod: ,,, | Performed by: FAMILY MEDICINE

## 2024-06-11 PROCEDURE — 1111F DSCHRG MED/CURRENT MED MERGE: CPT | Mod: ,,, | Performed by: FAMILY MEDICINE

## 2024-06-11 RX ORDER — NYSTATIN 100000 [USP'U]/G
POWDER TOPICAL 4 TIMES DAILY
Qty: 60 G | Refills: 0 | Status: SHIPPED | OUTPATIENT
Start: 2024-06-11

## 2024-06-11 RX ORDER — CYPROHEPTADINE HYDROCHLORIDE 4 MG/1
4 TABLET ORAL 3 TIMES DAILY
Qty: 90 TABLET | Refills: 0 | Status: SHIPPED | OUTPATIENT
Start: 2024-06-11

## 2024-06-11 RX ORDER — FLUCONAZOLE 50 MG/1
50 TABLET ORAL DAILY
Qty: 10 TABLET | Refills: 0 | Status: SHIPPED | OUTPATIENT
Start: 2024-06-11 | End: 2024-06-21

## 2024-06-11 NOTE — PROGRESS NOTES
C3 nurse spoke with Milton Johnson  for a TCC post hospital discharge follow up call. The patient has a scheduled HOS appointment with Dontrell Shetty MD  on 6/11/24 @ 140. Patient unable to review medications during call. Patient requested a callback tomorrow to review medications with his wife.

## 2024-06-11 NOTE — PROGRESS NOTES
Tee Shetty MD   [facility]  [address] [place] [address]     PATIENT NAME: Milton Johnson  : 1945  DATE: 24  MRN: [id number]      Billing Provider: Dontrell Shetty MD  Level of Service: [place] OFFICE/OUTPT VISIT, EST, LEVL IV, 30-39 MIN  Patient PCP Information       Provider PCP Type    Dontrell Shetty MD General            Reason for Visit / Chief Complaint: Transitional Care (TCM follow up. Fell on 24 and was transported from [facility] to [facility] in [place] with closed fracture trochanter right femur. ORIF right hip done 24 by Dr Velasco. Admitted to [facility] at [facility] on 24 and discharged on 24. He will begin outpatient PT tomorrow and will follow up with Dr Velasco next week.), Rash (Rash bilateral axilla x 1 week. Denies itching or burning. Reports red area to coccyx that is painful.), Hypotension (Low BP for over one week. States losartan/HCTZ on hold since 24.), and Anorexia (Loss of appetite, weight loss. Wife states this has been going on for a while before his fall.)         History of Present Illness / Problem Focused Workflow     Milton Johnson presents to the clinic with Transitional Care (TCM follow up. Fell on 24 and was transported from [facility] to [facility] in [place] with closed fracture trochanter right femur. ORIF right hip done 24 by Dr Velasco. Admitted to [facility] at [facility] on 24 and discharged on 24. He will begin outpatient PT tomorrow and will follow up with Dr Velasco next week.), Rash (Rash bilateral axilla x 1 week. Denies itching or burning. Reports red area to coccyx that is painful.), Hypotension (Low BP for over one week. States losartan/HCTZ on hold since 24.), and Anorexia (Loss of appetite, weight loss. Wife states this has been going on for a while before his fall.)     HPI    Review of Systems     Review  of Systems   Constitutional:  Negative for activity change, appetite change, fatigue and fever.   HENT:  Negative for nasal congestion, ear pain, hearing loss, sinus pressure/congestion and sore throat.    Respiratory:  Negative for cough, chest tightness and shortness of breath.    Cardiovascular:  Negative for chest pain and palpitations.   Gastrointestinal:  Negative for abdominal pain and fecal incontinence.   Genitourinary:  Negative for bladder incontinence, difficulty urinating and erectile dysfunction.   Musculoskeletal:  Negative for arthralgias.   Integumentary:  Negative for rash.   Neurological:  Negative for dizziness and headaches.        Medical / Social / Family History     Past Medical History:   Diagnosis Date    Diabetes mellitus     Diabetes mellitus, type 2     Glaucoma     High cholesterol     Hypertension        Past Surgical History:   Procedure Laterality Date    CATARACT EXTRACTION      EYE SURGERY         Social History  Milton Johnson  reports that he has never smoked. He has been exposed to tobacco smoke. He has never used smokeless tobacco. He reports current alcohol use of about 1.0 standard drink of alcohol per week. He reports that he does not use drugs.    Family History  Milton Johnson  family history is not on file. He was adopted.    Medications and Allergies     Medications  Outpatient Medications Marked as Taking for the 6/11/24 encounter (Office Visit) with Dontrell Shetty MD   Medication Sig Dispense Refill    aspirin (ECOTRIN) 81 MG EC tablet Take 81 mg by mouth 2 (two) times a day. For 30 days      bimatoprost (LUMIGAN) 0.01 % Drop Place 1 drop into both eyes every evening.      carvediloL (COREG) 3.125 MG tablet Take 3.125 mg by mouth 2 (two) times daily with meals.      cholecalciferol, vitamin D3, 125 mcg (5,000 unit) TbDL Take 5,000 Units by mouth once daily.      dorzolamide (TRUSOPT) 2 % ophthalmic solution Place 1 drop into both eyes 2 (two) times a day.       "insulin glargine, TOUJEO, (TOUJEO SOLOSTAR U-300 INSULIN) 300 unit/mL (1.5 mL) InPn pen Inject 30 Units into the skin every evening. 3 mL 2    liraglutide 0.6 mg/0.1 mL, 18 mg/3 mL, subq PNIJ (VICTOZA 3-LORI) 0.6 mg/0.1 mL (18 mg/3 mL) PnIj pen Inject 1.8 mg into the skin once daily. 27 mL 0    metFORMIN (GLUCOPHAGE) 500 MG tablet Take 1 tablet (500 mg total) by mouth 2 (two) times daily with meals. 180 tablet 3    pen needle, diabetic 31 gauge x 1/4" Ndle Inject 2 each into the skin 2 (two) times a day. 100 each 3    pioglitazone (ACTOS) 15 MG tablet Take 1 tablet (15 mg total) by mouth once daily. 90 tablet 0    potassium chloride SA (K-DUR,KLOR-CON) 20 MEQ tablet Take 1 tablet (20 mEq total) by mouth 2 (two) times daily. 60 tablet 0    rosuvastatin (CRESTOR) 40 MG Tab Take 40 mg by mouth every evening.         Allergies  Review of patient's allergies indicates:  No Known Allergies    Physical Examination     Vitals:    06/11/24 1356   BP: (!) 98/56   Pulse: 93   Temp: 98 °F (36.7 °C)     Physical Exam  Constitutional:       General: He is not in acute distress.     Appearance: He is not ill-appearing.   HENT:      Head: Normocephalic and atraumatic.      Right Ear: Tympanic membrane and ear canal normal.      Left Ear: Tympanic membrane and ear canal normal.      Nose: Nose normal. No congestion or rhinorrhea.   Eyes:      Pupils: Pupils are equal, round, and reactive to light.   Cardiovascular:      Rate and Rhythm: Normal rate and regular rhythm.      Pulses: Normal pulses.      Heart sounds: No murmur heard.  Pulmonary:      Effort: No respiratory distress.      Breath sounds: No wheezing, rhonchi or rales.   Abdominal:      General: Bowel sounds are normal.      Palpations: Abdomen is soft.      Tenderness: There is no abdominal tenderness.      Hernia: No hernia is present.   Musculoskeletal:         General: Tenderness (right hip and upper leg) and signs of injury present.      Cervical back: Normal range of " motion and neck supple.   Lymphadenopathy:      Cervical: No cervical adenopathy.   Skin:     General: Skin is warm and dry.   Neurological:      Mental Status: He is alert.   Psychiatric:         Behavior: Behavior normal.         Thought Content: Thought content normal.          Assessment and Plan (including Health Maintenance)   :    Plan:         Health Maintenance Due   Topic Date Due    Pneumococcal Vaccines (Age 65+) (1 of 2 - PCV) Never done    TETANUS VACCINE  Never done    Shingles Vaccine (1 of 2) Never done    RSV Vaccine (Age 60+ and Pregnant patients) (1 - 1-dose 60+ series) Never done    COVID-19 Vaccine (5 - 2023-24 season) 09/01/2023       Problem List Items Addressed This Visit          Cardiac/Vascular    Hypertension - Primary       Endocrine    Type 2 diabetes mellitus with stage 3a chronic kidney disease, with long-term current use of insulin     Other Visit Diagnoses       Closed fracture of right hip, sequela              Hypertension, unspecified type    Type 2 diabetes mellitus with stage 3a chronic kidney disease, with long-term current use of insulin    Closed fracture of right hip, sequela    Other orders  -     fluconazole (DIFLUCAN) 50 MG Tab; Take 1 tablet (50 mg total) by mouth once daily. for 10 days  Dispense: 10 tablet; Refill: 0  -     nystatin (MYCOSTATIN) powder; Apply topically 4 (four) times daily.  Dispense: 60 g; Refill: 0  -     cyproheptadine (PERIACTIN) 4 mg tablet; Take 1 tablet (4 mg total) by mouth 3 (three) times daily.  Dispense: 90 tablet; Refill: 0       Health Maintenance Topics with due status: Not Due       Topic Last Completion Date    Eye Exam 10/03/2023    Diabetes Urine Screening 10/19/2023    Lipid Panel 01/24/2024    Hemoglobin A1c 04/30/2024       Procedures     Future Appointments   Date Time Provider Department Center   6/19/2024  8:00 AM Katelynn Waddell PT [facility] REHAB [facility]   7/31/2024  8:20 AM Dontrell Shetty MD [facility]  [place]   2/12/2025  1:45 PM Daria Boyd, [facility] ZAYNAB [place]        No follow-ups on file.       Signature:  Dontrell Shetty MD  [facility]  [address] [facility] [place] [address]  [phone] Phone  [phone] Fax    Date of encounter: 6/11/24

## 2024-06-12 NOTE — PROGRESS NOTES
C3 nurse spoke with wife Tino Johnson to review medications. Medication review completed. Pt completed hospital follow up visit with PCP on 6/11/24.

## 2024-06-19 ENCOUNTER — CLINICAL SUPPORT (OUTPATIENT)
Dept: REHABILITATION | Facility: HOSPITAL | Age: 79
End: 2024-06-19
Attending: FAMILY MEDICINE
Payer: MEDICARE

## 2024-06-19 DIAGNOSIS — M62.81 MUSCULAR WEAKNESS: ICD-10-CM

## 2024-06-19 DIAGNOSIS — M25.551 RIGHT HIP PAIN: Primary | ICD-10-CM

## 2024-06-19 PROCEDURE — 97110 THERAPEUTIC EXERCISES: CPT

## 2024-06-19 PROCEDURE — 97161 PT EVAL LOW COMPLEX 20 MIN: CPT

## 2024-06-19 NOTE — PLAN OF CARE
"  [facility]   Physical Therapy Initial Evaluation      Name: Milton Johnson  Clinic Number: [id number]    Therapy Diagnosis:   Encounter Diagnoses   Name Primary?    Muscular weakness     Right hip pain Yes        Physician: Valentina Walker*    Physician Orders: PT Eval and Treat   Medical Diagnosis from Referral: muscular weakness   Evaluation Date: 6/19/2024  Authorization Period Expiration: 6/5/2025   Plan of Care Expiration: 8/2/2024  Progress Note Due: 8/2/2024  Date of Surgery: 5/17/2024  Visit # / Visits authorized: 1/12   FOTO: to be completed on visit 6     Precautions: Standard     Time In: 7:55  Time Out: 8:38  Total Billable Time: 45 minutes    Subjective     Date of onset: 5/17/2024    History of current condition - Milton reports: Patient fell and fractured his R femur. He had an ORIF of the R femur on 5/17/2024 and stayed in inpatient rehab for three weeks following surgery. Patient reports that he has been doing well since he discharged home. He reports difficulty picking up objects from the floor and putting his socks on and off at this time.     Falls: Fall on 5/17/2024 resulting in fracture     Imaging: X-rays    Prior Therapy: Patient completed swing bed rehabilitation in this facility following surgery   Social History:  lives with their spouse  Occupation: Retired   Prior Level of Function: Independent   Current Level of Function: Modified Independent     Pain:  Current 0/10, worst 4/10, best 0/10   Location: right hip     Description: Aching and Throbbing  Aggravating Factors: increased activities   Easing Factors: pain medication    Patients goals: "to get back going"     Medical History:   Past Medical History:   Diagnosis Date    Diabetes mellitus     Diabetes mellitus, type 2     Glaucoma     High cholesterol     Hypertension        Surgical History:   Milton Johnson  has a past surgical history that includes Cataract extraction and Eye " surgery.    Medications:   Milton has a current medication list which includes the following prescription(s): aspirin, bimatoprost, carvedilol, cholecalciferol (vitamin d3), cyproheptadine, dorzolamide, fluconazole, insulin glargine (toujeo), victoza 3-guero, losartan-hydrochlorothiazide 100-25 mg, metformin, nystatin, pen needle, diabetic, pioglitazone, potassium chloride sa, and rosuvastatin.    Allergies:   Review of patient's allergies indicates:  No Known Allergies     Objective      Range of motion:  Motion Right Left    Hip flexion  WITHIN FUNCTIONAL LIMITS  WITHIN FUNCTIONAL LIMITS   Hip extension  WITHIN FUNCTIONAL LIMITS  WITHIN FUNCTIONAL LIMITS   Hip abduction  WITHIN FUNCTIONAL LIMITS  WITHIN FUNCTIONAL LIMITS   Hip adduction  WITHIN FUNCTIONAL LIMITS  WITHIN FUNCTIONAL LIMITS   Knee extension  WITHIN FUNCTIONAL LIMITS  WITHIN FUNCTIONAL LIMITS   Knee flexion  WITHIN FUNCTIONAL LIMITS  WITHIN FUNCTIONAL LIMITS       Manual muscle test   Muscle Right  Left    Hip flexion  MMT strength: 3-/5  MMT strength: 4+/5   Hip extension  MMT strength: 3-/5  MMT strength: 4+/5   Hip abduction  MMT strength: 3-/5  MMT strength: 4+/5   Hip adduction  MMT strength: 3-/5  MMT strength: 4+/5   Knee extension  MMT strength: 3+/5  MMT strength: 4+/5   Knee flexion  MMT strength: 3+/5  MMT strength: 4+/5       Gait:  Weight bearing precautions: WBAT  Assistive device: rolling walker  Ambulation deviations: increased weight bearing on assistive device, decreased R stance time, decreased R heel strike and toe off   Stairs:Patient ambulates up and down stairs with a non-reciprocal pattern and use of B handrails.   Gait speed:1.44 feet per second     Clinical Special Tests:  5 x sit<>stand: Patient is unable to complete sit to stand transfer without UE use at this time.   Timed Up and Go : 36 seconds       Intake Outcome Measure for FOTO Survey    Therapist reviewed FOTO scores for Milton Johnson on 6/19/2024.   FOTO report -  "see Media section or FOTO account episode details.    Intake Score: 56%         Treatment     Total Treatment time (time-based codes) separate from Evaluation: 32 minutes     Milton received the treatments listed below:      therapeutic exercises to develop strength, endurance, ROM, and flexibility for 32 minutes including: See flowsheet below     Therapeutic-exercise  Reps    NuStep  5 minutes    Wedge  1 minute    Long Arc Quad  20 x 1.5 #    Hamstring Curls  20 x green TB    Quad Sets  20 x 3"    Heel Slides  20 x    Short Arc Quad  20 x 1.5 #    Straight leg raises  20 x    Bridge  10 x    Hip ADD  20 x 3"    Hip ABD  20 x green TB                    Patient Education and Home Exercises     Education provided:   - eval findings, plan of care and Home exercise program, proper sequencing for stair negotiation    Written Home Exercises Provided: yes. Exercises were reviewed and Milton was able to demonstrate them prior to the end of the session.  Milton demonstrated good  understanding of the education provided. See EMR under Patient Instructions for exercises provided during therapy sessions.    Assessment     Miltno is a 78 y.o. male referred to outpatient Physical Therapy with a medical diagnosis of muscle weakness. Patient presents with R lower extremity pain, decreased R lower extremity muscle strength and impaired motor control. Patient's impairments are currently limiting his activity tolerance and his overall functional mobility.     PT provided patient with visual demonstration and verbal instruction for proper sequencing to negotiate up and down stairs with a non-reciprocal pattern leading with L LE when ascending an R LE during descent. PT also provided patient with visual, verbal and tactile cueing throughout treatment session for proper LE alignment, form, speed, and decreased compensations with exercises. Patient had no reports of increased pain or adverse effects to treatment tasks.     Patient " prognosis is Good.   Patient will benefit from skilled outpatient Physical Therapy to address the deficits stated above and in the chart below, provide patient /family education, and to maximize patientt's level of independence.     Plan of care discussed with patient: Yes  Patient's spiritual, cultural and educational needs considered and patient is agreeable to the plan of care and goals as stated below:     Anticipated Barriers for therapy: compliance with Home exercise program     Medical Necessity is demonstrated by the following  History  Co-morbidities and personal factors that may impact the plan of care [x] LOW: no personal factors / co-morbidities  [] MODERATE: 1-2 personal factors / co-morbidities  [] HIGH: 3+ personal factors / co-morbidities    Moderate / High Support Documentation:   Co-morbidities affecting plan of care: NA    Personal Factors:   no deficits     Examination  Body Structures and Functions, activity limitations and participation restrictions that may impact the plan of care [x] LOW: addressing 1-2 elements  [] MODERATE: 3+ elements  [] HIGH: 4+ elements (please support below)    Moderate / High Support Documentation: NA     Clinical Presentation [x] LOW: stable  [] MODERATE: Evolving  [] HIGH: Unstable     Decision Making/ Complexity Score: low       Goals:  Short Term Goals: 3 weeks   Patient will independently complete Home exercise program with correct form.   Patient will independently ambulate community distances with a straight cane for increased independence with functional mobility.   Patient will complete sit to stand transfer with no UE use for increased independence with functional transfers.     Long Term Goals: 6 weeks   Patient will have increased R LE muscle strength to greater than or equal to 4/5 for improved gait mechanics.   Patient will negotiate up and down stairs with a non reciprocal pattern promoting return to PLOF.   Patient will a ambulate with increased gait  speed to greater than or equal to 2.0 feet per second for increased safety with community ambulation.   Patient will complete Timed up and Go in less than or equal to 20 seconds for increased safety with functional mobility.   Plan     Plan of care Certification: 6/19/2024 to 8/2/2024.    Outpatient Physical Therapy 2 times weekly for 6 weeks to include the following interventions: Gait Training, Manual Therapy, Moist Heat/ Ice, Neuromuscular Re-ed, Patient Education, Therapeutic Activities, and Therapeutic Exercise.     Katelynn Waddell PT, DPT         Physician's Signature: _________________________________________ Date: ________________

## 2024-06-21 ENCOUNTER — CLINICAL SUPPORT (OUTPATIENT)
Dept: REHABILITATION | Facility: HOSPITAL | Age: 79
End: 2024-06-21
Payer: MEDICARE

## 2024-06-21 DIAGNOSIS — M25.551 RIGHT HIP PAIN: Primary | ICD-10-CM

## 2024-06-21 PROCEDURE — 97110 THERAPEUTIC EXERCISES: CPT | Mod: CQ

## 2024-06-21 NOTE — PROGRESS NOTES
"[facility]   Physical Therapy Treatment Note      Name: Milton Johnson  Clinic Number: [id number]    Therapy Diagnosis: No diagnosis found.  Physician: Valentina Walker*    Visit Date: 6/21/2024      Physician Orders: PT Eval and Treat   Medical Diagnosis from Referral: muscular weakness   Evaluation Date: 6/19/2024  Authorization Period Expiration: 6/5/2025   Plan of Care Expiration: 8/2/2024  Progress Note Due: 8/2/2024  Date of Surgery: 5/17/2024  Visit # / Visits authorized: 2/12   FOTO: to be completed on visit 6      Precautions: Standard      Time In: 8:04  Time Out: 8:45  Total Billable Time: 41 minutes    PTA Visit #: 1/5       Subjective     Patient reports: "I feel okay, I'm not having any pain."    He was compliant with home exercise program.  Response to previous treatment: post tx soreness  Functional change: Too early in Plan of Care     Pain: 0/10  Location: bilateral legs     Objective      Objective Measures updated at progress report unless specified.     Treatment     Milton received the treatments listed below:      therapeutic exercises to develop strength, endurance, ROM, and flexibility for 41 minutes including: See flowsheet below      Therapeutic-exercise  Reps    NuStep  8 minutes    Wedge  2 minute    Long Arc Quad  20 x 1.5 #    Hamstring Curls  20 x green TB    Quad Sets  20 x 3"    Heel Slides  20 x    Short Arc Quad  20 x 1.5 #    Straight leg raises  20 x    Bridge  10 x    Hip ADD  20 x 3"    Hip ABD  20 x green TB                              Patient Education and Home Exercises       Education provided:   - Plan of Care, Home exercise program, Delayed onset muscle soreness     Written Home Exercises Provided: Patient instructed to cont prior HEP. Exercises were reviewed and Milton was able to demonstrate them prior to the end of the session.  Milton demonstrated good  understanding of the education provided. See Electronic Medical Record under " Patient Instructions for exercises provided during therapy sessions    Assessment   Milton is a 78 y.o. male referred to outpatient Physical Therapy with a medical diagnosis of muscle weakness. Patient presents with R lower extremity pain, decreased R lower extremity muscle strength and impaired motor control. Patient's impairments are currently limiting his activity tolerance and his overall functional mobility. LPTA provided pt with proper setup on NuStep with resistance to improve strength and decrease stiffness prior to tx. Pt required moderated visual and verbal cues to progress through tx with proper form, count, hold times, and decreased compensations. Pt displays some quad lag during straight leg raises with left lower extremity. No adverse effects noted.      Patient prognosis is Good.   Patient will benefit from skilled outpatient Physical Therapy to address the deficits stated above and in the chart below, provide patient /family education, and to maximize patientt's level of independence.      Plan of care discussed with patient: Yes  Patient's spiritual, cultural and educational needs considered and patient is agreeable to the plan of care and goals as stated below:      Anticipated Barriers for therapy: compliance with Home exercise program     Goals:  Short Term Goals: 3 weeks   Patient will independently complete Home exercise program with correct form.   Patient will independently ambulate community distances with a straight cane for increased independence with functional mobility.   Patient will complete sit to stand transfer with no UE use for increased independence with functional transfers.      Long Term Goals: 6 weeks   Patient will have increased R LE muscle strength to greater than or equal to 4/5 for improved gait mechanics.   Patient will negotiate up and down stairs with a non reciprocal pattern promoting return to PLOF.   Patient will a ambulate with increased gait speed to greater than  or equal to 2.0 feet per second for increased safety with community ambulation.   Patient will complete Timed up and Go in less than or equal to 20 seconds for increased safety with functional mobility.     Plan   Plan of care Certification: 6/19/2024 to 8/2/2024.     Outpatient Physical Therapy 2 times weekly for 6 weeks to include the following interventions: Gait Training, Manual Therapy, Moist Heat/ Ice, Neuromuscular Re-ed, Patient Education, Therapeutic Activities, and Therapeutic Exercise.     Continue POC per PT orders to progress patient toward rehab goals.    ROSALVA Ohara  6/21/2024

## 2024-06-25 ENCOUNTER — CLINICAL SUPPORT (OUTPATIENT)
Dept: REHABILITATION | Facility: HOSPITAL | Age: 79
End: 2024-06-25
Payer: MEDICARE

## 2024-06-25 DIAGNOSIS — M25.551 RIGHT HIP PAIN: Primary | ICD-10-CM

## 2024-06-25 PROCEDURE — 97530 THERAPEUTIC ACTIVITIES: CPT

## 2024-06-25 PROCEDURE — 97110 THERAPEUTIC EXERCISES: CPT

## 2024-06-25 NOTE — PROGRESS NOTES
"[facility]   Physical Therapy Treatment Note      Name: Milton Johnson  Clinic Number: [id number]    Therapy Diagnosis: No diagnosis found.  Physician: Valentina Walker*    Visit Date: 6/25/2024      Physician Orders: PT Eval and Treat   Medical Diagnosis from Referral: muscular weakness   Evaluation Date: 6/19/2024  Authorization Period Expiration: 6/5/2025   Plan of Care Expiration: 8/2/2024  Progress Note Due: 8/2/2024  Date of Surgery: 5/17/2024  Visit # / Visits authorized: 3/12   FOTO: to be completed on visit 6      Precautions: Standard      Time In: 7:58  Time Out: 8:46  Total Billable Time:  48 minutes    PTA Visit #: 0/5       Subjective     Patient reports: "I feel okay, I'm not having any pain."    He was compliant with home exercise program.  Response to previous treatment: post tx soreness  Functional change: Too early in Plan of Care     Pain: 0/10  Location: bilateral legs     Objective      Objective Measures updated at progress report unless specified.     Treatment     Milton received the treatments listed below:      Therapeutic exercises to develop strength, endurance, ROM, and flexibility for 36 minutes including: See flowsheet below   Therapeutic activities to improve functional performance for 12 minutes including: See flowsheet below      Therapeutic-exercise  Reps    NuStep  8 minutes    Wedge  2 minute    Long Arc Quad  20 x 1.5 #    Hamstring Curls  20 x green TB    Quad Sets  20 x 3"    Heel Slides  30 x    Short Arc Quad  30 x 1.5 #    Straight leg raises  20 x    Bridge  10 x    Hip ADD  20 x 3"    Hip ABD  20 x green TB                 Therapeutic-Exercise  Reps        Heel Raises  2 x 10    Standing Marching  2 x 10    Mini Squats  10 x    Sit to stands  3 x 5 reps            Patient Education and Home Exercises       Education provided:   - Plan of Care, Home exercise program, Delayed onset muscle soreness     Written Home Exercises Provided: " Patient instructed to cont prior HEP. Exercises were reviewed and Milton was able to demonstrate them prior to the end of the session.  Milton demonstrated good  understanding of the education provided. See Electronic Medical Record under Patient Instructions for exercises provided during therapy sessions    Assessment   Milton is a 78 y.o. male referred to outpatient Physical Therapy with a medical diagnosis of muscle weakness. Patient presents with R lower extremity pain, decreased R lower extremity muscle strength and impaired motor control. Patient's impairments are currently limiting his activity tolerance and his overall functional mobility. PT provided pt with proper setup on NuStep with resistance to improve strength and decrease stiffness prior to tx. PT initiated standing functional strengthening tasks to improve endurance and and functional mobility. Patient required visual, verbal and tactile cueing throughout treatment for proper LE alignment, form, speed, and increased eccentric control during exercises. Patient had no reports of adverse effects to treatment tasks.       Patient prognosis is Good.   Patient will benefit from skilled outpatient Physical Therapy to address the deficits stated above and in the chart below, provide patient /family education, and to maximize patientt's level of independence.      Plan of care discussed with patient: Yes  Patient's spiritual, cultural and educational needs considered and patient is agreeable to the plan of care and goals as stated below:      Anticipated Barriers for therapy: compliance with Home exercise program     Goals:  Short Term Goals: 3 weeks   Patient will independently complete Home exercise program with correct form.   Patient will independently ambulate community distances with a straight cane for increased independence with functional mobility.   Patient will complete sit to stand transfer with no UE use for increased independence with  functional transfers.      Long Term Goals: 6 weeks   Patient will have increased R LE muscle strength to greater than or equal to 4/5 for improved gait mechanics.   Patient will negotiate up and down stairs with a non reciprocal pattern promoting return to PLOF.   Patient will a ambulate with increased gait speed to greater than or equal to 2.0 feet per second for increased safety with community ambulation.   Patient will complete Timed up and Go in less than or equal to 20 seconds for increased safety with functional mobility.     Plan   Plan of care Certification: 6/19/2024 to 8/2/2024.     Outpatient Physical Therapy 2 times weekly for 6 weeks to include the following interventions: Gait Training, Manual Therapy, Moist Heat/ Ice, Neuromuscular Re-ed, Patient Education, Therapeutic Activities, and Therapeutic Exercise.     Continue POC per PT orders to progress patient toward rehab goals.    Katelynn Waddell, PT, DPT     6/25/2024

## 2024-06-27 ENCOUNTER — CLINICAL SUPPORT (OUTPATIENT)
Dept: REHABILITATION | Facility: HOSPITAL | Age: 79
End: 2024-06-27
Payer: MEDICARE

## 2024-06-27 DIAGNOSIS — M25.551 RIGHT HIP PAIN: Primary | ICD-10-CM

## 2024-06-27 PROCEDURE — 97530 THERAPEUTIC ACTIVITIES: CPT | Mod: CQ

## 2024-06-27 PROCEDURE — 97110 THERAPEUTIC EXERCISES: CPT | Mod: CQ

## 2024-06-27 NOTE — PROGRESS NOTES
"[facility]   Physical Therapy Treatment Note      Name: Milton Johnson  Clinic Number: [id number]    Therapy Diagnosis: No diagnosis found.  Physician: Valentina Walker*    Visit Date: 6/27/2024      Physician Orders: PT Eval and Treat   Medical Diagnosis from Referral: muscular weakness   Evaluation Date: 6/19/2024  Authorization Period Expiration: 6/5/2025   Plan of Care Expiration: 8/2/2024  Progress Note Due: 8/2/2024  Date of Surgery: 5/17/2024  Visit # / Visits authorized: 4/12   FOTO: to be completed on visit 6      Precautions: Standard      Time In: 8:04  Time Out: 8:47  Total Billable Time:  43 minutes     PTA Visit #: 1/5       Subjective     Patient reports: He is doing okay at home, and continues to use to rolling walker with ambulation.  Patient's chief complaint is of LE weakness.     He was compliant with home exercise program.  Response to previous treatment: post tx soreness  Functional change: Too early in Plan of Care     Pain: 0/10  Location: bilateral legs     Objective      Objective Measures updated at progress report unless specified.     Treatment     Milton received the treatments listed below:      Therapeutic exercises to develop strength, endurance, ROM, and flexibility for 32 minutes including: See flowsheet below   Therapeutic activities to improve functional performance for 11 minutes including: See flowsheet below      Therapeutic-exercise  Reps    NuStep  8 minutes    Wedge  2 minute    Long Arc Quad  20 x 1.5 #    Hamstring Curls  20 x green TB    Quad Sets  20 x 3"    Heel Slides  30 x    Short Arc Quad  30 x 1.5 #    Straight leg raises  20 x    Bridge  10 x    Hip ADD  20 x 3"    Hip ABD  20 x green TB                 Therapeutic-Exercise  Reps        Heel Raises  2 x 10    Standing Marching  2 x 10    Mini Squats  10 x    Sit to stands  3 x 5 reps            Patient Education and Home Exercises       Education provided:   - Plan of Care, Home " exercise program, Delayed onset muscle soreness     Written Home Exercises Provided: Patient instructed to cont prior HEP. Exercises were reviewed and Milton was able to demonstrate them prior to the end of the session.  Milton demonstrated good  understanding of the education provided. See Electronic Medical Record under Patient Instructions for exercises provided during therapy sessions    Assessment   Milton is a 78 y.o. male referred to outpatient Physical Therapy with a medical diagnosis of muscle weakness. Patient presents with R lower extremity pain, decreased R lower extremity muscle strength and impaired motor control. Patient's impairments are currently limiting his activity tolerance and his overall functional mobility. LPTA  provided pt with proper setup on NuStep with resistance to improve strength and decrease stiffness prior to tx.  Patient required visual, verbal and tactile cueing throughout treatment for proper LE alignment, form, speed, and increased eccentric control during exercises.  Patient reports muscle fatigue and requires seated rest breaks through progression of therapeutic activities.  Patient demonstrates greatest difficulty completing supine SLRs without Quad lag.    Patient had no reports of adverse effects to treatment tasks.       Patient prognosis is Good.   Patient will benefit from skilled outpatient Physical Therapy to address the deficits stated above and in the chart below, provide patient /family education, and to maximize patientt's level of independence.      Plan of care discussed with patient: Yes  Patient's spiritual, cultural and educational needs considered and patient is agreeable to the plan of care and goals as stated below:      Anticipated Barriers for therapy: compliance with Home exercise program     Goals:  Short Term Goals: 3 weeks   Patient will independently complete Home exercise program with correct form.   Patient will independently ambulate community  distances with a straight cane for increased independence with functional mobility.   Patient will complete sit to stand transfer with no UE use for increased independence with functional transfers.      Long Term Goals: 6 weeks   Patient will have increased R LE muscle strength to greater than or equal to 4/5 for improved gait mechanics.   Patient will negotiate up and down stairs with a non reciprocal pattern promoting return to PLOF.   Patient will a ambulate with increased gait speed to greater than or equal to 2.0 feet per second for increased safety with community ambulation.   Patient will complete Timed up and Go in less than or equal to 20 seconds for increased safety with functional mobility.     Plan   Plan of care Certification: 6/19/2024 to 8/2/2024.     Outpatient Physical Therapy 2 times weekly for 6 weeks to include the following interventions: Gait Training, Manual Therapy, Moist Heat/ Ice, Neuromuscular Re-ed, Patient Education, Therapeutic Activities, and Therapeutic Exercise.     Continue POC per PT orders to progress patient toward rehab goals.    ROSALVA Alcantara     6/27/2024

## 2024-07-01 ENCOUNTER — CLINICAL SUPPORT (OUTPATIENT)
Dept: REHABILITATION | Facility: HOSPITAL | Age: 79
End: 2024-07-01
Payer: MEDICARE

## 2024-07-01 DIAGNOSIS — M62.81 MUSCULAR WEAKNESS: ICD-10-CM

## 2024-07-01 DIAGNOSIS — M25.551 RIGHT HIP PAIN: Primary | ICD-10-CM

## 2024-07-01 PROCEDURE — 97530 THERAPEUTIC ACTIVITIES: CPT | Mod: CQ

## 2024-07-01 PROCEDURE — 97110 THERAPEUTIC EXERCISES: CPT | Mod: CQ

## 2024-07-01 NOTE — PROGRESS NOTES
"[facility]   Physical Therapy Treatment Note      Name: Milton Johnson  Clinic Number: [id number]    Therapy Diagnosis: No diagnosis found.  Physician: Valentina Walker    Visit Date: 7/1/2024      Physician Orders: PT Eval and Treat   Medical Diagnosis from Referral: muscular weakness   Evaluation Date: 6/19/2024  Authorization Period Expiration: 6/5/2025   Plan of Care Expiration: 8/2/2024  Progress Note Due: 8/2/2024  Date of Surgery: 5/17/2024  Visit # / Visits authorized: 5/12   FOTO: to be completed on visit 6      Precautions: Standard      Time In: 8:02  Time Out: 8:46  Total Billable Time:  44 minutes     PTA Visit #: 2/5      Subjective     Patient reports: "I'm doing okay!" And does not report new complaints upon arrival to PT treatment session.  Patient arrives ambulating with RW.       He was compliant with home exercise program.  Response to previous treatment: post tx soreness  Functional change: Too early in Plan of Care     Pain: 0/10  Location: bilateral legs     Objective      Objective Measures updated at progress report unless specified.     Treatment     Milton received the treatments listed below:      Therapeutic exercises to develop strength, endurance, ROM, and flexibility for 30 minutes including: See flowsheet below   Therapeutic activities to improve functional performance for 14 minutes including: See flowsheet below      Therapeutic-exercise  Reps    NuStep  8 minutes    Wedge  2 minute    Long Arc Quad  20 x, 2# *    Hamstring Curls  30 x green TB *   Quad Sets  30 x 3" *   Heel Slides  30 x    Short Arc Quad  30 x 1.5 #    Straight leg raises  20 x    Bridge  10 x    Hip ADD  30 x 3" *   Hip ABD  30 x green TB *                Therapeutic-Exercise  Reps        Heel Raises  2 x 10    Standing Marching  2 x 10    Mini Squats  2 x 10 *    Sit to stands  2 x 10 *  reps            Patient Education and Home Exercises       Education provided:   - Plan " of Care, Home exercise program, Delayed onset muscle soreness     Written Home Exercises Provided: Patient instructed to cont prior HEP. Exercises were reviewed and Milton was able to demonstrate them prior to the end of the session.  Milton demonstrated good  understanding of the education provided. See Electronic Medical Record under Patient Instructions for exercises provided during therapy sessions    Assessment   Milton is a 78 y.o. male referred to outpatient Physical Therapy with a medical diagnosis of muscle weakness. Patient presents with R lower extremity pain, decreased R lower extremity muscle strength and impaired motor control. Patient's impairments are currently limiting his activity tolerance and his overall functional mobility. LPTA  provided pt with proper setup on NuStep with resistance to improve strength and decrease stiffness prior to tx.  Patient required visual, verbal and tactile cueing throughout treatment for proper LE alignment, form, speed, and increased eccentric control during exercises. Increased reps/resistance of therapeutic exercises/activities as tolerated by patient.  Patient continues to require mod cues to complete supine straight leg raises' to prevent Quad lag.  Patient reports mod fatigue without adverse effects noted.     Patient prognosis is Good.   Patient will benefit from skilled outpatient Physical Therapy to address the deficits stated above and in the chart below, provide patient /family education, and to maximize patientt's level of independence.      Plan of care discussed with patient: Yes  Patient's spiritual, cultural and educational needs considered and patient is agreeable to the plan of care and goals as stated below:      Anticipated Barriers for therapy: compliance with Home exercise program     Goals:  Short Term Goals: 3 weeks   Patient will independently complete Home exercise program with correct form.   Patient will independently ambulate community  distances with a straight cane for increased independence with functional mobility.   Patient will complete sit to stand transfer with no UE use for increased independence with functional transfers.      Long Term Goals: 6 weeks   Patient will have increased R LE muscle strength to greater than or equal to 4/5 for improved gait mechanics.   Patient will negotiate up and down stairs with a non reciprocal pattern promoting return to PLOF.   Patient will a ambulate with increased gait speed to greater than or equal to 2.0 feet per second for increased safety with community ambulation.   Patient will complete Timed up and Go in less than or equal to 20 seconds for increased safety with functional mobility.     Plan   Plan of care Certification: 6/19/2024 to 8/2/2024.     Outpatient Physical Therapy 2 times weekly for 6 weeks to include the following interventions: Gait Training, Manual Therapy, Moist Heat/ Ice, Neuromuscular Re-ed, Patient Education, Therapeutic Activities, and Therapeutic Exercise.     Continue POC per PT orders to progress patient toward rehab goals.    ROSALVA Alcantara     7/1/2024

## 2024-07-03 ENCOUNTER — CLINICAL SUPPORT (OUTPATIENT)
Dept: REHABILITATION | Facility: HOSPITAL | Age: 79
End: 2024-07-03
Payer: MEDICARE

## 2024-07-03 DIAGNOSIS — M25.551 RIGHT HIP PAIN: Primary | ICD-10-CM

## 2024-07-03 PROCEDURE — 97530 THERAPEUTIC ACTIVITIES: CPT

## 2024-07-03 PROCEDURE — 97116 GAIT TRAINING THERAPY: CPT

## 2024-07-03 PROCEDURE — 97110 THERAPEUTIC EXERCISES: CPT

## 2024-07-03 NOTE — PROGRESS NOTES
"[facility]   Physical Therapy Treatment Note      Name: Milton Johnson  Clinic Number: [id number]    Therapy Diagnosis:   Encounter Diagnosis   Name Primary?    Right hip pain Yes     Physician: Valentina Walker    Visit Date: 7/3/2024      Physician Orders: PT Eval and Treat   Medical Diagnosis from Referral: muscular weakness   Evaluation Date: 6/19/2024  Authorization Period Expiration: 6/5/2025   Plan of Care Expiration: 8/2/2024  Progress Note Due: 8/2/2024  Date of Surgery: 5/17/2024  Visit # / Visits authorized: 6/12   FOTO: to be completed on visit 6      Precautions: Standard      Time In: 8:00  Time Out: 8:41  Total Billable Time: 41  minutes     PTA Visit #: 0/5    Subjective     Patient reports: "I've thought about trying to walk without the walker but I am afraid I'll fall."       He was compliant with home exercise program.  Response to previous treatment: post tx soreness  Functional change: Too early in Plan of Care     Pain: 0/10  Location: bilateral legs     Objective      Objective Measures updated at progress report unless specified.     Treatment     Milton received the treatments listed below:      Therapeutic exercises to develop strength, endurance, ROM, and flexibility for 20 minutes including: See flowsheet below   Therapeutic activities to improve functional performance for 12 minutes including: See flowsheet below      Therapeutic-exercise  Reps    NuStep  8 minutes    Wedge  2 minute    Long Arc Quad  30 x, 2# *    Hamstring Curls  30 x green TB    Quad Sets  30 x 3" *   Heel Slides  30 x    Short Arc Quad  30 x 1.5 #    Straight leg raises  20 x    Bridge  10 x    Hip ADD  30 x 3"    Hip ABD  30 x green TB                 Therapeutic-Exercise  Reps        Heel Raises  2 x 10    Standing Marching  2 x 10    Mini Squats  2 x 10    Sit to stands  2 x 10   reps       Gait Training x 8 minutes : PT completed gait training x 200 feet with straight cane in L " upper extremity. PT provided verbal and visual cueing for proper sequencing to progress to step through gait pattern.        Patient Education and Home Exercises       Education provided:   - Plan of Care, Home exercise program, Delayed onset muscle soreness     Written Home Exercises Provided: Patient instructed to cont prior HEP. Exercises were reviewed and Milton was able to demonstrate them prior to the end of the session.  Milton demonstrated good  understanding of the education provided. See Electronic Medical Record under Patient Instructions for exercises provided during therapy sessions    Assessment   Milton is a 78 y.o. male referred to outpatient Physical Therapy with a medical diagnosis of muscle weakness. Patient presents with R lower extremity pain, decreased R lower extremity muscle strength and impaired motor control. Patient's impairments are currently limiting his activity tolerance and his overall functional mobility. PT provided patient with visual, verbal and tactile cueing throughout treatment session for proper LE alignment, form, and increased motor control of R LE with exercises. PT instructed patient in ambulation with straight cane to increase independence with functional mobility. Patient demonstrated good understanding and carryover of instruction with gait training. PT educated patient on how to properly adjust straight cane. No adverse effects noted to treatment.     Patient prognosis is Good.   Patient will benefit from skilled outpatient Physical Therapy to address the deficits stated above and in the chart below, provide patient /family education, and to maximize patientt's level of independence.      Plan of care discussed with patient: Yes  Patient's spiritual, cultural and educational needs considered and patient is agreeable to the plan of care and goals as stated below:      Anticipated Barriers for therapy: compliance with Home exercise program     Goals:  Short Term Goals: 3  weeks   Patient will independently complete Home exercise program with correct form.   Patient will independently ambulate community distances with a straight cane for increased independence with functional mobility.   Patient will complete sit to stand transfer with no UE use for increased independence with functional transfers.      Long Term Goals: 6 weeks   Patient will have increased R LE muscle strength to greater than or equal to 4/5 for improved gait mechanics.   Patient will negotiate up and down stairs with a non reciprocal pattern promoting return to PLOF.   Patient will a ambulate with increased gait speed to greater than or equal to 2.0 feet per second for increased safety with community ambulation.   Patient will complete Timed up and Go in less than or equal to 20 seconds for increased safety with functional mobility.     Plan   Plan of care Certification: 6/19/2024 to 8/2/2024.     Outpatient Physical Therapy 2 times weekly for 6 weeks to include the following interventions: Gait Training, Manual Therapy, Moist Heat/ Ice, Neuromuscular Re-ed, Patient Education, Therapeutic Activities, and Therapeutic Exercise.     Continue POC per PT orders to progress patient toward rehab goals.    Katelynn Waddell, PT, DPT     7/3/2024

## 2024-07-09 ENCOUNTER — CLINICAL SUPPORT (OUTPATIENT)
Dept: REHABILITATION | Facility: HOSPITAL | Age: 79
End: 2024-07-09
Payer: MEDICARE

## 2024-07-09 DIAGNOSIS — M25.551 RIGHT HIP PAIN: Primary | ICD-10-CM

## 2024-07-09 PROCEDURE — 97110 THERAPEUTIC EXERCISES: CPT | Mod: CQ

## 2024-07-09 PROCEDURE — 97530 THERAPEUTIC ACTIVITIES: CPT | Mod: CQ

## 2024-07-09 NOTE — PROGRESS NOTES
"[facility]   Physical Therapy Treatment Note      Name: Milton Johnson  Clinic Number: [id number]    Therapy Diagnosis:   No diagnosis found.    Physician: Valentina Walker*    Visit Date: 7/9/2024      Physician Orders: PT Eval and Treat   Medical Diagnosis from Referral: muscular weakness   Evaluation Date: 6/19/2024  Authorization Period Expiration: 6/5/2025   Plan of Care Expiration: 8/2/2024  Progress Note Due: 8/2/2024  Date of Surgery: 5/17/2024  Visit # / Visits authorized: 7/12   FOTO: to be completed on visit 6      Precautions: Standard      Time In: 8:00  Time Out: 8:45  Total Billable Time: 45  minutes     PTA Visit #: 1/5    Subjective     Patient reports: "I feel comfortable walking with my cane."       He was compliant with home exercise program.  Response to previous treatment: post tx soreness  Functional change: ambulate with spc     Pain: 0/10  Location: bilateral legs     Objective      Objective Measures updated at progress report unless specified.     Treatment     Milton received the treatments listed below:      Therapeutic exercises to develop strength, endurance, ROM, and flexibility for 25 minutes including: See flowsheet below   Therapeutic activities to improve functional performance for 20 minutes including: See flowsheet below      Therapeutic-exercise  Reps    NuStep  8 minutes    Wedge  2 minute    Long Arc Quad  30 x, 2#    Hamstring Curls  30 x green TB    Quad Sets  30 x 3"    Heel Slides  30 x    Short Arc Quad  30 x 1.5#    Straight leg raises  20 x    Bridge  10 x    Hip ADD  30 x 3"    Hip ABD  30 x green TB                 Therapeutic-Exercise  Reps        Heel Raises  2 x 10    Standing Marching  2 x 10    Mini Squats  2 x 10    Sit to stands  2 x 10   reps       NOT COMPLETED--Gait Training x 8 minutes : PT completed gait training x 200 feet with straight cane in L upper extremity. PT provided verbal and visual cueing for proper " sequencing to progress to step through gait pattern.        Patient Education and Home Exercises       Education provided:   - Plan of Care, Home exercise program, Delayed onset muscle soreness     Written Home Exercises Provided: Patient instructed to cont prior HEP. Exercises were reviewed and Milton was able to demonstrate them prior to the end of the session.  Milton demonstrated good  understanding of the education provided. See Electronic Medical Record under Patient Instructions for exercises provided during therapy sessions    Assessment   Milton is a 78 y.o. male referred to outpatient Physical Therapy with a medical diagnosis of muscle weakness. Patient presents with R lower extremity pain, decreased R lower extremity muscle strength and impaired motor control. Patient's impairments are currently limiting his activity tolerance and his overall functional mobility. LPTA provided pt with visual, verbal and tactile cueing throughout tx session for proper LE alignment, form, and increased motor control of R LE with exercises. Pt displays improved strength and activity tolerance throughout tx.  Pt maintains proper gait sequence and functional mobility during ambulation around therapy gym. No adverse effects noted to treatment.     Patient prognosis is Good.   Patient will benefit from skilled outpatient Physical Therapy to address the deficits stated above and in the chart below, provide patient /family education, and to maximize patientt's level of independence.      Plan of care discussed with patient: Yes  Patient's spiritual, cultural and educational needs considered and patient is agreeable to the plan of care and goals as stated below:      Anticipated Barriers for therapy: compliance with Home exercise program     Goals:  Short Term Goals: 3 weeks   Patient will independently complete Home exercise program with correct form.   Patient will independently ambulate community distances with a straight cane  for increased independence with functional mobility.   Patient will complete sit to stand transfer with no UE use for increased independence with functional transfers.      Long Term Goals: 6 weeks   Patient will have increased R LE muscle strength to greater than or equal to 4/5 for improved gait mechanics.   Patient will negotiate up and down stairs with a non reciprocal pattern promoting return to PLOF.   Patient will a ambulate with increased gait speed to greater than or equal to 2.0 feet per second for increased safety with community ambulation.   Patient will complete Timed up and Go in less than or equal to 20 seconds for increased safety with functional mobility.     Plan   Plan of care Certification: 6/19/2024 to 8/2/2024.     Outpatient Physical Therapy 2 times weekly for 6 weeks to include the following interventions: Gait Training, Manual Therapy, Moist Heat/ Ice, Neuromuscular Re-ed, Patient Education, Therapeutic Activities, and Therapeutic Exercise.     Continue POC per PT orders to progress patient toward rehab goals.    ROSALVA Ohara  7/9/2024

## 2024-07-11 ENCOUNTER — CLINICAL SUPPORT (OUTPATIENT)
Dept: REHABILITATION | Facility: HOSPITAL | Age: 79
End: 2024-07-11
Payer: MEDICARE

## 2024-07-11 DIAGNOSIS — M25.551 RIGHT HIP PAIN: Primary | ICD-10-CM

## 2024-07-11 PROCEDURE — 97530 THERAPEUTIC ACTIVITIES: CPT

## 2024-07-11 PROCEDURE — 97110 THERAPEUTIC EXERCISES: CPT

## 2024-07-11 NOTE — PROGRESS NOTES
"[facility]   Physical Therapy Treatment Note      Name: Milton Johnson  Clinic Number: [id number]    Therapy Diagnosis:   Encounter Diagnosis   Name Primary?    Right hip pain Yes       Physician: Valentina Walker    Visit Date: 7/11/2024      Physician Orders: PT Eval and Treat   Medical Diagnosis from Referral: muscular weakness   Evaluation Date: 6/19/2024  Authorization Period Expiration: 6/5/2025   Plan of Care Expiration: 8/2/2024  Progress Note Due: 8/2/2024  Date of Surgery: 5/17/2024  Visit # / Visits authorized: 8/12   FOTO: to be completed on visit 6      Precautions: Standard      Time In: 7:58 (later check in time)  Time Out: 8:44  Total Billable Time: 46  minutes     PTA Visit #: 0/5    Subjective     Patient reports: "I like walking with this cane."       He was compliant with home exercise program.  Response to previous treatment: post tx soreness  Functional change: ambulate with spc     Pain: 0/10  Location: bilateral legs     Objective      Objective Measures updated at progress report unless specified.     Treatment     Milton received the treatments listed below:      Therapeutic exercises to develop strength, endurance, ROM, and flexibility for 25 minutes including: See flowsheet below   Therapeutic activities to improve functional performance for 21 minutes including: See flowsheet below      Therapeutic-exercise  Reps    NuStep  8 minutes    Wedge  2 minute    Long Arc Quad  30 x, 2#    Hamstring Curls  30 x green TB    Quad Sets  30 x 3"    Short Arc Quad  20 x 2# *   Straight leg raises  20 x    Bridge  2 x 10     Hip ADD  30 x 3"    Hip ABD  30 x green TB                 Therapeutic-Exercise  Reps        Heel Raises  3 x 10 *   Standing hip and knee flexion  2 x 10 yellow TB *   Mini Squats  2 x 10    Sit to stands  2 x 10   reps         Patient Education and Home Exercises       Education provided:   - Plan of Care, Home exercise program, Delayed onset " muscle soreness     Written Home Exercises Provided: Patient instructed to cont prior HEP. Exercises were reviewed and Milton was able to demonstrate them prior to the end of the session.  Milton demonstrated good  understanding of the education provided. See Electronic Medical Record under Patient Instructions for exercises provided during therapy sessions    Assessment   Milton is a 78 y.o. male referred to outpatient Physical Therapy with a medical diagnosis of muscle weakness. Patient presents with R lower extremity pain, decreased R lower extremity muscle strength and impaired motor control. Patient's impairments are currently limiting his activity tolerance and his overall functional mobility. PT positioned patient correctly on NuStep and increased resistance appropriately to continue increasing endurance of lower extremities. PT provided patient with verbal cueing and visual prompting during standing tasks and exercise to promote proper speed, form, and control to increase effectiveness of exercises. Patient has no reports of pain or adverse effects to treatment tasks.   Patient prognosis is Good.   Patient will benefit from skilled outpatient Physical Therapy to address the deficits stated above and in the chart below, provide patient /family education, and to maximize patientt's level of independence.      Plan of care discussed with patient: Yes  Patient's spiritual, cultural and educational needs considered and patient is agreeable to the plan of care and goals as stated below:      Anticipated Barriers for therapy: compliance with Home exercise program     Goals:  Short Term Goals: 3 weeks   Patient will independently complete Home exercise program with correct form.   Patient will independently ambulate community distances with a straight cane for increased independence with functional mobility.   Patient will complete sit to stand transfer with no UE use for increased independence with functional  transfers.      Long Term Goals: 6 weeks   Patient will have increased R LE muscle strength to greater than or equal to 4/5 for improved gait mechanics.   Patient will negotiate up and down stairs with a non reciprocal pattern promoting return to PLOF.   Patient will a ambulate with increased gait speed to greater than or equal to 2.0 feet per second for increased safety with community ambulation.   Patient will complete Timed up and Go in less than or equal to 20 seconds for increased safety with functional mobility.     Plan   Plan of care Certification: 6/19/2024 to 8/2/2024.     Outpatient Physical Therapy 2 times weekly for 6 weeks to include the following interventions: Gait Training, Manual Therapy, Moist Heat/ Ice, Neuromuscular Re-ed, Patient Education, Therapeutic Activities, and Therapeutic Exercise.     Continue POC per PT orders to progress patient toward rehab goals.    Katelynn Waddell, PT, DPT     7/11/2024

## 2024-07-16 ENCOUNTER — CLINICAL SUPPORT (OUTPATIENT)
Dept: REHABILITATION | Facility: HOSPITAL | Age: 79
End: 2024-07-16
Payer: MEDICARE

## 2024-07-16 DIAGNOSIS — M25.551 RIGHT HIP PAIN: Primary | ICD-10-CM

## 2024-07-16 DIAGNOSIS — M62.81 MUSCULAR WEAKNESS: ICD-10-CM

## 2024-07-16 PROCEDURE — 97110 THERAPEUTIC EXERCISES: CPT | Mod: CQ

## 2024-07-16 PROCEDURE — 97530 THERAPEUTIC ACTIVITIES: CPT | Mod: CQ

## 2024-07-16 NOTE — PROGRESS NOTES
"[facility]   Physical Therapy Treatment Note      Name: Milton Johnson  Clinic Number: [id number]    Therapy Diagnosis:   No diagnosis found.      Physician: Valentina Walker    Visit Date: 7/16/2024      Physician Orders: PT Eval and Treat   Medical Diagnosis from Referral: muscular weakness   Evaluation Date: 6/19/2024  Authorization Period Expiration: 6/5/2025   Plan of Care Expiration: 8/2/2024  Progress Note Due: 8/2/2024  Date of Surgery: 5/17/2024  Visit # / Visits authorized: 8/12   FOTO: to be completed on visit 6      Precautions: Standard      Time In: 8:02  Time Out: 8:47  Total Billable Time: 45  minutes     PTA Visit #: 1/5    Subjective     Patient reports: Walking with the cane is much better than walking with the walker.       He was compliant with home exercise program.  Response to previous treatment: post tx soreness  Functional change: ambulate with spc     Pain: 0/10  Location: bilateral legs     Objective      Objective Measures updated at progress report unless specified.     Treatment     Milton received the treatments listed below:      Therapeutic exercises to develop strength, endurance, ROM, and flexibility for 25 minutes including: See flowsheet below   Therapeutic activities to improve functional performance for 20 minutes including: See flowsheet below      Therapeutic-exercise  Reps    NuStep  8 minutes    Wedge  2 minute    Long Arc Quad  2 x 10, 3# *    Hamstring Curls  30 x green TB    Glut sets  30 x 3"    Short Arc Quad  20 x 2# *   Straight leg raises  20 x    Bridge  2 x 10     Hip ADD  30 x 3"    Hip ABD  30 x green TB                 Therapeutic-Exercise  Reps        Heel Raises  3 x 10    Standing hip and knee flexion  2 x 10 yellow TB *   Mini Squats  2 x 10    Sit to stands  2 x 10   reps         Patient Education and Home Exercises       Education provided:   - Plan of Care, Home exercise program, Delayed onset muscle soreness "     Written Home Exercises Provided: Patient instructed to cont prior HEP. Exercises were reviewed and Milton was able to demonstrate them prior to the end of the session.  Milton demonstrated good  understanding of the education provided. See Electronic Medical Record under Patient Instructions for exercises provided during therapy sessions    Assessment   Milton is a 78 y.o. male referred to outpatient Physical Therapy with a medical diagnosis of muscle weakness. Patient presents with R lower extremity pain, decreased R lower extremity muscle strength and impaired motor control. Patient's impairments are currently limiting his activity tolerance and his overall functional mobility. LPTA  positioned patient correctly on NuStep and increased resistance appropriately to continue increasing endurance of lower extremities. LPTA  provided patient with verbal cueing and visual prompting during standing tasks and exercise to promote proper speed, form, and control to increase effectiveness of exercises. Increased reps of therapeutic exercises and therapeutic activities as tolerated by patient. Patient has no reports of pain or adverse effects to treatment tasks.       Patient prognosis is Good.   Patient will benefit from skilled outpatient Physical Therapy to address the deficits stated above and in the chart below, provide patient /family education, and to maximize patientt's level of independence.      Plan of care discussed with patient: Yes  Patient's spiritual, cultural and educational needs considered and patient is agreeable to the plan of care and goals as stated below:      Anticipated Barriers for therapy: compliance with Home exercise program     Goals:  Short Term Goals: 3 weeks   Patient will independently complete Home exercise program with correct form.   Patient will independently ambulate community distances with a straight cane for increased independence with functional mobility.   Patient will complete  sit to stand transfer with no UE use for increased independence with functional transfers.      Long Term Goals: 6 weeks   Patient will have increased R LE muscle strength to greater than or equal to 4/5 for improved gait mechanics.   Patient will negotiate up and down stairs with a non reciprocal pattern promoting return to PLOF.   Patient will a ambulate with increased gait speed to greater than or equal to 2.0 feet per second for increased safety with community ambulation.   Patient will complete Timed up and Go in less than or equal to 20 seconds for increased safety with functional mobility.     Plan   Plan of care Certification: 6/19/2024 to 8/2/2024.     Outpatient Physical Therapy 2 times weekly for 6 weeks to include the following interventions: Gait Training, Manual Therapy, Moist Heat/ Ice, Neuromuscular Re-ed, Patient Education, Therapeutic Activities, and Therapeutic Exercise.     Continue POC per PT orders to progress patient toward rehab goals.    ROSALVA Alcantara     7/16/2024

## 2024-07-18 ENCOUNTER — CLINICAL SUPPORT (OUTPATIENT)
Dept: REHABILITATION | Facility: HOSPITAL | Age: 79
End: 2024-07-18
Payer: MEDICARE

## 2024-07-18 DIAGNOSIS — M25.551 RIGHT HIP PAIN: Primary | ICD-10-CM

## 2024-07-18 DIAGNOSIS — M62.81 MUSCULAR WEAKNESS: ICD-10-CM

## 2024-07-18 PROCEDURE — 97530 THERAPEUTIC ACTIVITIES: CPT | Mod: CQ

## 2024-07-18 PROCEDURE — 97110 THERAPEUTIC EXERCISES: CPT | Mod: CQ

## 2024-07-18 NOTE — PROGRESS NOTES
"[facility]   Physical Therapy Treatment Note      Name: Milton Johnson  Clinic Number: [id number]    Therapy Diagnosis:        Encounter Diagnoses   Name Primary?    Muscular weakness      Right hip pain Yes     Physician: Valentina Walker    Visit Date: 7/18/2024      Physician Orders: PT Eval and Treat   Medical Diagnosis from Referral: muscular weakness   Evaluation Date: 6/19/2024  Authorization Period Expiration: 6/5/2025   Plan of Care Expiration: 8/2/2024  Progress Note Due: 8/2/2024  Date of Surgery: 5/17/2024  Visit # / Visits authorized: 10/12   FOTO: to be completed on visit 6      Precautions: Standard      Time In: 8:04  Time Out: 8:42  Total Billable Time: 38 minutes     PTA Visit #: 2/5    Subjective     Patient reports: No new complaints upon arrival to PT treatment, and is ambulating with SC.        He was compliant with home exercise program.  Response to previous treatment: post tx soreness  Functional change: ambulate with spc     Pain: 0/10  Location: bilateral legs     Objective      Objective Measures updated at progress report unless specified.     Treatment     Milton received the treatments listed below:      Therapeutic exercises to develop strength, endurance, ROM, and flexibility for 18 minutes including: See flowsheet below   Therapeutic activities to improve functional performance for 20 minutes including: See flowsheet below      Therapeutic-exercise  Reps    NuStep  8 minutes    Wedge  2 minute    Long Arc Quad  2 x 10, 3# *    Hamstring Curls  30 x green TB    Glut sets  30 x 3"    Short Arc Quad  20 x 2# *   Straight leg raises  20 x    Bridge  2 x 10     Hip ADD  30 x 3"    Hip ABD  30 x green TB                 Therapeutic-Exercise  Reps        Heel Raises  3 x 10    Standing hip and knee flexion  2 x 10 yellow TB *   Mini Squats  2 x 10    Sit to stands  2 x 10   reps           Patient Education and Home Exercises       Education provided:   - " Plan of Care, Home exercise program, Delayed onset muscle soreness     Written Home Exercises Provided: Patient instructed to cont prior HEP. Exercises were reviewed and Milton was able to demonstrate them prior to the end of the session.  Milton demonstrated good  understanding of the education provided. See Electronic Medical Record under Patient Instructions for exercises provided during therapy sessions    Assessment   Milton is a 78 y.o. male referred to outpatient Physical Therapy with a medical diagnosis of muscle weakness. Patient presents with R lower extremity pain, decreased R lower extremity muscle strength and impaired motor control. Patient's impairments are currently limiting his activity tolerance and his overall functional mobility. LPTA  positioned patient correctly on NuStep and increased resistance appropriately to continue increasing endurance of lower extremities. LPTA  provided patient with verbal cueing and visual prompting during standing tasks and exercise to promote proper speed, form, and control to increase effectiveness of exercises. Increased reps of therapeutic exercises and therapeutic activities as tolerated by patient.   Patient requires less time and effort to progress through completion of Therapeutic exercises.     Patient prognosis is Good.   Patient will benefit from skilled outpatient Physical Therapy to address the deficits stated above and in the chart below, provide patient /family education, and to maximize patientt's level of independence.      Plan of care discussed with patient: Yes  Patient's spiritual, cultural and educational needs considered and patient is agreeable to the plan of care and goals as stated below:      Anticipated Barriers for therapy: compliance with Home exercise program     Goals:  Short Term Goals: 3 weeks   Patient will independently complete Home exercise program with correct form.   Patient will independently ambulate community distances with  a straight cane for increased independence with functional mobility.   Patient will complete sit to stand transfer with no UE use for increased independence with functional transfers.      Long Term Goals: 6 weeks   Patient will have increased R LE muscle strength to greater than or equal to 4/5 for improved gait mechanics.   Patient will negotiate up and down stairs with a non reciprocal pattern promoting return to PLOF.   Patient will a ambulate with increased gait speed to greater than or equal to 2.0 feet per second for increased safety with community ambulation.   Patient will complete Timed up and Go in less than or equal to 20 seconds for increased safety with functional mobility.     Plan   Plan of care Certification: 6/19/2024 to 8/2/2024.     Outpatient Physical Therapy 2 times weekly for 6 weeks to include the following interventions: Gait Training, Manual Therapy, Moist Heat/ Ice, Neuromuscular Re-ed, Patient Education, Therapeutic Activities, and Therapeutic Exercise.     Continue POC per PT orders to progress patient toward rehab goals.    ROSALVA Aclantara     7/18/2024

## 2024-07-23 ENCOUNTER — CLINICAL SUPPORT (OUTPATIENT)
Dept: REHABILITATION | Facility: HOSPITAL | Age: 79
End: 2024-07-23
Payer: MEDICARE

## 2024-07-23 DIAGNOSIS — M25.551 RIGHT HIP PAIN: Primary | ICD-10-CM

## 2024-07-23 PROCEDURE — 97530 THERAPEUTIC ACTIVITIES: CPT | Mod: KX

## 2024-07-23 PROCEDURE — 97110 THERAPEUTIC EXERCISES: CPT | Mod: KX

## 2024-07-23 NOTE — PROGRESS NOTES
"[facility]   Physical Therapy Treatment Note      Name: Milton Johnson  Clinic Number: [id number]    Therapy Diagnosis:        Encounter Diagnoses   Name Primary?    Muscular weakness      Right hip pain Yes     Physician: Valentina Walker    Visit Date: 7/23/2024      Physician Orders: PT Eval and Treat   Medical Diagnosis from Referral: muscular weakness   Evaluation Date: 6/19/2024  Authorization Period Expiration: 6/5/2025   Plan of Care Expiration: 8/2/2024  Progress Note Due: 8/2/2024  Date of Surgery: 5/17/2024  Visit # / Visits authorized: 11/12   FOTO: to be completed on visit 6      Precautions: Standard      Time In: 7:58  Time Out: 8:43  Total Billable Time: 45 minutes     PTA Visit #: 0/5    Subjective     Patient reports: that he has begun ambulating within his home with no assistive device. He denies pain upon arrival to PT.      He was compliant with home exercise program.  Response to previous treatment: post tx soreness  Functional change: ambulate with spc     Pain: 0/10  Location: bilateral legs     Objective      Objective Measures updated at progress report unless specified.     Treatment     Milton received the treatments listed below:      Therapeutic exercises to develop strength, endurance, ROM, and flexibility for 29 minutes including: See flowsheet below   Therapeutic activities to improve functional performance for 16 minutes including: See flowsheet below      Therapeutic-exercise  Reps    NuStep  8 minutes    Wedge  2 minute    Long Arc Quad  3 x 10, 3# *   Hamstring Curls  30 x green TB    Glut sets  30 x 3"    Short Arc Quad  20 x 2# *   Straight leg raises  20 x    Bridge  2 x 10     Hip ADD  30 x 3"    Hip ABD  30 x green TB    Clamshells  15 x green TB *          Therapeutic-Activities  Reps        Heel Raises  3 x 10    Standing hip and knee flexion  2 x 10 yellow TB    Mini Squats  2 x 10    Sit to stands  2 x 10   reps    Forward step ups  2 " x 10 4 inch step *          Patient Education and Home Exercises       Education provided:   - Plan of Care, Home exercise program, Delayed onset muscle soreness     Written Home Exercises Provided: Patient instructed to cont prior HEP. Exercises were reviewed and Milton was able to demonstrate them prior to the end of the session.  Milton demonstrated good  understanding of the education provided. See Electronic Medical Record under Patient Instructions for exercises provided during therapy sessions    Assessment   Milton is a 78 y.o. male referred to outpatient Physical Therapy with a medical diagnosis of muscle weakness. Patient presents with R lower extremity pain, decreased R lower extremity muscle strength and impaired motor control. Patient's impairments are currently limiting his activity tolerance and his overall functional mobility. PT assessed patient gait mechanics without straight cane and noted that patient continues to have occasional R trendelenburg that he is able to correct with minimal cueing. PT added hip strengthening exercises and focused on increasing R quad control during treatment session. Patient had no reports of adverse effects to therapy tasks.     Patient prognosis is Good.   Patient will benefit from skilled outpatient Physical Therapy to address the deficits stated above and in the chart below, provide patient /family education, and to maximize patientt's level of independence.      Plan of care discussed with patient: Yes  Patient's spiritual, cultural and educational needs considered and patient is agreeable to the plan of care and goals as stated below:      Anticipated Barriers for therapy: compliance with Home exercise program     Goals:  Short Term Goals: 3 weeks   Patient will independently complete Home exercise program with correct form.   Patient will independently ambulate community distances with a straight cane for increased independence with functional mobility.    Patient will complete sit to stand transfer with no UE use for increased independence with functional transfers.      Long Term Goals: 6 weeks   Patient will have increased R LE muscle strength to greater than or equal to 4/5 for improved gait mechanics.   Patient will negotiate up and down stairs with a non reciprocal pattern promoting return to PLOF.   Patient will a ambulate with increased gait speed to greater than or equal to 2.0 feet per second for increased safety with community ambulation.   Patient will complete Timed up and Go in less than or equal to 20 seconds for increased safety with functional mobility.     Plan   Plan of care Certification: 6/19/2024 to 8/2/2024.     Outpatient Physical Therapy 2 times weekly for 6 weeks to include the following interventions: Gait Training, Manual Therapy, Moist Heat/ Ice, Neuromuscular Re-ed, Patient Education, Therapeutic Activities, and Therapeutic Exercise.     Continue POC per PT orders to progress patient toward rehab goals.    Katelynn Waddell, PT, DPT       7/23/2024

## 2024-07-25 ENCOUNTER — CLINICAL SUPPORT (OUTPATIENT)
Dept: REHABILITATION | Facility: HOSPITAL | Age: 79
End: 2024-07-25
Payer: MEDICARE

## 2024-07-25 ENCOUNTER — DOCUMENTATION ONLY (OUTPATIENT)
Dept: REHABILITATION | Facility: HOSPITAL | Age: 79
End: 2024-07-25
Payer: MEDICARE

## 2024-07-25 DIAGNOSIS — M25.551 RIGHT HIP PAIN: Primary | ICD-10-CM

## 2024-07-25 PROCEDURE — 97110 THERAPEUTIC EXERCISES: CPT | Mod: CQ

## 2024-07-25 PROCEDURE — 97530 THERAPEUTIC ACTIVITIES: CPT | Mod: CQ

## 2024-07-25 NOTE — PROGRESS NOTES
[facility]  PT Discharge Note    Name: Milton Johnson  Clinic Number: [id number]    Therapy Diagnosis:           Encounter Diagnoses   Name Primary?    Muscular weakness      Right hip pain Yes      Physician: Valentina Walker*  Physician Orders: PT Eval and Treat   Medical Diagnosis from Referral: muscular weakness   Evaluation Date: 6/19/2024  Date of Last visit: 7/25/2024  Total Visits Received: 8    ASSESSMENT    Patient has met all rehab goals at this time and is appropriate to discharge to Home exercise program.     Discharge reason: Patient has met all of his goals    Discharge FOTO Score: 73%    Goals:   Short Term Goals: 3 weeks   Patient will independently complete Home exercise program with correct form. -MET   Patient will independently ambulate community distances with a straight cane for increased independence with functional mobility. -MET   Patient will complete sit to stand transfer with no UE use for increased independence with functional transfers. -MET      Long Term Goals: 6 weeks   Patient will have increased R LE muscle strength to greater than or equal to 4/5 for improved gait mechanics. -MET   Patient will negotiate up and down stairs with a non reciprocal pattern promoting return to PLOF. -MET   Patient will a ambulate with increased gait speed to greater than or equal to 2.0 feet per second for increased safety with community ambulation. -MET   Patient will complete Timed up and Go in less than or equal to 20 seconds for increased safety with functional mobility. -MET     PLAN   This patient is discharged from Physical Therapy      Katelynn Waddell, PT, DPT

## 2024-07-25 NOTE — PROGRESS NOTES
"[facility]   Physical Therapy Treatment Note      Name: Milton Johnson  Clinic Number: [id number]    Therapy Diagnosis:        Encounter Diagnoses   Name Primary?    Muscular weakness      Right hip pain Yes     Physician: Valentina Walker    Visit Date: 7/25/2024      Physician Orders: PT Eval and Treat   Medical Diagnosis from Referral: muscular weakness   Evaluation Date: 6/19/2024  Authorization Period Expiration: 6/5/2025   Plan of Care Expiration: 8/2/2024  Progress Note Due: 8/2/2024  Date of Surgery: 5/17/2024  Visit # / Visits authorized: 12/12   FOTO: to be completed on visit 6      Precautions: Standard      Time In: 8:04  Time Out: 8:46  Total Billable Time: 42 minutes     PTA Visit #: 1/5    Subjective     Patient reports:  "I think I'm doing pretty good.  Therapy has really helped me".     He was compliant with home exercise program.  Response to previous treatment: post tx soreness  Functional change: ambulate with spc     Pain: 0/10  Location: bilateral legs     Objective      Objective Measures updated at progress report unless specified.     Treatment     Milton received the treatments listed below:      Therapeutic exercises to develop strength, endurance, ROM, and flexibility for 27 minutes including: See flowsheet below   Therapeutic activities to improve functional performance for 15 minutes including: See flowsheet below      Therapeutic-exercise  Reps    NuStep  8 minutes    Wedge  2 minute    Long Arc Quad  3 x 10, 3# *   Hamstring Curls  30 x green TB    Glut sets  30 x 3"    Short Arc Quad  20 x 2# *   Straight leg raises  20 x    Bridge  2 x 10  (hip add with bridge ) *    Hip ABD  30 x green TB    Clamshells  15 x green TB *          Therapeutic-Activities  Reps        Heel Raises  3 x 10    Standing hip and knee flexion  2 x 10 yellow TB    Mini Squats  2 x 10    Sit to stands  2 x 10   reps    Forward step ups  2 x 10 4 inch step *          Patient " Education and Home Exercises       Education provided:   - Plan of Care, Home exercise program, Delayed onset muscle soreness     Written Home Exercises Provided: Patient instructed to cont prior HEP. Exercises were reviewed and Milton was able to demonstrate them prior to the end of the session.  Milton demonstrated good  understanding of the education provided. See Electronic Medical Record under Patient Instructions for exercises provided during therapy sessions    Assessment   Milton is a 78 y.o. male referred to outpatient Physical Therapy with a medical diagnosis of muscle weakness. Patient presents with R lower extremity pain, decreased R lower extremity muscle strength and impaired motor control. Patient's impairments are currently limiting his activity tolerance and his overall functional mobility.  Patient is knowledgeable of progression through Therapeutic exercises, and of Home exercise program. Patient's goals assessed by supervising physical therapist for possible D/C from PT due to last scheduled visit in Plan of Care.           Patient prognosis is Good.   Patient will benefit from skilled outpatient Physical Therapy to address the deficits stated above and in the chart below, provide patient /family education, and to maximize patientt's level of independence.      Plan of care discussed with patient: Yes  Patient's spiritual, cultural and educational needs considered and patient is agreeable to the plan of care and goals as stated below:      Anticipated Barriers for therapy: compliance with Home exercise program     Goals:  Short Term Goals: 3 weeks   Patient will independently complete Home exercise program with correct form.   Patient will independently ambulate community distances with a straight cane for increased independence with functional mobility.   Patient will complete sit to stand transfer with no UE use for increased independence with functional transfers.      Long Term Goals: 6 weeks    Patient will have increased R LE muscle strength to greater than or equal to 4/5 for improved gait mechanics.   Patient will negotiate up and down stairs with a non reciprocal pattern promoting return to PLOF.   Patient will a ambulate with increased gait speed to greater than or equal to 2.0 feet per second for increased safety with community ambulation.   Patient will complete Timed up and Go in less than or equal to 20 seconds for increased safety with functional mobility.     Plan   Plan of care Certification: 6/19/2024 to 8/2/2024.     Outpatient Physical Therapy 2 times weekly for 6 weeks to include the following interventions: Gait Training, Manual Therapy, Moist Heat/ Ice, Neuromuscular Re-ed, Patient Education, Therapeutic Activities, and Therapeutic Exercise.     Plan to discharge patient from physical therapy services with PT goals met.   ROSALVA Alcantara  7/25/2024

## 2024-07-31 ENCOUNTER — OFFICE VISIT (OUTPATIENT)
Dept: FAMILY MEDICINE | Facility: CLINIC | Age: 79
End: 2024-07-31
Payer: MEDICARE

## 2024-07-31 VITALS
DIASTOLIC BLOOD PRESSURE: 82 MMHG | BODY MASS INDEX: 27.92 KG/M2 | HEIGHT: 73 IN | WEIGHT: 210.63 LBS | OXYGEN SATURATION: 98 % | TEMPERATURE: 98 F | SYSTOLIC BLOOD PRESSURE: 122 MMHG | HEART RATE: 71 BPM

## 2024-07-31 DIAGNOSIS — N18.31 TYPE 2 DIABETES MELLITUS WITH STAGE 3A CHRONIC KIDNEY DISEASE, WITH LONG-TERM CURRENT USE OF INSULIN: ICD-10-CM

## 2024-07-31 DIAGNOSIS — E11.22 TYPE 2 DIABETES MELLITUS WITH STAGE 3A CHRONIC KIDNEY DISEASE, WITH LONG-TERM CURRENT USE OF INSULIN: ICD-10-CM

## 2024-07-31 DIAGNOSIS — I10 HYPERTENSION, UNSPECIFIED TYPE: Primary | ICD-10-CM

## 2024-07-31 DIAGNOSIS — R63.4 WEIGHT LOSS: ICD-10-CM

## 2024-07-31 DIAGNOSIS — E78.5 HYPERLIPIDEMIA, UNSPECIFIED HYPERLIPIDEMIA TYPE: ICD-10-CM

## 2024-07-31 DIAGNOSIS — R73.09 HEMOGLOBIN A1C GREATER THAN 9.0%: ICD-10-CM

## 2024-07-31 DIAGNOSIS — Z79.4 TYPE 2 DIABETES MELLITUS WITH STAGE 3A CHRONIC KIDNEY DISEASE, WITH LONG-TERM CURRENT USE OF INSULIN: ICD-10-CM

## 2024-07-31 LAB
ALBUMIN SERPL BCP-MCNC: 3.4 G/DL (ref 3.5–5)
ALBUMIN/GLOB SERPL: 1.2 {RATIO}
ALP SERPL-CCNC: 91 U/L (ref 45–115)
ALT SERPL W P-5'-P-CCNC: 18 U/L (ref 16–61)
ANION GAP SERPL CALCULATED.3IONS-SCNC: 11 MMOL/L (ref 7–16)
AST SERPL W P-5'-P-CCNC: 8 U/L (ref 15–37)
BASOPHILS # BLD AUTO: 0.07 K/UL (ref 0–0.2)
BASOPHILS NFR BLD AUTO: 0.6 % (ref 0–1)
BILIRUB SERPL-MCNC: 0.4 MG/DL (ref ?–1.2)
BUN SERPL-MCNC: 16 MG/DL (ref 7–18)
BUN/CREAT SERPL: 15 (ref 6–20)
CALCIUM SERPL-MCNC: 9.4 MG/DL (ref 8.5–10.1)
CHLORIDE SERPL-SCNC: 106 MMOL/L (ref 98–107)
CHOLEST SERPL-MCNC: 90 MG/DL (ref 0–200)
CHOLEST/HDLC SERPL: 2.3 {RATIO}
CO2 SERPL-SCNC: 24 MMOL/L (ref 21–32)
CREAT SERPL-MCNC: 1.06 MG/DL (ref 0.7–1.3)
DIFFERENTIAL METHOD BLD: ABNORMAL
EGFR (NO RACE VARIABLE) (RUSH/TITUS): 72 ML/MIN/1.73M2
EOSINOPHIL # BLD AUTO: 0.2 K/UL (ref 0–0.5)
EOSINOPHIL NFR BLD AUTO: 1.9 % (ref 1–4)
ERYTHROCYTE [DISTWIDTH] IN BLOOD BY AUTOMATED COUNT: 13.5 % (ref 11.5–14.5)
GLOBULIN SER-MCNC: 2.9 G/DL (ref 2–4)
GLUCOSE SERPL-MCNC: 103 MG/DL (ref 74–106)
HCT VFR BLD AUTO: 42.6 % (ref 40–54)
HDLC SERPL-MCNC: 40 MG/DL (ref 40–60)
HGB BLD-MCNC: 13.5 G/DL (ref 13.5–18)
IMM GRANULOCYTES # BLD AUTO: 0.05 K/UL (ref 0–0.04)
IMM GRANULOCYTES NFR BLD: 0.5 % (ref 0–0.4)
LDLC SERPL CALC-MCNC: 36 MG/DL
LYMPHOCYTES # BLD AUTO: 1.83 K/UL (ref 1–4.8)
LYMPHOCYTES NFR BLD AUTO: 17 % (ref 27–41)
MCH RBC QN AUTO: 28.7 PG (ref 27–31)
MCHC RBC AUTO-ENTMCNC: 31.7 G/DL (ref 32–36)
MCV RBC AUTO: 90.4 FL (ref 80–96)
MONOCYTES # BLD AUTO: 0.64 K/UL (ref 0–0.8)
MONOCYTES NFR BLD AUTO: 5.9 % (ref 2–6)
MPC BLD CALC-MCNC: 9.9 FL (ref 9.4–12.4)
NEUTROPHILS # BLD AUTO: 7.98 K/UL (ref 1.8–7.7)
NEUTROPHILS NFR BLD AUTO: 74.1 % (ref 53–65)
NONHDLC SERPL-MCNC: 50 MG/DL
NRBC # BLD AUTO: 0 X10E3/UL
NRBC, AUTO (.00): 0 %
PLATELET # BLD AUTO: 310 K/UL (ref 150–400)
POTASSIUM SERPL-SCNC: 4 MMOL/L (ref 3.5–5.1)
PROT SERPL-MCNC: 6.3 G/DL (ref 6.4–8.2)
RBC # BLD AUTO: 4.71 M/UL (ref 4.6–6.2)
SODIUM SERPL-SCNC: 137 MMOL/L (ref 136–145)
T4 FREE SERPL-MCNC: 1 NG/DL (ref 0.76–1.46)
TRIGL SERPL-MCNC: 72 MG/DL (ref 35–150)
TSH SERPL DL<=0.005 MIU/L-ACNC: 2.17 UIU/ML (ref 0.36–3.74)
VLDLC SERPL-MCNC: 14 MG/DL
WBC # BLD AUTO: 10.77 K/UL (ref 4.5–11)

## 2024-07-31 PROCEDURE — 80050 GENERAL HEALTH PANEL: CPT | Mod: ,,, | Performed by: CLINICAL MEDICAL LABORATORY

## 2024-07-31 PROCEDURE — 99214 OFFICE O/P EST MOD 30 MIN: CPT | Mod: ,,, | Performed by: FAMILY MEDICINE

## 2024-07-31 PROCEDURE — 3079F DIAST BP 80-89 MM HG: CPT | Mod: ,,, | Performed by: FAMILY MEDICINE

## 2024-07-31 PROCEDURE — 82570 ASSAY OF URINE CREATININE: CPT | Mod: ,,, | Performed by: CLINICAL MEDICAL LABORATORY

## 2024-07-31 PROCEDURE — 82043 UR ALBUMIN QUANTITATIVE: CPT | Mod: ,,, | Performed by: CLINICAL MEDICAL LABORATORY

## 2024-07-31 PROCEDURE — 80061 LIPID PANEL: CPT | Mod: ,,, | Performed by: CLINICAL MEDICAL LABORATORY

## 2024-07-31 PROCEDURE — 1159F MED LIST DOCD IN RCRD: CPT | Mod: ,,, | Performed by: FAMILY MEDICINE

## 2024-07-31 PROCEDURE — 84439 ASSAY OF FREE THYROXINE: CPT | Mod: ,,, | Performed by: CLINICAL MEDICAL LABORATORY

## 2024-07-31 PROCEDURE — 3074F SYST BP LT 130 MM HG: CPT | Mod: ,,, | Performed by: FAMILY MEDICINE

## 2024-07-31 PROCEDURE — 83036 HEMOGLOBIN GLYCOSYLATED A1C: CPT | Mod: ,,, | Performed by: CLINICAL MEDICAL LABORATORY

## 2024-07-31 RX ORDER — METFORMIN HYDROCHLORIDE 500 MG/1
500 TABLET ORAL 2 TIMES DAILY WITH MEALS
Qty: 180 TABLET | Refills: 0 | Status: SHIPPED | OUTPATIENT
Start: 2024-07-31

## 2024-07-31 RX ORDER — INSULIN GLARGINE 300 [IU]/ML
30 INJECTION, SOLUTION SUBCUTANEOUS NIGHTLY
Qty: 3 ML | Refills: 2 | Status: SHIPPED | OUTPATIENT
Start: 2024-07-31

## 2024-07-31 RX ORDER — PIOGLITAZONEHYDROCHLORIDE 15 MG/1
15 TABLET ORAL DAILY
Qty: 90 TABLET | Refills: 0 | Status: SHIPPED | OUTPATIENT
Start: 2024-07-31

## 2024-07-31 NOTE — PROGRESS NOTES
Dontrell Shetty MD   [facility]  [address] [place] [address]     PATIENT NAME: Milton Johnson  : 1945  DATE: 24  MRN: [id number]      Billing Provider: Dontrell Shetty MD  Level of Service: UT OFFICE/OUTPT VISIT, EST, LEVL IV, 30-39 MIN  Patient PCP Information       Provider PCP Type    Dontrell Shetty MD General            Reason for Visit / Chief Complaint: Hypertension (Check up; Labs; Refills. Patient's Norvasc 5mg discontinued along with Losartan 50mg in rehab due to hypotension. Patient treated for hypokalemia with 30days of K=+ 20meq. ), Diabetes, and Weight Loss (Periactin prescribed but not effective in increasing appetite. Spouse reports patient has lost 16lbs since 2024. Patient continues to reports poor appetite. )         History of Present Illness / Problem Focused Workflow     Milton Johnson presents to the clinic with Hypertension (Check up; Labs; Refills. Patient's Norvasc 5mg discontinued along with Losartan 50mg in rehab due to hypotension. Patient treated for hypokalemia with 30days of K=+ 20meq. ), Diabetes, and Weight Loss (Periactin prescribed but not effective in increasing appetite. Spouse reports patient has lost 16lbs since 2024. Patient continues to reports poor appetite. )     HPI    Review of Systems     Review of Systems   Constitutional:  Negative for activity change, appetite change, fatigue and fever.   HENT:  Negative for nasal congestion, ear pain, hearing loss, sinus pressure/congestion and sore throat.    Respiratory:  Negative for cough, chest tightness and shortness of breath.    Cardiovascular:  Negative for chest pain and palpitations.   Gastrointestinal:  Negative for abdominal pain and fecal incontinence.   Genitourinary:  Negative for bladder incontinence, difficulty urinating and erectile dysfunction.   Musculoskeletal:  Negative for arthralgias.   Integumentary:  Negative for rash.   Neurological:   "Negative for dizziness and headaches.        Medical / Social / Family History     Past Medical History:   Diagnosis Date    Diabetes mellitus     Diabetes mellitus, type 2     Glaucoma     High cholesterol     Hypertension        Past Surgical History:   Procedure Laterality Date    CATARACT EXTRACTION      EYE SURGERY      ORIF HIP FRACTURE Right 05/17/2024    Dr. Otero       Social History  Milton Johnson  reports that he has never smoked. He has been exposed to tobacco smoke. He has never used smokeless tobacco. He reports current alcohol use of about 1.0 standard drink of alcohol per week. He reports that he does not use drugs.    Family History  Mliton Johnson  family history is not on file. He was adopted.    Medications and Allergies     Medications  Outpatient Medications Marked as Taking for the 7/31/24 encounter (Office Visit) with Dontrell Shetty MD   Medication Sig Dispense Refill    aspirin (ECOTRIN) 81 MG EC tablet Take 81 mg by mouth 2 (two) times a day. For 30 days      bimatoprost (LUMIGAN) 0.01 % Drop Place 1 drop into both eyes every evening.      carvediloL (COREG) 3.125 MG tablet Take 3.125 mg by mouth 2 (two) times daily with meals.      cholecalciferol, vitamin D3, 125 mcg (5,000 unit) TbDL Take 5,000 Units by mouth once daily.      dorzolamide (TRUSOPT) 2 % ophthalmic solution Place 1 drop into both eyes 2 (two) times a day.      pen needle, diabetic 31 gauge x 1/4" Ndle Inject 2 each into the skin 2 (two) times a day. 100 each 3    rosuvastatin (CRESTOR) 40 MG Tab Take 40 mg by mouth every evening.      [DISCONTINUED] insulin glargine, TOUJEO, (TOUJEO SOLOSTAR U-300 INSULIN) 300 unit/mL (1.5 mL) InPn pen Inject 30 Units into the skin every evening. 3 mL 2    [DISCONTINUED] liraglutide 0.6 mg/0.1 mL, 18 mg/3 mL, subq PNIJ (VICTOZA 3-LORI) 0.6 mg/0.1 mL (18 mg/3 mL) PnIj pen Inject 1.8 mg into the skin once daily. 27 mL 0    [DISCONTINUED] metFORMIN (GLUCOPHAGE) 500 MG " tablet Take 1 tablet (500 mg total) by mouth 2 (two) times daily with meals. 180 tablet 3    [DISCONTINUED] pioglitazone (ACTOS) 15 MG tablet Take 1 tablet (15 mg total) by mouth once daily. 90 tablet 0       Allergies  Review of patient's allergies indicates:  No Known Allergies    Physical Examination     Vitals:    07/31/24 0852   BP: 122/82   Pulse: 71   Temp: 97.9 °F (36.6 °C)     Physical Exam  Constitutional:       General: He is not in acute distress.     Appearance: He is not ill-appearing.   HENT:      Head: Normocephalic and atraumatic.      Right Ear: Tympanic membrane and ear canal normal.      Left Ear: Tympanic membrane and ear canal normal.      Nose: Nose normal. No congestion or rhinorrhea.   Eyes:      Pupils: Pupils are equal, round, and reactive to light.   Cardiovascular:      Rate and Rhythm: Normal rate and regular rhythm.      Pulses: Normal pulses.      Heart sounds: No murmur heard.  Pulmonary:      Effort: No respiratory distress.      Breath sounds: No wheezing, rhonchi or rales.   Abdominal:      General: Bowel sounds are normal.      Palpations: Abdomen is soft.      Tenderness: There is no abdominal tenderness.      Hernia: No hernia is present.   Musculoskeletal:      Cervical back: Normal range of motion and neck supple.   Lymphadenopathy:      Cervical: No cervical adenopathy.   Skin:     General: Skin is warm and dry.   Neurological:      Mental Status: He is alert.   Psychiatric:         Behavior: Behavior normal.         Thought Content: Thought content normal.          Assessment and Plan (including Health Maintenance)   :    Plan:         Health Maintenance Due   Topic Date Due    Pneumococcal Vaccines (Age 65+) (1 of 2 - PCV) Never done    TETANUS VACCINE  Never done    Shingles Vaccine (1 of 2) Never done    RSV Vaccine (Age 60+ and Pregnant patients) (1 - 1-dose 60+ series) Never done    COVID-19 Vaccine (5 - 2023-24 season) 09/01/2023    Hemoglobin A1c  07/30/2024    Eye  Exam  10/03/2024       Problem List Items Addressed This Visit          Cardiac/Vascular    Hyperlipidemia    Relevant Orders    CBC Auto Differential    Comprehensive Metabolic Panel    Lipid Panel    Hypertension - Primary    Relevant Orders    CBC Auto Differential    Comprehensive Metabolic Panel    Lipid Panel       Endocrine    Type 2 diabetes mellitus with stage 3a chronic kidney disease, with long-term current use of insulin    Relevant Medications    pioglitazone (ACTOS) 15 MG tablet    metFORMIN (GLUCOPHAGE) 500 MG tablet    liraglutide 0.6 mg/0.1 mL, 18 mg/3 mL, subq PNIJ (VICTOZA 3-LORI) 0.6 mg/0.1 mL (18 mg/3 mL) PnIj pen    insulin glargine, TOUJEO, (TOUJEO SOLOSTAR U-300 INSULIN) 300 unit/mL (1.5 mL) InPn pen    Other Relevant Orders    CBC Auto Differential    Comprehensive Metabolic Panel    Lipid Panel    Hemoglobin A1C    Microalbumin/Creatinine Ratio, Urine     Other Visit Diagnoses       Hemoglobin A1c greater than 9.0%        Relevant Orders    Hemoglobin A1C    Weight loss        Relevant Orders    TSH    T4, Free          Hypertension, unspecified type  -     CBC Auto Differential; Future; Expected date: 07/31/2024  -     Comprehensive Metabolic Panel; Future; Expected date: 07/31/2024  -     Lipid Panel; Future; Expected date: 07/31/2024    Type 2 diabetes mellitus with stage 3a chronic kidney disease, with long-term current use of insulin  -     CBC Auto Differential; Future; Expected date: 07/31/2024  -     Comprehensive Metabolic Panel; Future; Expected date: 07/31/2024  -     Lipid Panel; Future; Expected date: 07/31/2024  -     Hemoglobin A1C; Future; Expected date: 07/31/2024  -     Microalbumin/Creatinine Ratio, Urine; Future; Expected date: 07/31/2024    Hemoglobin A1c greater than 9.0%  -     Hemoglobin A1C; Future; Expected date: 07/31/2024    Hyperlipidemia, unspecified hyperlipidemia type  -     CBC Auto Differential; Future; Expected date: 07/31/2024  -     Comprehensive  Metabolic Panel; Future; Expected date: 07/31/2024  -     Lipid Panel; Future; Expected date: 07/31/2024    Weight loss  -     TSH; Future; Expected date: 07/31/2024  -     T4, Free; Future; Expected date: 07/31/2024    Other orders  -     pioglitazone (ACTOS) 15 MG tablet; Take 1 tablet (15 mg total) by mouth once daily.  Dispense: 90 tablet; Refill: 0  -     metFORMIN (GLUCOPHAGE) 500 MG tablet; Take 1 tablet (500 mg total) by mouth 2 (two) times daily with meals.  Dispense: 180 tablet; Refill: 0  -     liraglutide 0.6 mg/0.1 mL, 18 mg/3 mL, subq PNIJ (VICTOZA 3-LORI) 0.6 mg/0.1 mL (18 mg/3 mL) PnIj pen; Inject 1.8 mg into the skin once daily.  Dispense: 27 mL; Refill: 0  -     insulin glargine, TOUJEO, (TOUJEO SOLOSTAR U-300 INSULIN) 300 unit/mL (1.5 mL) InPn pen; Inject 30 Units into the skin every evening.  Dispense: 3 mL; Refill: 2       Health Maintenance Topics with due status: Not Due       Topic Last Completion Date    Influenza Vaccine 10/19/2023    Diabetes Urine Screening 10/19/2023    Lipid Panel 01/24/2024       Procedures     Future Appointments   Date Time Provider Department Center   11/1/2024  8:20 AM Dontrell Shetty MD [facility] [place]   2/12/2025  1:45 PM [facility]        No follow-ups on file.       Signature:  Dontrell Shetty MD  [facility]  [address] [place] [address]  [phone] Phone  [phone] Fax    Date of encounter: 7/31/24

## 2024-08-01 LAB
EST. AVERAGE GLUCOSE BLD GHB EST-MCNC: 140 MG/DL
HBA1C MFR BLD HPLC: 6.5 % (ref 4.5–6.6)

## 2024-08-02 LAB
CREAT UR-MCNC: 118 MG/DL (ref 39–259)
MICROALBUMIN UR-MCNC: 21 MG/DL (ref 0–2.8)
MICROALBUMIN/CREAT RATIO PNL UR: 178 MG/G (ref 0–30)

## 2024-08-08 LAB
LEFT EYE DM RETINOPATHY: POSITIVE
RIGHT EYE DM RETINOPATHY: NEGATIVE

## 2024-11-01 ENCOUNTER — OFFICE VISIT (OUTPATIENT)
Dept: FAMILY MEDICINE | Facility: CLINIC | Age: 79
End: 2024-11-01
Payer: MEDICARE

## 2024-11-01 VITALS
WEIGHT: 216.63 LBS | DIASTOLIC BLOOD PRESSURE: 88 MMHG | BODY MASS INDEX: 28.71 KG/M2 | OXYGEN SATURATION: 98 % | TEMPERATURE: 98 F | SYSTOLIC BLOOD PRESSURE: 138 MMHG | HEIGHT: 73 IN | HEART RATE: 62 BPM

## 2024-11-01 DIAGNOSIS — N18.31 TYPE 2 DIABETES MELLITUS WITH STAGE 3A CHRONIC KIDNEY DISEASE, WITH LONG-TERM CURRENT USE OF INSULIN: ICD-10-CM

## 2024-11-01 DIAGNOSIS — R73.09 HEMOGLOBIN A1C LESS THAN 7.0%: ICD-10-CM

## 2024-11-01 DIAGNOSIS — I10 HYPERTENSION, UNSPECIFIED TYPE: Primary | ICD-10-CM

## 2024-11-01 DIAGNOSIS — Z79.4 TYPE 2 DIABETES MELLITUS WITH STAGE 3A CHRONIC KIDNEY DISEASE, WITH LONG-TERM CURRENT USE OF INSULIN: ICD-10-CM

## 2024-11-01 DIAGNOSIS — Z23 NEED FOR VACCINATION: ICD-10-CM

## 2024-11-01 DIAGNOSIS — E11.22 TYPE 2 DIABETES MELLITUS WITH STAGE 3A CHRONIC KIDNEY DISEASE, WITH LONG-TERM CURRENT USE OF INSULIN: ICD-10-CM

## 2024-11-01 LAB
ANION GAP SERPL CALCULATED.3IONS-SCNC: 9 MMOL/L (ref 7–16)
BASOPHILS # BLD AUTO: 0.07 K/UL (ref 0–0.2)
BASOPHILS NFR BLD AUTO: 0.8 % (ref 0–1)
BUN SERPL-MCNC: 25 MG/DL (ref 7–18)
BUN/CREAT SERPL: 24 (ref 6–20)
CALCIUM SERPL-MCNC: 9.1 MG/DL (ref 8.5–10.1)
CHLORIDE SERPL-SCNC: 106 MMOL/L (ref 98–107)
CO2 SERPL-SCNC: 27 MMOL/L (ref 21–32)
CREAT SERPL-MCNC: 1.05 MG/DL (ref 0.7–1.3)
DIFFERENTIAL METHOD BLD: ABNORMAL
EGFR (NO RACE VARIABLE) (RUSH/TITUS): 73 ML/MIN/1.73M2
EOSINOPHIL # BLD AUTO: 0.21 K/UL (ref 0–0.5)
EOSINOPHIL NFR BLD AUTO: 2.3 % (ref 1–4)
ERYTHROCYTE [DISTWIDTH] IN BLOOD BY AUTOMATED COUNT: 13.7 % (ref 11.5–14.5)
EST. AVERAGE GLUCOSE BLD GHB EST-MCNC: 169 MG/DL
GLUCOSE SERPL-MCNC: 110 MG/DL (ref 74–106)
HBA1C MFR BLD HPLC: 7.5 % (ref 4.5–6.6)
HCT VFR BLD AUTO: 43.8 % (ref 40–54)
HGB BLD-MCNC: 13.8 G/DL (ref 13.5–18)
IMM GRANULOCYTES # BLD AUTO: 0.03 K/UL (ref 0–0.04)
IMM GRANULOCYTES NFR BLD: 0.3 % (ref 0–0.4)
LYMPHOCYTES # BLD AUTO: 1.77 K/UL (ref 1–4.8)
LYMPHOCYTES NFR BLD AUTO: 19.4 % (ref 27–41)
MCH RBC QN AUTO: 26.9 PG (ref 27–31)
MCHC RBC AUTO-ENTMCNC: 31.5 G/DL (ref 32–36)
MCV RBC AUTO: 85.4 FL (ref 80–96)
MONOCYTES # BLD AUTO: 0.69 K/UL (ref 0–0.8)
MONOCYTES NFR BLD AUTO: 7.6 % (ref 2–6)
MPC BLD CALC-MCNC: 10.6 FL (ref 9.4–12.4)
NEUTROPHILS # BLD AUTO: 6.36 K/UL (ref 1.8–7.7)
NEUTROPHILS NFR BLD AUTO: 69.6 % (ref 53–65)
NRBC # BLD AUTO: 0 X10E3/UL
NRBC, AUTO (.00): 0 %
PLATELET # BLD AUTO: 250 K/UL (ref 150–400)
POTASSIUM SERPL-SCNC: 4.7 MMOL/L (ref 3.5–5.1)
RBC # BLD AUTO: 5.13 M/UL (ref 4.6–6.2)
SODIUM SERPL-SCNC: 137 MMOL/L (ref 136–145)
WBC # BLD AUTO: 9.13 K/UL (ref 4.5–11)

## 2024-11-01 PROCEDURE — 3079F DIAST BP 80-89 MM HG: CPT | Mod: ,,, | Performed by: FAMILY MEDICINE

## 2024-11-01 PROCEDURE — 80048 BASIC METABOLIC PNL TOTAL CA: CPT | Mod: ,,, | Performed by: CLINICAL MEDICAL LABORATORY

## 2024-11-01 PROCEDURE — 85025 COMPLETE CBC W/AUTO DIFF WBC: CPT | Mod: ,,, | Performed by: CLINICAL MEDICAL LABORATORY

## 2024-11-01 PROCEDURE — 1159F MED LIST DOCD IN RCRD: CPT | Mod: ,,, | Performed by: FAMILY MEDICINE

## 2024-11-01 PROCEDURE — 99214 OFFICE O/P EST MOD 30 MIN: CPT | Mod: ,,, | Performed by: FAMILY MEDICINE

## 2024-11-01 PROCEDURE — 1101F PT FALLS ASSESS-DOCD LE1/YR: CPT | Mod: ,,, | Performed by: FAMILY MEDICINE

## 2024-11-01 PROCEDURE — 90656 IIV3 VACC NO PRSV 0.5 ML IM: CPT | Mod: ,,, | Performed by: FAMILY MEDICINE

## 2024-11-01 PROCEDURE — 3288F FALL RISK ASSESSMENT DOCD: CPT | Mod: ,,, | Performed by: FAMILY MEDICINE

## 2024-11-01 PROCEDURE — 3075F SYST BP GE 130 - 139MM HG: CPT | Mod: ,,, | Performed by: FAMILY MEDICINE

## 2024-11-01 PROCEDURE — G0008 ADMIN INFLUENZA VIRUS VAC: HCPCS | Mod: ,,, | Performed by: FAMILY MEDICINE

## 2024-11-01 PROCEDURE — 83036 HEMOGLOBIN GLYCOSYLATED A1C: CPT | Mod: ,,, | Performed by: CLINICAL MEDICAL LABORATORY

## 2024-11-01 RX ORDER — PIOGLITAZONEHYDROCHLORIDE 15 MG/1
15 TABLET ORAL DAILY
Qty: 90 TABLET | Refills: 0 | Status: SHIPPED | OUTPATIENT
Start: 2024-11-01

## 2024-11-01 RX ORDER — INSULIN GLARGINE 300 [IU]/ML
30 INJECTION, SOLUTION SUBCUTANEOUS NIGHTLY
Qty: 3 ML | Refills: 2 | Status: SHIPPED | OUTPATIENT
Start: 2024-11-01

## 2024-11-01 RX ORDER — METFORMIN HYDROCHLORIDE 500 MG/1
500 TABLET ORAL 2 TIMES DAILY WITH MEALS
Qty: 180 TABLET | Refills: 0 | Status: SHIPPED | OUTPATIENT
Start: 2024-11-01

## 2024-11-01 RX ORDER — LIRAGLUTIDE 6 MG/ML
1.8 INJECTION SUBCUTANEOUS DAILY
Qty: 27 ML | Refills: 0 | Status: SHIPPED | OUTPATIENT
Start: 2024-11-01

## 2024-11-01 NOTE — PROGRESS NOTES
Dontrell Shetty MD   [facility]  [address] [place] [address]     PATIENT NAME: Milton Johnson  : 1945  DATE: 24  MRN: [id number]      Billing Provider: Dontrell Shetty MD  Level of Service: [place] OFFICE/OUTPT VISIT, EST, LEVL IV, 30-39 MIN  Patient PCP Information       Provider PCP Type    Dontrell Shetty MD General            Reason for Visit / Chief Complaint: Diabetes (Check up, labs, refills )         History of Present Illness / Problem Focused Workflow     Milton Johnson presents to the clinic with Diabetes (Check up, labs, refills )     HPI    Review of Systems     Review of Systems   Constitutional:  Negative for activity change, appetite change, fatigue and fever.   HENT:  Negative for nasal congestion, ear pain, hearing loss, sinus pressure/congestion and sore throat.    Respiratory:  Negative for cough, chest tightness and shortness of breath.    Cardiovascular:  Negative for chest pain and palpitations.   Gastrointestinal:  Negative for abdominal pain and fecal incontinence.   Genitourinary:  Negative for bladder incontinence, difficulty urinating and erectile dysfunction.   Musculoskeletal:  Negative for arthralgias.   Integumentary:  Negative for rash.   Neurological:  Negative for dizziness and headaches.        Medical / Social / Family History     Past Medical History:   Diagnosis Date    Diabetes mellitus     Diabetes mellitus, type 2     Glaucoma     High cholesterol     Hypertension        Past Surgical History:   Procedure Laterality Date    CATARACT EXTRACTION      EYE SURGERY      ORIF HIP FRACTURE Right 2024    Dr. Otero       Social History  Milton Johnson  reports that he has never smoked. He has been exposed to tobacco smoke. He has never used smokeless tobacco. He reports current alcohol use of about 1.0 standard drink of alcohol per week. He reports that he does not use drugs.    Family History  Milton  "Alex  family history is not on file. He was adopted.    Medications and Allergies     Medications  Outpatient Medications Marked as Taking for the 11/1/24 encounter (Office Visit) with Dontrell Shetty MD   Medication Sig Dispense Refill    aspirin (ECOTRIN) 81 MG EC tablet Take 81 mg by mouth 2 (two) times a day. For 30 days      bimatoprost (LUMIGAN) 0.01 % Drop Place 1 drop into both eyes every evening.      carvediloL (COREG) 3.125 MG tablet Take 3.125 mg by mouth 2 (two) times daily with meals.      cholecalciferol, vitamin D3, 125 mcg (5,000 unit) TbDL Take 5,000 Units by mouth once daily.      dorzolamide (TRUSOPT) 2 % ophthalmic solution Place 1 drop into both eyes 2 (two) times a day.      pen needle, diabetic 31 gauge x 1/4" Ndle Inject 2 each into the skin 2 (two) times a day. 100 each 3    rosuvastatin (CRESTOR) 40 MG Tab Take 40 mg by mouth every evening.      [DISCONTINUED] insulin glargine, TOUJEO, (TOUJEO SOLOSTAR U-300 INSULIN) 300 unit/mL (1.5 mL) InPn pen Inject 30 Units into the skin every evening. 3 mL 2    [DISCONTINUED] liraglutide 0.6 mg/0.1 mL, 18 mg/3 mL, subq PNIJ (VICTOZA 3-LORI) 0.6 mg/0.1 mL (18 mg/3 mL) PnIj pen Inject 1.8 mg into the skin once daily. 27 mL 0    [DISCONTINUED] metFORMIN (GLUCOPHAGE) 500 MG tablet Take 1 tablet (500 mg total) by mouth 2 (two) times daily with meals. 180 tablet 0    [DISCONTINUED] pioglitazone (ACTOS) 15 MG tablet Take 1 tablet (15 mg total) by mouth once daily. 90 tablet 0       Allergies  Review of patient's allergies indicates:  No Known Allergies    Physical Examination     Vitals:    11/01/24 0856   BP: 138/88   Pulse:    Temp:      Physical Exam  Constitutional:       General: He is not in acute distress.     Appearance: He is not ill-appearing.   HENT:      Head: Normocephalic and atraumatic.      Right Ear: Tympanic membrane and ear canal normal.      Left Ear: Tympanic membrane and ear canal normal.      Nose: Nose normal. No congestion " or rhinorrhea.   Eyes:      Pupils: Pupils are equal, round, and reactive to light.   Cardiovascular:      Rate and Rhythm: Normal rate and regular rhythm.      Pulses: Normal pulses.      Heart sounds: No murmur heard.  Pulmonary:      Effort: No respiratory distress.      Breath sounds: No wheezing, rhonchi or rales.   Abdominal:      General: Bowel sounds are normal.      Palpations: Abdomen is soft.      Tenderness: There is no abdominal tenderness.      Hernia: No hernia is present.   Musculoskeletal:      Cervical back: Normal range of motion and neck supple.   Lymphadenopathy:      Cervical: No cervical adenopathy.   Skin:     General: Skin is warm and dry.   Neurological:      Mental Status: He is alert.   Psychiatric:         Behavior: Behavior normal.         Thought Content: Thought content normal.          Assessment and Plan (including Health Maintenance)   :    Plan:         Health Maintenance Due   Topic Date Due    Pneumococcal Vaccines (Age 65+) (1 of 2 - PCV) Never done    TETANUS VACCINE  Never done    Shingles Vaccine (1 of 2) Never done    RSV Vaccine (Age 60+ and Pregnant patients) (1 - 1-dose 75+ series) Never done    COVID-19 Vaccine (5 - 2024-25 season) 09/01/2024       Problem List Items Addressed This Visit          Cardiac/Vascular    Hypertension - Primary    Relevant Orders    Basic Metabolic Panel (Completed)    CBC Auto Differential (Completed)       Endocrine    Type 2 diabetes mellitus with stage 3a chronic kidney disease, with long-term current use of insulin    Relevant Medications    insulin glargine, TOUJEO, (TOUJEO SOLOSTAR U-300 INSULIN) 300 unit/mL (1.5 mL) InPn pen    liraglutide 0.6 mg/0.1 mL, 18 mg/3 mL, subq PNIJ (VICTOZA 3-LORI) 0.6 mg/0.1 mL (18 mg/3 mL) PnIj pen    metFORMIN (GLUCOPHAGE) 500 MG tablet    pioglitazone (ACTOS) 15 MG tablet    Other Relevant Orders    Hemoglobin A1C (Completed)     Other Visit Diagnoses       Hemoglobin A1c less than 7.0%        Relevant  Orders    Hemoglobin A1C (Completed)    Need for vaccination        Relevant Medications    influenza (Flulaval, Fluzone, Fluarix) 45 mcg/0.5 mL IM vaccine (> or = 6 mo) 0.5 mL (Completed)          Hypertension, unspecified type  -     Basic Metabolic Panel; Future; Expected date: 11/01/2024  -     CBC Auto Differential; Future; Expected date: 11/01/2024    Type 2 diabetes mellitus with stage 3a chronic kidney disease, with long-term current use of insulin  -     Hemoglobin A1C; Future; Expected date: 11/01/2024    Hemoglobin A1c less than 7.0%  -     Hemoglobin A1C; Future; Expected date: 11/01/2024    Need for vaccination  -     influenza (Flulaval, Fluzone, Fluarix) 45 mcg/0.5 mL IM vaccine (> or = 6 mo) 0.5 mL    Other orders  -     insulin glargine, TOUJEO, (TOUJEO SOLOSTAR U-300 INSULIN) 300 unit/mL (1.5 mL) InPn pen; Inject 30 Units into the skin every evening.  Dispense: 3 mL; Refill: 2  -     liraglutide 0.6 mg/0.1 mL, 18 mg/3 mL, subq PNIJ (VICTOZA 3-LORI) 0.6 mg/0.1 mL (18 mg/3 mL) PnIj pen; Inject 1.8 mg into the skin once daily.  Dispense: 27 mL; Refill: 0  -     metFORMIN (GLUCOPHAGE) 500 MG tablet; Take 1 tablet (500 mg total) by mouth 2 (two) times daily with meals.  Dispense: 180 tablet; Refill: 0  -     pioglitazone (ACTOS) 15 MG tablet; Take 1 tablet (15 mg total) by mouth once daily.  Dispense: 90 tablet; Refill: 0       Health Maintenance Topics with due status: Not Due       Topic Last Completion Date    Diabetes Urine Screening 07/31/2024    Lipid Panel 07/31/2024    Eye Exam 08/08/2024    Hemoglobin A1c 11/01/2024       Procedures     Future Appointments   Date Time Provider Department Center   2/5/2025  8:00 AM Dontrell Shetty MD [facility]   2/12/2025  1:45 PM Deb, SANDRA Hicks [facility]        No follow-ups on file.       Signature:  Dontrell Shetty MD  [facility]  [address] [place] [address]  [phone]  Phone  [phone] Fax    Date of encounter: 11/1/24

## 2024-11-04 ENCOUNTER — PATIENT OUTREACH (OUTPATIENT)
Facility: HOSPITAL | Age: 79
End: 2024-11-04
Payer: MEDICARE

## 2024-11-04 NOTE — LETTER
AUTHORIZATION FOR RELEASE OF   CONFIDENTIAL INFORMATION    Dear Dr. Shannon,    We are seeing Samuel Johnson, date of birth 1945, in the clinic at [facility]. Dontrell Shetty MD is the patient's PCP. Samuel Johnson has an outstanding lab/procedure at the time we reviewed his chart. In order to help keep his health information updated, he has authorized us to request the following medical record(s):        (  )  MAMMOGRAM                                      (  )  COLONOSCOPY      (  )  PAP SMEAR                                          (  )  OUTSIDE LAB RESULTS     (  )  DEXA SCAN                                          ( X )  DM  EYE EXAM            (  )  FOOT EXAM                                          (  )  ENTIRE RECORD     (  )  OUTSIDE IMMUNIZATIONS                 (  )  _______________         Please fax records to Melodie Peña LPN, Care Coordinator at [phone].      If you have any questions, please contact Melodie at [phone]           Patient Name: Samuel Johnson  : 1945  Patient Phone #: [phone]            Samuel Johnson  MRN: [id number]  : 1945  Age: 78 y.o.  Sex: male         Patient/Legal Guardian Signature  This signature was collected at 2024    SAMUEL JOHNSON     Spouse/Significant Other  _______________________________   Printed Name/Relationship to Patient      Consent for Examination and Treatment: I hereby authorize the providers and employees of [facility] ([facility]) to provide medical treatment/services which includes, but is not limited to, performing and administering tests and diagnostic procedures that are deemed necessary, including, but not limited to, imaging examinations, blood tests and other laboratory procedures as may be required by the hospital, clinic, or may be ordered by my physician(s) or persons working under the general and/or special instructions of my physician(s).      I understand and agree that  this consent covers all authorized persons, including but not limited to physicians, residents, nurse practitioners, physicians' assistants, specialists, consultants, student nurses, and independently contracted physicians, who are called upon by the physician in charge, to carry out the diagnostic procedures and medical or surgical treatment.     I hereby authorize [facility] to retain or dispose of any specimens or tissue, should there be such remaining from any test or procedure.     I hereby authorize and give consent for [facility] providers and employees to take photographs, images or videotapes of such diagnostic, surgical or treatment procedures of Patient as may be required by [facility] or as may be ordered by a physician. I further acknowledge and agree that [facility] may use cameras or other devices for patient monitoring.     I am aware that the practice of medicine is not an exact science, and I acknowledge that no guarantees have been made to me as to the outcome of any tests, procedures or treatment.     Authorization for Release of Information: I understand that my insurance company and/or their agents may need information necessary to make determinations about payment/reimbursement. I hereby provide authorization to release to all insurance companies, their successors, assignees, other parties with whom they may have contracted, or others acting on their behalf, that are involved with payment for any hospital and/or clinic charges incurred by the patient, any information that they request and deem necessary for payment/reimbursement, and/or quality review.  I further authorize the release of my health information to physicians or other health care practitioners on staff who are involved in my health care now and in the future, and to other health care providers, entities, or institutions for the purpose of my continued care and treatment, including referrals.     REGISTRATION AUTHORIZATION  Form No. 37682  (Rev. 3/25/2024)    Page 1 of 3                       Medicare Patient's Certification and Authorization to Release Information and Payment Request:  I certify that the information given by me in applying for payment under Title XVIII of the Social Security Act is correct. I authorize any solares of medical or other information about me to release to the Social SecurityAdministration, or its intermediaries or carriers, any information needed for this or a related Medicare claim. I request that payment of authorized benefits be made on my behalf.     Assignment of Insurance Benefits:   I hereby authorize any and all insurance companies, health plans, defined   benefit plans, health insurers or any entity that is or may be responsible for payment of my medical expenses to pay all hospital and medical benefits now due, and to become due and payable to me under any hospital benefits, sick benefits, injury benefits or any other benefit for services rendered to me, including Major Medical Benefits, direct to [facility] and all independently contracted physicians. I assign any and all rights that I may have against any and all insurance companies, health plans, defined benefit plans, health insurers or any entity that is or may be responsible for payment of my medical expenses, including, but not limited to any right to appeal a denial of a claim, any right to bring any action, lawsuit, administrative proceeding, or other cause of action on my behalf. I specifically assign my right to pursue litigation against any and all insurance companies, health plans, defined benefit plans, health insurers or any entity that is or may be responsible for payment of my medical expenses based upon a refusal to pay charges.            E. Valuables: It is understood and agreed that [facility] is not liable for the damage to or loss of any money, jewelry,   documents, dentures, eye glasses, hearing aids, prosthetics, or other property of value.      F. Computer Equipment: I understand and agree that should I choose to use computer equipment owned by [facility] or if I choose to access the Internet via [facility]s network, I do so at my own risk. [facility] is not responsible for any damage to my computer equipment or to any damages of any type that might arise from my loss of equipment or data.     G. Acceptance of Financial Responsibility:  I agree that in consideration of the services and   supplies that have been   or will be furnished to the patient, I am hereby obligated to pay all charges made for or on the account of the patient according to the standard rates (in effect at the time the services and supplies are delivered) established by [facility], including its Patient Financial Assistance Policy to the extent it is applicable. I understand that I am responsible for all charges, or portions thereof, not covered by insurance or other sources. Patient refunds will be distributed only after balances at all [facility] facilities are paid.     H. Communication Authorization:  I hereby authorize [facility] and its representatives, along with any billing service   or  who may work on their behalf, to contact me on   my cell phone and/or home phone using pre- recorded messages, artificial voice messages, automatic telephone dialing devices or other computer assisted technology, or by electronic      mail, text messaging, or by any other form of electronic communication. This includes, but is not limited to, appointment reminders, yearly physical exam reminders, preventive care reminders, patient campaigns, welcome calls, and calls about account balances on my account or any account on which I am listed as a guarantor. I understand I have the right to opt out of these communications at any time.      Relationship  Between  Facility and  Provider:      I understand that some, but not all, providers furnishing services to the patient are not employees or agents  of [facility]. The patient is under the care and supervision of his/her attending physician, and it is the responsibility of the facility and its nursing staff to carry out the instructions of such physicians. It is the responsibility of the patient's physician/designee to obtain the patient's informed consent, when required, for medical or surgical treatment, special diagnostic or therapeutic procedures, or hospital services rendered for the patient under the special instructions of the physician/designee.           REGISTRATION AUTHORIZATION  Form No. 84498 (Rev. 3/25/2024)    Page 2 of 3                       Immunizations: [facility] shares immunization information with state sponsored health departments to help you and your doctor keep track of your immunization records. By signing, you consent to have this information shared with the health department in your state:                                [place] - LINKS ([place] Immunization Network for Kids Statewide)                                [place] - MIIX ([place] Immunization Information eXchange)                                [place] - ImmPRINT (Immunization Patient Registry with Integrated Technology)     TERM: This authorization is valid for this and subsequent care/treatment I receive at [facility] and will remain valid unless/until revoked in writing by me.     [facility]: As used in this document, [facility] means all [facility] owned and managed facilities, including, but not limited to, all health centers, surgery centers, clinics, urgent care centers, and hospitals.         [facility] complies with applicable Federal civil rights laws and does not discriminate on the basis of race, color, national origin, age, disability, or sex.  ATENCIÓN: si habla prem, tiene a byrne disposición servicios gratuitos de asistencia lingüística. Mervin saez [phone].  [facility] Ý: N?u b?n nói Ti?ng Vi?t, có các d?ch v? h? tr? ngôn ng? mi?n phí  dành cho b?n. G?i s? [phone].        REGISTRATION AUTHORIZATION  Form No. 81756 (Rev. 3/25/2024)   Page 3 of 3

## 2024-11-08 ENCOUNTER — PATIENT OUTREACH (OUTPATIENT)
Facility: HOSPITAL | Age: 79
End: 2024-11-08
Payer: MEDICARE

## 2025-01-07 DIAGNOSIS — Z79.4 TYPE 2 DIABETES MELLITUS WITH STAGE 3A CHRONIC KIDNEY DISEASE, WITH LONG-TERM CURRENT USE OF INSULIN: Primary | ICD-10-CM

## 2025-01-07 DIAGNOSIS — N18.31 TYPE 2 DIABETES MELLITUS WITH STAGE 3A CHRONIC KIDNEY DISEASE, WITH LONG-TERM CURRENT USE OF INSULIN: Primary | ICD-10-CM

## 2025-01-07 DIAGNOSIS — E11.22 TYPE 2 DIABETES MELLITUS WITH STAGE 3A CHRONIC KIDNEY DISEASE, WITH LONG-TERM CURRENT USE OF INSULIN: Primary | ICD-10-CM

## 2025-01-07 RX ORDER — SEMAGLUTIDE 0.68 MG/ML
INJECTION, SOLUTION SUBCUTANEOUS
COMMUNITY
End: 2025-01-07 | Stop reason: SDUPTHER

## 2025-01-07 RX ORDER — SEMAGLUTIDE 0.68 MG/ML
INJECTION, SOLUTION SUBCUTANEOUS
Qty: 3 ML | Refills: 3 | Status: SHIPPED | OUTPATIENT
Start: 2025-01-07

## 2025-01-07 NOTE — TELEPHONE ENCOUNTER
Patient's Victoza will no longer be covered by his insurance. Alternatives are Mounjaro, Ozempic or Trulicity.    Will start Ozempic 0.25 mg every 7 days x 1 month and then increase to 0.5 mg every 7 days. Patient notified, verbalized understanding.

## 2025-02-05 ENCOUNTER — OFFICE VISIT (OUTPATIENT)
Dept: FAMILY MEDICINE | Facility: CLINIC | Age: 80
End: 2025-02-05
Payer: MEDICARE

## 2025-02-05 VITALS
HEIGHT: 73 IN | OXYGEN SATURATION: 97 % | TEMPERATURE: 98 F | HEART RATE: 89 BPM | WEIGHT: 225 LBS | DIASTOLIC BLOOD PRESSURE: 94 MMHG | SYSTOLIC BLOOD PRESSURE: 152 MMHG | BODY MASS INDEX: 29.82 KG/M2

## 2025-02-05 DIAGNOSIS — E11.22 TYPE 2 DIABETES MELLITUS WITH STAGE 3A CHRONIC KIDNEY DISEASE, WITH LONG-TERM CURRENT USE OF INSULIN: ICD-10-CM

## 2025-02-05 DIAGNOSIS — N18.31 TYPE 2 DIABETES MELLITUS WITH STAGE 3A CHRONIC KIDNEY DISEASE, WITH LONG-TERM CURRENT USE OF INSULIN: ICD-10-CM

## 2025-02-05 DIAGNOSIS — I10 HYPERTENSION, UNSPECIFIED TYPE: Primary | ICD-10-CM

## 2025-02-05 DIAGNOSIS — Z79.4 TYPE 2 DIABETES MELLITUS WITH STAGE 3A CHRONIC KIDNEY DISEASE, WITH LONG-TERM CURRENT USE OF INSULIN: ICD-10-CM

## 2025-02-05 DIAGNOSIS — Z12.5 SCREENING FOR MALIGNANT NEOPLASM OF PROSTATE: ICD-10-CM

## 2025-02-05 DIAGNOSIS — R73.9 HEMOGLOBIN A1C BETWEEN 7.0% AND 9.0%: ICD-10-CM

## 2025-02-05 DIAGNOSIS — E78.5 HYPERLIPIDEMIA, UNSPECIFIED HYPERLIPIDEMIA TYPE: ICD-10-CM

## 2025-02-05 LAB
ALBUMIN SERPL BCP-MCNC: 3.9 G/DL (ref 3.4–4.8)
ALBUMIN/GLOB SERPL: 1.6 {RATIO}
ALP SERPL-CCNC: 71 U/L (ref 40–150)
ALT SERPL W P-5'-P-CCNC: 14 U/L
ANION GAP SERPL CALCULATED.3IONS-SCNC: 14 MMOL/L (ref 7–16)
AST SERPL W P-5'-P-CCNC: 12 U/L (ref 5–34)
BASOPHILS # BLD AUTO: 0.08 K/UL (ref 0–0.2)
BASOPHILS NFR BLD AUTO: 1 % (ref 0–1)
BILIRUB SERPL-MCNC: 0.5 MG/DL
BUN SERPL-MCNC: 19 MG/DL (ref 8–26)
BUN/CREAT SERPL: 13 (ref 6–20)
CALCIUM SERPL-MCNC: 9.3 MG/DL (ref 8.8–10)
CHLORIDE SERPL-SCNC: 104 MMOL/L (ref 98–107)
CHOLEST SERPL-MCNC: 97 MG/DL
CHOLEST/HDLC SERPL: 2.4 {RATIO}
CO2 SERPL-SCNC: 23 MMOL/L (ref 23–31)
CREAT SERPL-MCNC: 1.52 MG/DL (ref 0.72–1.25)
DIFFERENTIAL METHOD BLD: ABNORMAL
EGFR (NO RACE VARIABLE) (RUSH/TITUS): 47 ML/MIN/1.73M2
EOSINOPHIL # BLD AUTO: 0.19 K/UL (ref 0–0.5)
EOSINOPHIL NFR BLD AUTO: 2.3 % (ref 1–4)
ERYTHROCYTE [DISTWIDTH] IN BLOOD BY AUTOMATED COUNT: 14.7 % (ref 11.5–14.5)
EST. AVERAGE GLUCOSE BLD GHB EST-MCNC: 197 MG/DL
GLOBULIN SER-MCNC: 2.4 G/DL (ref 2–4)
GLUCOSE SERPL-MCNC: 118 MG/DL (ref 82–115)
HBA1C MFR BLD HPLC: 8.5 %
HCT VFR BLD AUTO: 46.7 % (ref 40–54)
HDLC SERPL-MCNC: 40 MG/DL (ref 35–60)
HGB BLD-MCNC: 15.1 G/DL (ref 13.5–18)
IMM GRANULOCYTES # BLD AUTO: 0.02 K/UL (ref 0–0.04)
IMM GRANULOCYTES NFR BLD: 0.2 % (ref 0–0.4)
LDLC SERPL CALC-MCNC: 45 MG/DL
LDLC/HDLC SERPL: 1.1 {RATIO}
LYMPHOCYTES # BLD AUTO: 1.64 K/UL (ref 1–4.8)
LYMPHOCYTES NFR BLD AUTO: 19.7 % (ref 27–41)
MCH RBC QN AUTO: 27.7 PG (ref 27–31)
MCHC RBC AUTO-ENTMCNC: 32.3 G/DL (ref 32–36)
MCV RBC AUTO: 85.7 FL (ref 80–96)
MONOCYTES # BLD AUTO: 0.61 K/UL (ref 0–0.8)
MONOCYTES NFR BLD AUTO: 7.3 % (ref 2–6)
MPC BLD CALC-MCNC: 10.7 FL (ref 9.4–12.4)
NEUTROPHILS # BLD AUTO: 5.8 K/UL (ref 1.8–7.7)
NEUTROPHILS NFR BLD AUTO: 69.5 % (ref 53–65)
NONHDLC SERPL-MCNC: 57 MG/DL
NRBC # BLD AUTO: 0 X10E3/UL
NRBC, AUTO (.00): 0 %
PLATELET # BLD AUTO: 226 K/UL (ref 150–400)
POTASSIUM SERPL-SCNC: 4.6 MMOL/L (ref 3.5–5.1)
PROT SERPL-MCNC: 6.3 G/DL (ref 5.8–7.6)
PSA SERPL-MCNC: 1.82 NG/ML
RBC # BLD AUTO: 5.45 M/UL (ref 4.6–6.2)
SODIUM SERPL-SCNC: 136 MMOL/L (ref 136–145)
TRIGL SERPL-MCNC: 62 MG/DL (ref 34–140)
VLDLC SERPL-MCNC: 12 MG/DL
WBC # BLD AUTO: 8.34 K/UL (ref 4.5–11)

## 2025-02-05 PROCEDURE — 83036 HEMOGLOBIN GLYCOSYLATED A1C: CPT | Mod: ,,, | Performed by: CLINICAL MEDICAL LABORATORY

## 2025-02-05 PROCEDURE — G0103 PSA SCREENING: HCPCS | Mod: ,,, | Performed by: CLINICAL MEDICAL LABORATORY

## 2025-02-05 PROCEDURE — 80053 COMPREHEN METABOLIC PANEL: CPT | Mod: ,,, | Performed by: CLINICAL MEDICAL LABORATORY

## 2025-02-05 PROCEDURE — 80061 LIPID PANEL: CPT | Mod: ,,, | Performed by: CLINICAL MEDICAL LABORATORY

## 2025-02-05 PROCEDURE — 85025 COMPLETE CBC W/AUTO DIFF WBC: CPT | Mod: ,,, | Performed by: CLINICAL MEDICAL LABORATORY

## 2025-02-05 RX ORDER — METFORMIN HYDROCHLORIDE 500 MG/1
500 TABLET ORAL 2 TIMES DAILY WITH MEALS
Qty: 180 TABLET | Refills: 0 | Status: SHIPPED | OUTPATIENT
Start: 2025-02-05

## 2025-02-05 RX ORDER — SEMAGLUTIDE 0.68 MG/ML
INJECTION, SOLUTION SUBCUTANEOUS
Qty: 3 ML | Refills: 3 | Status: SHIPPED | OUTPATIENT
Start: 2025-02-05

## 2025-02-05 RX ORDER — INSULIN GLARGINE 300 [IU]/ML
30 INJECTION, SOLUTION SUBCUTANEOUS NIGHTLY
Qty: 3 ML | Refills: 2 | Status: SHIPPED | OUTPATIENT
Start: 2025-02-05

## 2025-02-05 RX ORDER — PIOGLITAZONEHYDROCHLORIDE 15 MG/1
15 TABLET ORAL DAILY
Qty: 90 TABLET | Refills: 0 | Status: SHIPPED | OUTPATIENT
Start: 2025-02-05

## 2025-02-19 ENCOUNTER — CLINICAL SUPPORT (OUTPATIENT)
Dept: FAMILY MEDICINE | Facility: CLINIC | Age: 80
End: 2025-02-19
Payer: MEDICARE

## 2025-02-19 VITALS — SYSTOLIC BLOOD PRESSURE: 138 MMHG | HEART RATE: 68 BPM | OXYGEN SATURATION: 98 % | DIASTOLIC BLOOD PRESSURE: 82 MMHG

## 2025-02-19 DIAGNOSIS — I10 HYPERTENSION, UNSPECIFIED TYPE: Primary | ICD-10-CM

## 2025-02-19 NOTE — PROGRESS NOTES
Patient here for BP recheck. /82 in left arm. Pulse 68. States he took his losartan/HCTZ 100-25 mg around once hour ago. Instructed to call or RTC as needed.

## 2025-02-28 NOTE — PROGRESS NOTES
Dontrell Shetty MD   [facility]  [address] [place] [address]     PATIENT NAME: Milton Johnson  : 1945  DATE: 25  MRN: [id number]      Billing Provider: Dontrell Shetty MD  Level of Service: ME OFFICE/OUTPT VISIT, EST, LEVL IV, 30-39 MIN  Patient PCP Information       Provider PCP Type    Dontrell Shetty MD General            Reason for Visit / Chief Complaint: Diabetes (Check up, labs, refills )         History of Present Illness / Problem Focused Workflow     Milton Johnson presents to the clinic with Diabetes (Check up, labs, refills )     HPI    Review of Systems     Review of Systems   Constitutional:  Negative for activity change, appetite change, fatigue and fever.   HENT:  Negative for nasal congestion, ear pain, hearing loss, sinus pressure/congestion and sore throat.    Respiratory:  Negative for cough, chest tightness and shortness of breath.    Cardiovascular:  Negative for chest pain and palpitations.   Gastrointestinal:  Negative for abdominal pain and fecal incontinence.   Genitourinary:  Negative for bladder incontinence, difficulty urinating and erectile dysfunction.   Musculoskeletal:  Negative for arthralgias.   Integumentary:  Negative for rash.   Neurological:  Negative for dizziness and headaches.        Medical / Social / Family History     Past Medical History:   Diagnosis Date    Diabetes mellitus     Diabetes mellitus, type 2     Glaucoma     High cholesterol     Hypertension        Past Surgical History:   Procedure Laterality Date    CATARACT EXTRACTION      EYE SURGERY      ORIF HIP FRACTURE Right 2024    Dr. Otero       Social History  Milton Johnson  reports that he has never smoked. He has been exposed to tobacco smoke. He has never used smokeless tobacco. He reports current alcohol use of about 1.0 standard drink of alcohol per week. He reports that he does not use drugs.    Family History  Milton  "Alex  family history is not on file. He was adopted.    Medications and Allergies     Medications  Outpatient Medications Marked as Taking for the 2/5/25 encounter (Office Visit) with Dontrell Shetty MD   Medication Sig Dispense Refill    aspirin (ECOTRIN) 81 MG EC tablet Take 81 mg by mouth 2 (two) times a day. For 30 days      bimatoprost (LUMIGAN) 0.01 % Drop Place 1 drop into both eyes every evening.      carvediloL (COREG) 3.125 MG tablet Take 3.125 mg by mouth 2 (two) times daily with meals.      cholecalciferol, vitamin D3, 125 mcg (5,000 unit) TbDL Take 5,000 Units by mouth once daily.      dorzolamide (TRUSOPT) 2 % ophthalmic solution Place 1 drop into both eyes 2 (two) times a day.      pen needle, diabetic 31 gauge x 1/4" Ndle Inject 2 each into the skin 2 (two) times a day. 100 each 3    rosuvastatin (CRESTOR) 40 MG Tab Take 40 mg by mouth every evening.      [DISCONTINUED] insulin glargine, TOUJEO, (TOUJEO SOLOSTAR U-300 INSULIN) 300 unit/mL (1.5 mL) InPn pen Inject 30 Units into the skin every evening. 3 mL 2    [DISCONTINUED] metFORMIN (GLUCOPHAGE) 500 MG tablet Take 1 tablet (500 mg total) by mouth 2 (two) times daily with meals. 180 tablet 0    [DISCONTINUED] pioglitazone (ACTOS) 15 MG tablet Take 1 tablet (15 mg total) by mouth once daily. 90 tablet 0    [DISCONTINUED] semaglutide (OZEMPIC) 0.25 mg or 0.5 mg (2 mg/3 mL) pen injector 0.25 mg subcutaneous every 7 days x 1 month, then increase to 0.5 mg subcutaneous every 7 days 3 mL 3       Allergies  Review of patient's allergies indicates:  No Known Allergies    Physical Examination     Vitals:    02/05/25 0835   BP: (!) 152/94   Pulse:    Temp:      Physical Exam  Constitutional:       General: He is not in acute distress.     Appearance: He is not ill-appearing.   HENT:      Head: Normocephalic and atraumatic.      Right Ear: Tympanic membrane and ear canal normal.      Left Ear: Tympanic membrane and ear canal normal.      Nose: Nose " normal. No congestion or rhinorrhea.   Eyes:      Pupils: Pupils are equal, round, and reactive to light.   Cardiovascular:      Rate and Rhythm: Normal rate and regular rhythm.      Pulses: Normal pulses.      Heart sounds: No murmur heard.  Pulmonary:      Effort: No respiratory distress.      Breath sounds: No wheezing, rhonchi or rales.   Abdominal:      General: Bowel sounds are normal.      Palpations: Abdomen is soft.      Tenderness: There is no abdominal tenderness.      Hernia: No hernia is present.   Musculoskeletal:      Cervical back: Normal range of motion and neck supple.   Lymphadenopathy:      Cervical: No cervical adenopathy.   Skin:     General: Skin is warm and dry.   Neurological:      Mental Status: He is alert.   Psychiatric:         Behavior: Behavior normal.         Thought Content: Thought content normal.          Assessment and Plan (including Health Maintenance)   :    Plan:         Health Maintenance Due   Topic Date Due    TETANUS VACCINE  Never done    Pneumococcal Vaccines (Age 50+) (1 of 2 - PCV) Never done    Shingles Vaccine (1 of 2) Never done    RSV Vaccine (Age 60+ and Pregnant patients) (1 - 1-dose 75+ series) Never done    COVID-19 Vaccine (5 - 2024-25 season) 09/01/2024       Problem List Items Addressed This Visit          Cardiac/Vascular    Hyperlipidemia    Relevant Orders    CBC Auto Differential (Completed)    Comprehensive Metabolic Panel (Completed)    Lipid Panel (Completed)    Hypertension - Primary    Relevant Orders    CBC Auto Differential (Completed)    Comprehensive Metabolic Panel (Completed)       Endocrine    Type 2 diabetes mellitus with stage 3a chronic kidney disease, with long-term current use of insulin    Relevant Medications    insulin glargine, TOUJEO, (TOUJEO SOLOSTAR U-300 INSULIN) 300 unit/mL (1.5 mL) InPn pen    metFORMIN (GLUCOPHAGE) 500 MG tablet    pioglitazone (ACTOS) 15 MG tablet    semaglutide (OZEMPIC) 0.25 mg or 0.5 mg (2 mg/3 mL) pen  injector    Other Relevant Orders    Hemoglobin A1C (Completed)     Other Visit Diagnoses         Hemoglobin A1c between 7.0% and 9.0%        Relevant Orders    Hemoglobin A1C (Completed)      Screening for malignant neoplasm of prostate        Relevant Orders    PSA, Screening (Completed)          Hypertension, unspecified type  -     CBC Auto Differential; Future; Expected date: 02/05/2025  -     Comprehensive Metabolic Panel; Future; Expected date: 02/05/2025    Hyperlipidemia, unspecified hyperlipidemia type  -     CBC Auto Differential; Future; Expected date: 02/05/2025  -     Comprehensive Metabolic Panel; Future; Expected date: 02/05/2025  -     Lipid Panel; Future; Expected date: 02/05/2025    Type 2 diabetes mellitus with stage 3a chronic kidney disease, with long-term current use of insulin  -     Hemoglobin A1C; Future; Expected date: 02/05/2025  -     semaglutide (OZEMPIC) 0.25 mg or 0.5 mg (2 mg/3 mL) pen injector; 0.25 mg subcutaneous every 7 days x 1 month, then increase to 0.5 mg subcutaneous every 7 days  Dispense: 3 mL; Refill: 3    Hemoglobin A1c between 7.0% and 9.0%  -     Hemoglobin A1C; Future; Expected date: 02/05/2025    Screening for malignant neoplasm of prostate  -     PSA, Screening; Future; Expected date: 02/05/2025    Other orders  -     insulin glargine, TOUJEO, (TOUJEO SOLOSTAR U-300 INSULIN) 300 unit/mL (1.5 mL) InPn pen; Inject 30 Units into the skin every evening.  Dispense: 3 mL; Refill: 2  -     metFORMIN (GLUCOPHAGE) 500 MG tablet; Take 1 tablet (500 mg total) by mouth 2 (two) times daily with meals.  Dispense: 180 tablet; Refill: 0  -     pioglitazone (ACTOS) 15 MG tablet; Take 1 tablet (15 mg total) by mouth once daily.  Dispense: 90 tablet; Refill: 0       Health Maintenance Topics with due status: Not Due       Topic Last Completion Date    Diabetes Urine Screening 07/31/2024    Diabetic Eye Exam 08/08/2024    Lipid Panel 02/05/2025    Hemoglobin A1c 02/05/2025        Procedures     Future Appointments   Date Time Provider Department Center   3/12/2025  8:00 AM [facility] [place]   5/7/2025  7:40 AM Dontrell Shetty MD [facility]        No follow-ups on file.       Signature:  Dontrell Shetty MD  [facility]  [address] [place] [address]  [phone] Phone  [phone] Fax    Date of encounter: 2/5/25

## 2025-04-03 LAB
LEFT EYE DM RETINOPATHY: NEGATIVE
RIGHT EYE DM RETINOPATHY: NEGATIVE

## 2025-04-10 ENCOUNTER — PATIENT OUTREACH (OUTPATIENT)
Facility: HOSPITAL | Age: 80
End: 2025-04-10
Payer: MEDICARE

## 2025-04-10 NOTE — PROGRESS NOTES
Population Health Chart Review & Patient Outreach Details      Further Action Needed If Patient Returns Outreach:        Health Maintenance Due   Topic Date Due    TETANUS VACCINE  Never done    Pneumococcal Vaccines (Age 50+) (1 of 2 - PCV) Never done    Shingles Vaccine (1 of 2) Never done    RSV Vaccine (Age 60+ and Pregnant patients) (1 - 1-dose 75+ series) Never done    COVID-19 Vaccine ( season) 2024    Hemoglobin A1c  2025          Updates Requested / Reviewed:     [x]  Care Everywhere    [x]     []  External Sources ([facility], [facility], [facility], etc.)    [] [facility]   [] [facility]   [] Other:    [x]  Care Team Updated   []  Removed  or Duplicate Orders   []  Immunization Reconciliation Completed / Queried    [] [place]   [] [place]   [] [place]   [] [place]      Health Maintenance Topics Addressed and Outreach Outcomes / Actions Taken:             Breast Cancer Screening []  Mammogram Order Placed    []  Mammogram Screening Scheduled    []  External Records Requested & Care Team Updated if Applicable    []  External Records Uploaded & Care Team Updated if Applicable    []  Pt Declined Scheduling Mammogram    []  Pt Will Schedule with External Provider / Order Routed & Care Team Updated if Applicable              Cervical Cancer Screening []  Pap Smear Scheduled in Primary Care or OBGYN    []  External Records Requested & Care Team Updated if Applicable       []  External Records Uploaded, Care Team Updated, & History Updated if Applicable    []  Patient Declined Scheduling Pap Smear    []  Patient Will Schedule with External Provider & Care Team Updated if Applicable                  Colorectal Cancer Screening []  Colonoscopy Case Request / Referral / Home Test Order Placed    []  External Records Requested & Care Team Updated if Applicable    []  External Records Uploaded, Care Team Updated, & History Updated if Applicable    []  Patient Declined Completing Colon Cancer  Screening    []  Patient Will Schedule with External Provider & Care Team Updated if Applicable    []  Fit Kit Mailed (add the SmartPhrase under additional notes)    []  Reminded Patient to Complete Home Test                Diabetic Eye Exam []  Eye Exam Screening Order Placed    []  Eye Camera Scheduled or Optometry/Ophthalmology Referral Placed    []  External Records Requested & Care Team Updated if Applicable    [x]  External Records Uploaded, Care Team Updated, & History Updated if Applicable    []  Patient Declined Scheduling Eye Exam    []  Patient Will Schedule with External Provider & Care Team Updated if Applicable             Blood Pressure Control []  Primary Care Follow Up Visit Scheduled     []  Remote Blood Pressure Reading Captured    []  Patient Declined Remote Reading or Scheduling Appt - Escalated to PCP    []  Patient Will Call Back or Send Portal Message with Reading                 HbA1c & Other Labs []  Overdue Lab(s) Ordered    []  Overdue Lab(s) Scheduled    []  External Records Uploaded & Care Team Updated if Applicable    []  Primary Care Follow Up Visit Scheduled     []  Reminded Patient to Complete A1c Home Test    []  Patient Declined Scheduling Labs or Will Call Back to Schedule    []  Patient Will Schedule with External Provider / Order Routed, & Care Team Updated if Applicable           Primary Care Appointment []  Primary Care Appt Scheduled    []  Patient Declined Scheduling or Will Call Back to Schedule    []  Pt Established with External Provider, Updated Care Team, & Informed Pt to Notify Payor if Applicable           Medication Adherence /    Statin Use []  Primary Care Appointment Scheduled    []  Patient Reminded to  Prescription    []  Patient Declined, Provider Notified if Needed    []  Sent Provider Message to Review to Evaluate Pt for Statin, Add Exclusion Dx Codes, Document   Exclusion in Problem List, Change Statin Intensity Level to Moderate or High Intensity if  Applicable                Osteoporosis Screening []  Dexa Order Placed    []  Dexa Appointment Scheduled    []  External Records Requested & Care Team Updated    []  External Records Uploaded, Care Team Updated, & History Updated if Applicable    []  Patient Declined Scheduling Dexa or Will Call Back to Schedule    []  Patient Will Schedule with External Provider / Order Routed & Care Team Updated if Applicable       Additional Notes:

## 2025-05-07 ENCOUNTER — OFFICE VISIT (OUTPATIENT)
Dept: FAMILY MEDICINE | Facility: CLINIC | Age: 80
End: 2025-05-07
Payer: MEDICARE

## 2025-05-07 VITALS
HEART RATE: 69 BPM | WEIGHT: 224.63 LBS | HEIGHT: 73 IN | TEMPERATURE: 98 F | DIASTOLIC BLOOD PRESSURE: 86 MMHG | OXYGEN SATURATION: 96 % | SYSTOLIC BLOOD PRESSURE: 130 MMHG | BODY MASS INDEX: 29.77 KG/M2

## 2025-05-07 DIAGNOSIS — I10 HYPERTENSION, UNSPECIFIED TYPE: Primary | ICD-10-CM

## 2025-05-07 DIAGNOSIS — Z79.4 TYPE 2 DIABETES MELLITUS WITH STAGE 3A CHRONIC KIDNEY DISEASE, WITH LONG-TERM CURRENT USE OF INSULIN: ICD-10-CM

## 2025-05-07 DIAGNOSIS — N18.31 TYPE 2 DIABETES MELLITUS WITH STAGE 3A CHRONIC KIDNEY DISEASE, WITH LONG-TERM CURRENT USE OF INSULIN: ICD-10-CM

## 2025-05-07 DIAGNOSIS — E11.22 TYPE 2 DIABETES MELLITUS WITH STAGE 3A CHRONIC KIDNEY DISEASE, WITH LONG-TERM CURRENT USE OF INSULIN: ICD-10-CM

## 2025-05-07 DIAGNOSIS — R73.9 HEMOGLOBIN A1C BETWEEN 7.0% AND 9.0%: ICD-10-CM

## 2025-05-07 LAB
ANION GAP SERPL CALCULATED.3IONS-SCNC: 13 MMOL/L (ref 7–16)
BASOPHILS # BLD AUTO: 0.08 K/UL (ref 0–0.2)
BASOPHILS NFR BLD AUTO: 0.8 % (ref 0–1)
BUN SERPL-MCNC: 26 MG/DL (ref 8–26)
BUN/CREAT SERPL: 17 (ref 6–20)
CALCIUM SERPL-MCNC: 9.4 MG/DL (ref 8.8–10)
CHLORIDE SERPL-SCNC: 103 MMOL/L (ref 98–107)
CO2 SERPL-SCNC: 24 MMOL/L (ref 23–31)
CREAT SERPL-MCNC: 1.53 MG/DL (ref 0.72–1.25)
DIFFERENTIAL METHOD BLD: ABNORMAL
EGFR (NO RACE VARIABLE) (RUSH/TITUS): 46 ML/MIN/1.73M2
EOSINOPHIL # BLD AUTO: 0.14 K/UL (ref 0–0.5)
EOSINOPHIL NFR BLD AUTO: 1.4 % (ref 1–4)
ERYTHROCYTE [DISTWIDTH] IN BLOOD BY AUTOMATED COUNT: 13.5 % (ref 11.5–14.5)
EST. AVERAGE GLUCOSE BLD GHB EST-MCNC: 194 MG/DL
GLUCOSE SERPL-MCNC: 100 MG/DL (ref 82–115)
HBA1C MFR BLD HPLC: 8.4 %
HCT VFR BLD AUTO: 46.3 % (ref 40–54)
HGB BLD-MCNC: 14.9 G/DL (ref 13.5–18)
IMM GRANULOCYTES # BLD AUTO: 0.03 K/UL (ref 0–0.04)
IMM GRANULOCYTES NFR BLD: 0.3 % (ref 0–0.4)
LYMPHOCYTES # BLD AUTO: 1.56 K/UL (ref 1–4.8)
LYMPHOCYTES NFR BLD AUTO: 15.7 % (ref 27–41)
MCH RBC QN AUTO: 28.2 PG (ref 27–31)
MCHC RBC AUTO-ENTMCNC: 32.2 G/DL (ref 32–36)
MCV RBC AUTO: 87.5 FL (ref 80–96)
MONOCYTES # BLD AUTO: 0.73 K/UL (ref 0–0.8)
MONOCYTES NFR BLD AUTO: 7.3 % (ref 2–6)
MPC BLD CALC-MCNC: 10.1 FL (ref 9.4–12.4)
NEUTROPHILS # BLD AUTO: 7.42 K/UL (ref 1.8–7.7)
NEUTROPHILS NFR BLD AUTO: 74.5 % (ref 53–65)
NRBC # BLD AUTO: 0 X10E3/UL
NRBC, AUTO (.00): 0 %
PLATELET # BLD AUTO: 221 K/UL (ref 150–400)
POTASSIUM SERPL-SCNC: 4.6 MMOL/L (ref 3.5–5.1)
RBC # BLD AUTO: 5.29 M/UL (ref 4.6–6.2)
SODIUM SERPL-SCNC: 135 MMOL/L (ref 136–145)
WBC # BLD AUTO: 9.96 K/UL (ref 4.5–11)

## 2025-05-07 PROCEDURE — 1159F MED LIST DOCD IN RCRD: CPT | Mod: ,,, | Performed by: FAMILY MEDICINE

## 2025-05-07 PROCEDURE — 3075F SYST BP GE 130 - 139MM HG: CPT | Mod: ,,, | Performed by: FAMILY MEDICINE

## 2025-05-07 PROCEDURE — 3079F DIAST BP 80-89 MM HG: CPT | Mod: ,,, | Performed by: FAMILY MEDICINE

## 2025-05-07 PROCEDURE — 80048 BASIC METABOLIC PNL TOTAL CA: CPT | Mod: ,,, | Performed by: CLINICAL MEDICAL LABORATORY

## 2025-05-07 PROCEDURE — 3288F FALL RISK ASSESSMENT DOCD: CPT | Mod: ,,, | Performed by: FAMILY MEDICINE

## 2025-05-07 PROCEDURE — 99214 OFFICE O/P EST MOD 30 MIN: CPT | Mod: ,,, | Performed by: FAMILY MEDICINE

## 2025-05-07 PROCEDURE — 83036 HEMOGLOBIN GLYCOSYLATED A1C: CPT | Mod: ,,, | Performed by: CLINICAL MEDICAL LABORATORY

## 2025-05-07 PROCEDURE — 2023F DILAT RTA XM W/O RTNOPTHY: CPT | Mod: ,,, | Performed by: FAMILY MEDICINE

## 2025-05-07 PROCEDURE — 85025 COMPLETE CBC W/AUTO DIFF WBC: CPT | Mod: ,,, | Performed by: CLINICAL MEDICAL LABORATORY

## 2025-05-07 PROCEDURE — 1101F PT FALLS ASSESS-DOCD LE1/YR: CPT | Mod: ,,, | Performed by: FAMILY MEDICINE

## 2025-05-07 RX ORDER — INSULIN GLARGINE 300 [IU]/ML
30 INJECTION, SOLUTION SUBCUTANEOUS NIGHTLY
Qty: 3 ML | Refills: 2 | Status: SHIPPED | OUTPATIENT
Start: 2025-05-07

## 2025-05-07 RX ORDER — SEMAGLUTIDE 0.68 MG/ML
INJECTION, SOLUTION SUBCUTANEOUS
Qty: 3 ML | Refills: 3 | Status: SHIPPED | OUTPATIENT
Start: 2025-05-07

## 2025-05-07 RX ORDER — PIOGLITAZONE 15 MG/1
15 TABLET ORAL DAILY
Qty: 90 TABLET | Refills: 0 | Status: SHIPPED | OUTPATIENT
Start: 2025-05-07

## 2025-05-07 NOTE — PROGRESS NOTES
Dontrell Shetty MD   [facility]  [address] [place] [address]     PATIENT NAME: Milton Johnson  : 1945  DATE: 25  MRN: [id number]      Billing Provider: Dontrell Shetty MD  Level of Service: [place] OFFICE/OUTPT VISIT, EST, LEVL IV, 30-39 MIN  Patient PCP Information       Provider PCP Type    Dontrell Shetty MD General            Reason for Visit / Chief Complaint: Hypertension (Check up, labs, refill) and Diabetes         History of Present Illness / Problem Focused Workflow     Milton Johnson presents to the clinic with Hypertension (Check up, labs, refill) and Diabetes     HPI    Review of Systems     Review of Systems   Constitutional:  Negative for activity change, appetite change, fatigue and fever.   HENT:  Negative for nasal congestion, ear pain, hearing loss, sinus pressure/congestion and sore throat.    Respiratory:  Negative for cough, chest tightness and shortness of breath.    Cardiovascular:  Negative for chest pain and palpitations.   Gastrointestinal:  Negative for abdominal pain and fecal incontinence.   Genitourinary:  Negative for bladder incontinence, difficulty urinating and erectile dysfunction.   Musculoskeletal:  Negative for arthralgias.   Integumentary:  Negative for rash.   Neurological:  Negative for dizziness and headaches.        Medical / Social / Family History     Past Medical History:   Diagnosis Date    Diabetes mellitus     Diabetes mellitus, type 2     Glaucoma     High cholesterol     Hypertension        Past Surgical History:   Procedure Laterality Date    CATARACT EXTRACTION      EYE SURGERY      ORIF HIP FRACTURE Right 2024    Dr. Otero       Social History  Milton Johnson  reports that he has never smoked. He has been exposed to tobacco smoke. He has never used smokeless tobacco. He reports current alcohol use of about 1.0 standard drink of alcohol per week. He reports that he does not use  "drugs.    Family History  Milton Johnson  family history is not on file. He was adopted.    Medications and Allergies     Medications  Outpatient Medications Marked as Taking for the 5/7/25 encounter (Office Visit) with Dontrell Shetty MD   Medication Sig Dispense Refill    aspirin (ECOTRIN) 81 MG EC tablet Take 81 mg by mouth 2 (two) times a day. For 30 days      bimatoprost (LUMIGAN) 0.01 % Drop Place 1 drop into both eyes every evening.      carvediloL (COREG) 3.125 MG tablet Take 3.125 mg by mouth 2 (two) times daily with meals.      cholecalciferol, vitamin D3, 125 mcg (5,000 unit) TbDL Take 5,000 Units by mouth once daily.      dorzolamide (TRUSOPT) 2 % ophthalmic solution Place 1 drop into both eyes 2 (two) times a day.      pen needle, diabetic 31 gauge x 1/4" Ndle Inject 2 each into the skin 2 (two) times a day. 100 each 3    rosuvastatin (CRESTOR) 40 MG Tab Take 40 mg by mouth every evening.      [DISCONTINUED] insulin glargine, TOUJEO, (TOUJEO SOLOSTAR U-300 INSULIN) 300 unit/mL (1.5 mL) InPn pen Inject 30 Units into the skin every evening. 3 mL 2    [DISCONTINUED] pioglitazone (ACTOS) 15 MG tablet Take 1 tablet (15 mg total) by mouth once daily. 90 tablet 0    [DISCONTINUED] semaglutide (OZEMPIC) 0.25 mg or 0.5 mg (2 mg/3 mL) pen injector 0.25 mg subcutaneous every 7 days x 1 month, then increase to 0.5 mg subcutaneous every 7 days 3 mL 3       Allergies  Review of patient's allergies indicates:  No Known Allergies    Physical Examination     Vitals:    05/07/25 0759   BP: 130/86   Pulse: 69   Temp: 97.8 °F (36.6 °C)     Physical Exam  Constitutional:       General: He is not in acute distress.     Appearance: He is not ill-appearing.   HENT:      Head: Normocephalic and atraumatic.      Right Ear: Tympanic membrane and ear canal normal.      Left Ear: Tympanic membrane and ear canal normal.      Nose: Nose normal. No congestion or rhinorrhea.   Eyes:      Pupils: Pupils are equal, round, and " reactive to light.   Cardiovascular:      Rate and Rhythm: Normal rate and regular rhythm.      Pulses: Normal pulses.      Heart sounds: No murmur heard.  Pulmonary:      Effort: No respiratory distress.      Breath sounds: No wheezing, rhonchi or rales.   Abdominal:      General: Bowel sounds are normal.      Palpations: Abdomen is soft.      Tenderness: There is no abdominal tenderness.      Hernia: No hernia is present.   Musculoskeletal:      Cervical back: Normal range of motion and neck supple.   Lymphadenopathy:      Cervical: No cervical adenopathy.   Skin:     General: Skin is warm and dry.   Neurological:      Mental Status: He is alert.   Psychiatric:         Behavior: Behavior normal.         Thought Content: Thought content normal.          Assessment and Plan (including Health Maintenance)   :    Plan:         Health Maintenance Due   Topic Date Due    TETANUS VACCINE  Never done    Pneumococcal Vaccines (Age 50+) (1 of 2 - PCV) Never done    Shingles Vaccine (1 of 2) Never done    RSV Vaccine (Age 60+ and Pregnant patients) (1 - 1-dose 75+ series) Never done    COVID-19 Vaccine (5 - 2024-25 season) 09/01/2024       Problem List Items Addressed This Visit          Cardiac/Vascular    Hypertension - Primary    Relevant Orders    Basic Metabolic Panel (Completed)    CBC Auto Differential (Completed)       Endocrine    Type 2 diabetes mellitus with stage 3a chronic kidney disease, with long-term current use of insulin    Relevant Medications    insulin glargine, TOUJEO, (TOUJEO SOLOSTAR U-300 INSULIN) 300 unit/mL (1.5 mL) InPn pen    pioglitazone (ACTOS) 15 MG tablet    semaglutide (OZEMPIC) 0.25 mg or 0.5 mg (2 mg/3 mL) pen injector    Other Relevant Orders    Hemoglobin A1C (Completed)     Other Visit Diagnoses         Hemoglobin A1c between 7.0% and 9.0%        Relevant Orders    Hemoglobin A1C (Completed)          Hypertension, unspecified type  -     Basic Metabolic Panel; Future; Expected date:  05/07/2025  -     CBC Auto Differential; Future; Expected date: 05/07/2025    Type 2 diabetes mellitus with stage 3a chronic kidney disease, with long-term current use of insulin  -     Hemoglobin A1C; Future; Expected date: 05/07/2025  -     semaglutide (OZEMPIC) 0.25 mg or 0.5 mg (2 mg/3 mL) pen injector; 0.25 mg subcutaneous every 7 days x 1 month, then increase to 0.5 mg subcutaneous every 7 days  Dispense: 3 mL; Refill: 3    Hemoglobin A1c between 7.0% and 9.0%  -     Hemoglobin A1C; Future; Expected date: 05/07/2025    Other orders  -     insulin glargine, TOUJEO, (TOUJEO SOLOSTAR U-300 INSULIN) 300 unit/mL (1.5 mL) InPn pen; Inject 30 Units into the skin every evening.  Dispense: 3 mL; Refill: 2  -     pioglitazone (ACTOS) 15 MG tablet; Take 1 tablet (15 mg total) by mouth once daily.  Dispense: 90 tablet; Refill: 0       Health Maintenance Topics with due status: Not Due       Topic Last Completion Date    Diabetes Urine Screening 07/31/2024    Lipid Panel 02/05/2025    Diabetic Eye Exam 04/03/2025    Hemoglobin A1c 05/07/2025       Procedures     Future Appointments   Date Time Provider Department Center   7/15/2025  8:00 [facility] [place]   8/11/2025  8:00 AM Dontrell Shetty MD [facility]        No follow-ups on file.       Signature:  Dontrell Shetty MD  [facility]  [address] [facility] [place] [address]  [phone] Phone  [phone] Fax    Date of encounter: 5/7/25

## 2025-06-25 ENCOUNTER — PATIENT OUTREACH (OUTPATIENT)
Facility: HOSPITAL | Age: 80
End: 2025-06-25
Payer: MEDICARE

## 2025-06-25 NOTE — PROGRESS NOTES
Population Health Chart Review & Patient Outreach Details    **2025 Added appointment note to upcoming appointment for needed lab for [employer] compliance**  Further Action Needed If Patient Returns Outreach:        Health Maintenance Due   Topic Date Due    TETANUS VACCINE  Never done    Pneumococcal Vaccines (Age 50+) (1 of 2 - PCV) Never done    Shingles Vaccine (1 of 2) Never done    RSV Vaccine (Age 60+ and Pregnant patients) (1 - 1-dose 75+ series) Never done    COVID-19 Vaccine ( season) 2024          Updates Requested / Reviewed:     [x]  Care Everywhere    [x]     []  External Sources ([facility], [facility], [facility], etc.)    [] [facility]   [] [facility]   [] Other:    []  Care Team Updated   []  Removed  or Duplicate Orders   []  Immunization Reconciliation Completed / Queried    [] [place]   [] [place]   [] [place]   [] [place]      Health Maintenance Topics Addressed and Outreach Outcomes / Actions Taken:             Breast Cancer Screening []  Mammogram Order Placed    []  Mammogram Screening Scheduled    []  External Records Requested & Care Team Updated if Applicable    []  External Records Uploaded & Care Team Updated if Applicable    []  Pt Declined Scheduling Mammogram    []  Pt Will Schedule with External Provider / Order Routed & Care Team Updated if Applicable              Cervical Cancer Screening []  Pap Smear Scheduled in Primary Care or OBGYN    []  External Records Requested & Care Team Updated if Applicable       []  External Records Uploaded, Care Team Updated, & History Updated if Applicable    []  Patient Declined Scheduling Pap Smear    []  Patient Will Schedule with External Provider & Care Team Updated if Applicable                  Colorectal Cancer Screening []  Colonoscopy Case Request / Referral / Home Test Order Placed    []  External Records Requested & Care Team Updated if Applicable    []  External Records Uploaded, Care Team Updated, & History  Updated if Applicable    []  Patient Declined Completing Colon Cancer Screening    []  Patient Will Schedule with External Provider & Care Team Updated if Applicable    []  Fit Kit Mailed (add the SmartPhrase under additional notes)    []  Reminded Patient to Complete Home Test                Diabetic Eye Exam []  Eye Exam Screening Order Placed    []  Eye Camera Scheduled or Optometry/Ophthalmology Referral Placed    []  External Records Requested & Care Team Updated if Applicable    []  External Records Uploaded, Care Team Updated, & History Updated if Applicable    []  Patient Declined Scheduling Eye Exam    []  Patient Will Schedule with External Provider & Care Team Updated if Applicable             Blood Pressure Control []  Primary Care Follow Up Visit Scheduled     []  Remote Blood Pressure Reading Captured    []  Patient Declined Remote Reading or Scheduling Appt - Escalated to PCP    []  Patient Will Call Back or Send Portal Message with Reading                 HbA1c & Other Labs []  Overdue Lab(s) Ordered    []  Overdue Lab(s) Scheduled    []  External Records Uploaded & Care Team Updated if Applicable    [x]  Primary Care Follow Up Visit Scheduled     []  Reminded Patient to Complete A1c Home Test    []  Patient Declined Scheduling Labs or Will Call Back to Schedule    []  Patient Will Schedule with External Provider / Order Routed, & Care Team Updated if Applicable           Primary Care Appointment []  Primary Care Appt Scheduled    []  Patient Declined Scheduling or Will Call Back to Schedule    []  Pt Established with External Provider, Updated Care Team, & Informed Pt to Notify Payor if Applicable           Medication Adherence /    Statin Use []  Primary Care Appointment Scheduled    []  Patient Reminded to  Prescription    []  Patient Declined, Provider Notified if Needed    []  Sent Provider Message to Review to Evaluate Pt for Statin, Add Exclusion Dx Codes, Document   Exclusion in  Problem List, Change Statin Intensity Level to Moderate or High Intensity if Applicable                Osteoporosis Screening []  Dexa Order Placed    []  Dexa Appointment Scheduled    []  External Records Requested & Care Team Updated    []  External Records Uploaded, Care Team Updated, & History Updated if Applicable    []  Patient Declined Scheduling Dexa or Will Call Back to Schedule    []  Patient Will Schedule with External Provider / Order Routed & Care Team Updated if Applicable       Additional Notes:

## 2025-08-11 ENCOUNTER — OFFICE VISIT (OUTPATIENT)
Dept: FAMILY MEDICINE | Facility: CLINIC | Age: 80
End: 2025-08-11
Payer: MEDICARE

## 2025-08-11 VITALS
HEIGHT: 73 IN | TEMPERATURE: 98 F | OXYGEN SATURATION: 96 % | WEIGHT: 223 LBS | DIASTOLIC BLOOD PRESSURE: 70 MMHG | SYSTOLIC BLOOD PRESSURE: 130 MMHG | BODY MASS INDEX: 29.55 KG/M2 | HEART RATE: 71 BPM

## 2025-08-11 DIAGNOSIS — E11.22 TYPE 2 DIABETES MELLITUS WITH STAGE 3A CHRONIC KIDNEY DISEASE, WITH LONG-TERM CURRENT USE OF INSULIN: ICD-10-CM

## 2025-08-11 DIAGNOSIS — N18.31 TYPE 2 DIABETES MELLITUS WITH STAGE 3A CHRONIC KIDNEY DISEASE, WITH LONG-TERM CURRENT USE OF INSULIN: ICD-10-CM

## 2025-08-11 DIAGNOSIS — I10 HYPERTENSION, UNSPECIFIED TYPE: Primary | ICD-10-CM

## 2025-08-11 DIAGNOSIS — R73.9 HEMOGLOBIN A1C BETWEEN 7.0% AND 9.0%: ICD-10-CM

## 2025-08-11 DIAGNOSIS — E78.5 HYPERLIPIDEMIA, UNSPECIFIED HYPERLIPIDEMIA TYPE: ICD-10-CM

## 2025-08-11 DIAGNOSIS — Z79.4 TYPE 2 DIABETES MELLITUS WITH STAGE 3A CHRONIC KIDNEY DISEASE, WITH LONG-TERM CURRENT USE OF INSULIN: ICD-10-CM

## 2025-08-11 LAB
ALBUMIN SERPL BCP-MCNC: 3.8 G/DL (ref 3.4–4.8)
ALBUMIN/GLOB SERPL: 1.5 {RATIO}
ALP SERPL-CCNC: 66 U/L (ref 40–150)
ALT SERPL W P-5'-P-CCNC: 12 U/L
ANION GAP SERPL CALCULATED.3IONS-SCNC: 11 MMOL/L (ref 7–16)
AST SERPL W P-5'-P-CCNC: 12 U/L (ref 11–45)
BASOPHILS # BLD AUTO: 0.07 K/UL (ref 0–0.2)
BASOPHILS NFR BLD AUTO: 0.5 % (ref 0–1)
BILIRUB SERPL-MCNC: 0.8 MG/DL
BUN SERPL-MCNC: 20 MG/DL (ref 8–26)
BUN/CREAT SERPL: 13 (ref 6–20)
CALCIUM SERPL-MCNC: 9.5 MG/DL (ref 8.8–10)
CHLORIDE SERPL-SCNC: 99 MMOL/L (ref 98–107)
CHOLEST SERPL-MCNC: 81 MG/DL
CHOLEST/HDLC SERPL: 2.1 {RATIO}
CO2 SERPL-SCNC: 24 MMOL/L (ref 23–31)
CREAT SERPL-MCNC: 1.49 MG/DL (ref 0.72–1.25)
CREAT UR-MCNC: 135 MG/DL (ref 23–375)
DIFFERENTIAL METHOD BLD: ABNORMAL
EGFR (NO RACE VARIABLE) (RUSH/TITUS): 48 ML/MIN/1.73M2
EOSINOPHIL # BLD AUTO: 0.05 K/UL (ref 0–0.5)
EOSINOPHIL NFR BLD AUTO: 0.4 % (ref 1–4)
ERYTHROCYTE [DISTWIDTH] IN BLOOD BY AUTOMATED COUNT: 13.2 % (ref 11.5–14.5)
EST. AVERAGE GLUCOSE BLD GHB EST-MCNC: 209 MG/DL
GLOBULIN SER-MCNC: 2.6 G/DL (ref 2–4)
GLUCOSE SERPL-MCNC: 130 MG/DL (ref 82–115)
HBA1C MFR BLD HPLC: 8.9 %
HCT VFR BLD AUTO: 45.8 % (ref 40–54)
HDLC SERPL-MCNC: 38 MG/DL (ref 35–60)
HGB BLD-MCNC: 14.8 G/DL (ref 13.5–18)
IMM GRANULOCYTES # BLD AUTO: 0.04 K/UL (ref 0–0.04)
IMM GRANULOCYTES NFR BLD: 0.3 % (ref 0–0.4)
LDLC SERPL CALC-MCNC: 30 MG/DL
LYMPHOCYTES # BLD AUTO: 1.61 K/UL (ref 1–4.8)
LYMPHOCYTES NFR BLD AUTO: 11.7 % (ref 27–41)
MCH RBC QN AUTO: 28.5 PG (ref 27–31)
MCHC RBC AUTO-ENTMCNC: 32.3 G/DL (ref 32–36)
MCV RBC AUTO: 88.1 FL (ref 80–96)
MICROALBUMIN UR-MCNC: 30.8 MG/DL
MICROALBUMIN/CREAT RATIO PNL UR: 228.1 MG/G (ref 0–30)
MONOCYTES # BLD AUTO: 1.12 K/UL (ref 0–0.8)
MONOCYTES NFR BLD AUTO: 8.2 % (ref 2–6)
MPC BLD CALC-MCNC: 10.4 FL (ref 9.4–12.4)
NEUTROPHILS # BLD AUTO: 10.85 K/UL (ref 1.8–7.7)
NEUTROPHILS NFR BLD AUTO: 78.9 % (ref 53–65)
NONHDLC SERPL-MCNC: 43 MG/DL
NRBC # BLD AUTO: 0 X10E3/UL
NRBC, AUTO (.00): 0 %
PLATELET # BLD AUTO: 224 K/UL (ref 150–400)
POTASSIUM SERPL-SCNC: 4.2 MMOL/L (ref 3.5–5.1)
PROT SERPL-MCNC: 6.4 G/DL (ref 5.8–7.6)
RBC # BLD AUTO: 5.2 M/UL (ref 4.6–6.2)
SODIUM SERPL-SCNC: 130 MMOL/L (ref 136–145)
TRIGL SERPL-MCNC: 64 MG/DL (ref 34–140)
VLDLC SERPL-MCNC: 13 MG/DL
WBC # BLD AUTO: 13.74 K/UL (ref 4.5–11)

## 2025-08-11 PROCEDURE — 82570 ASSAY OF URINE CREATININE: CPT | Mod: ,,, | Performed by: CLINICAL MEDICAL LABORATORY

## 2025-08-11 PROCEDURE — 83036 HEMOGLOBIN GLYCOSYLATED A1C: CPT | Mod: ,,, | Performed by: CLINICAL MEDICAL LABORATORY

## 2025-08-11 PROCEDURE — 82043 UR ALBUMIN QUANTITATIVE: CPT | Mod: ,,, | Performed by: CLINICAL MEDICAL LABORATORY

## 2025-08-11 PROCEDURE — 85025 COMPLETE CBC W/AUTO DIFF WBC: CPT | Mod: ,,, | Performed by: CLINICAL MEDICAL LABORATORY

## 2025-08-11 PROCEDURE — 80053 COMPREHEN METABOLIC PANEL: CPT | Mod: ,,, | Performed by: CLINICAL MEDICAL LABORATORY

## 2025-08-11 PROCEDURE — 80061 LIPID PANEL: CPT | Mod: ,,, | Performed by: CLINICAL MEDICAL LABORATORY

## 2025-08-11 RX ORDER — AMLODIPINE BESYLATE 5 MG/1
5 TABLET ORAL DAILY
COMMUNITY

## 2025-08-11 RX ORDER — INSULIN GLARGINE 300 [IU]/ML
30 INJECTION, SOLUTION SUBCUTANEOUS NIGHTLY
Qty: 3 ML | Refills: 2 | Status: SHIPPED | OUTPATIENT
Start: 2025-08-11

## 2025-08-11 RX ORDER — METFORMIN HYDROCHLORIDE 500 MG/1
500 TABLET ORAL 2 TIMES DAILY
COMMUNITY
Start: 2025-06-21 | End: 2025-08-11 | Stop reason: SDUPTHER

## 2025-08-11 RX ORDER — METFORMIN HYDROCHLORIDE 500 MG/1
500 TABLET ORAL 2 TIMES DAILY
Qty: 180 TABLET | Refills: 0 | Status: SHIPPED | OUTPATIENT
Start: 2025-08-11

## 2025-08-11 RX ORDER — TIRZEPATIDE 2.5 MG/.5ML
2.5 INJECTION, SOLUTION SUBCUTANEOUS
Qty: 2 ML | Refills: 2 | Status: SHIPPED | OUTPATIENT
Start: 2025-08-11 | End: 2025-11-09

## 2025-08-11 RX ORDER — PIOGLITAZONE 15 MG/1
15 TABLET ORAL DAILY
Qty: 90 TABLET | Refills: 0 | Status: SHIPPED | OUTPATIENT
Start: 2025-08-11

## 2025-08-25 DIAGNOSIS — N18.31 TYPE 2 DIABETES MELLITUS WITH STAGE 3A CHRONIC KIDNEY DISEASE, WITH LONG-TERM CURRENT USE OF INSULIN: Primary | ICD-10-CM

## 2025-08-25 DIAGNOSIS — E11.22 TYPE 2 DIABETES MELLITUS WITH STAGE 3A CHRONIC KIDNEY DISEASE, WITH LONG-TERM CURRENT USE OF INSULIN: Primary | ICD-10-CM

## 2025-08-25 DIAGNOSIS — Z79.4 TYPE 2 DIABETES MELLITUS WITH STAGE 3A CHRONIC KIDNEY DISEASE, WITH LONG-TERM CURRENT USE OF INSULIN: Primary | ICD-10-CM
